# Patient Record
Sex: MALE | Race: WHITE | NOT HISPANIC OR LATINO | Employment: OTHER | URBAN - METROPOLITAN AREA
[De-identification: names, ages, dates, MRNs, and addresses within clinical notes are randomized per-mention and may not be internally consistent; named-entity substitution may affect disease eponyms.]

---

## 2023-02-25 LAB — SARS-COV-2 RESULT: NOT DETECTED

## 2023-03-09 ENCOUNTER — DOCUMENTATION (OUTPATIENT)
Dept: PRIMARY CARE | Facility: CLINIC | Age: 88
End: 2023-03-09
Payer: MEDICARE

## 2023-03-09 ENCOUNTER — TELEPHONE (OUTPATIENT)
Dept: PRIMARY CARE | Facility: CLINIC | Age: 88
End: 2023-03-09
Payer: MEDICARE

## 2023-03-09 DIAGNOSIS — I50.22 CHRONIC SYSTOLIC HEART FAILURE (MULTI): ICD-10-CM

## 2023-03-09 DIAGNOSIS — I48.91 ATRIAL FIBRILLATION, UNSPECIFIED TYPE (MULTI): Primary | ICD-10-CM

## 2023-03-09 NOTE — TELEPHONE ENCOUNTER
"----- Message from Rose Lee PharmD sent at 3/9/2023  4:29 PM EST -----  Regarding: Post Discharge Clinical Pharmacy Program Referral  Vickie Barton,       Your patient, Hugo Weaver, has been identified in the hospital as someone who would benefit from participating in the [facility] program, sponsored by Dr. Lund. This program aims to reduce future hospitalizations associated with chronic conditions through medication optimization and monitoring post-discharge, as well as in between their regularly scheduled appointments with you.     If agreeable, please submit a referral using the \"Referral to Clinical Pharmacy\" order and select the \"Platinum\" option in the order details for his Diabetes and/or CHF and the patient will be scheduled for our first telehealth session within 30 days of discharge.     Rose Lee, PharmD, [facility]  PGY1 Pharmacy Resident        "

## 2023-03-10 ENCOUNTER — PATIENT OUTREACH (OUTPATIENT)
Dept: CARE COORDINATION | Facility: CLINIC | Age: 88
End: 2023-03-10
Payer: MEDICARE

## 2023-03-10 DIAGNOSIS — R00.1 SYMPTOMATIC BRADYCARDIA: ICD-10-CM

## 2023-03-10 NOTE — PROGRESS NOTES
Discharge Facility:  [place]   Discharge Diagnosis:    symptomatic bradycardia   Admission Date:   3/6/23   Discharge Date:   3/8/23     PCP appt:   3/16/23       Engagement  Call Start Time: 1359 (3/10/2023  2:03 PM)    Medications  Medications reviewed with patient/caregiver?: Yes (3/10/2023  2:03 PM)  Is the patient having any side effects they believe may be caused by any medication additions or changes?: No (3/10/2023  2:03 PM)  Does the patient have all medications ordered at discharge?: Yes (3/10/2023  2:03 PM)  Prescription Comments: hold metoprolol (3/10/2023  2:03 PM)  Is the patient taking all medications as directed (includes completed medication regime)?: Yes (3/10/2023  2:03 PM)  Care Management Interventions: Nurse provided patient education (3/10/2023  2:03 PM)    Appointments  Does the patient have a primary care provider?: Yes (3/10/2023  2:03 PM)  Care Management Interventions: Verified appointment date/time/provider (3/10/2023  2:03 PM)  Has the patient kept scheduled appointments due by today?: Yes (3/10/2023  2:03 PM)    Self Management  Has home health visited the patient within 72 hours of discharge?: Not applicable (3/10/2023  2:03 PM)  DME Interventions: -- (pt has cane and rollator) (3/10/2023  2:03 PM)    Patient Teaching  Does the patient have access to their discharge instructions?: Yes (3/10/2023  2:03 PM)  Care Management Interventions: Reviewed instructions with patient (3/10/2023  2:03 PM)  What is the patient's perception of their health status since discharge?: Improving (3/10/2023  2:03 PM)  Is the patient/caregiver able to teach back the hierarchy of who to call/visit for symptoms/problems? PCP, Specialist, Home Health nurse, Urgent Care, ED, 911: Yes (3/10/2023  2:03 PM)    Wrap Up  Call End Time: 1404 (3/10/2023  2:03 PM)

## 2023-03-12 PROBLEM — R07.9 CHEST PAIN: Status: ACTIVE | Noted: 2023-03-12

## 2023-03-12 PROBLEM — J06.9 UPPER RESPIRATORY TRACT INFECTION: Status: ACTIVE | Noted: 2023-03-12

## 2023-03-12 PROBLEM — N40.0 BENIGN PROSTATE HYPERPLASIA: Status: ACTIVE | Noted: 2023-03-12

## 2023-03-12 PROBLEM — I49.5 SICK SINUS SYNDROME (MULTI): Status: ACTIVE | Noted: 2023-03-12

## 2023-03-12 PROBLEM — S46.819A TRAPEZIUS MUSCLE STRAIN: Status: ACTIVE | Noted: 2023-03-12

## 2023-03-12 PROBLEM — R10.31 RIGHT LOWER QUADRANT ABDOMINAL PAIN: Status: RESOLVED | Noted: 2023-03-12 | Resolved: 2023-03-12

## 2023-03-12 PROBLEM — R60.9 EDEMA: Status: ACTIVE | Noted: 2023-03-12

## 2023-03-12 PROBLEM — M54.2 CERVICALGIA: Status: ACTIVE | Noted: 2023-03-12

## 2023-03-12 PROBLEM — R53.1 WEAKNESS: Status: RESOLVED | Noted: 2023-03-12 | Resolved: 2023-03-12

## 2023-03-12 PROBLEM — F41.9 ANXIETY: Status: ACTIVE | Noted: 2023-03-12

## 2023-03-12 PROBLEM — I44.7 LEFT BUNDLE-BRANCH BLOCK: Status: ACTIVE | Noted: 2023-03-12

## 2023-03-12 PROBLEM — I50.9 CHF (CONGESTIVE HEART FAILURE) (MULTI): Status: ACTIVE | Noted: 2023-03-12

## 2023-03-12 PROBLEM — G62.9 PERIPHERAL NEUROPATHY: Status: ACTIVE | Noted: 2023-03-12

## 2023-03-12 PROBLEM — H34.9: Status: ACTIVE | Noted: 2023-03-12

## 2023-03-12 PROBLEM — E55.9 VITAMIN D DEFICIENCY: Status: ACTIVE | Noted: 2023-03-12

## 2023-03-12 PROBLEM — H90.3 BILATERAL SENSORINEURAL HEARING LOSS: Status: ACTIVE | Noted: 2023-03-12

## 2023-03-12 PROBLEM — I20.89 ATYPICAL ANGINA (CMS-HCC): Status: ACTIVE | Noted: 2023-03-12

## 2023-03-12 PROBLEM — J01.80 OTHER ACUTE SINUSITIS: Status: ACTIVE | Noted: 2023-03-12

## 2023-03-12 PROBLEM — B00.1 COLD SORE: Status: ACTIVE | Noted: 2023-03-12

## 2023-03-12 PROBLEM — L10.9 PEMPHIGUS (MULTI): Status: ACTIVE | Noted: 2023-03-12

## 2023-03-12 PROBLEM — J01.80 OTHER ACUTE SINUSITIS: Status: RESOLVED | Noted: 2023-03-12 | Resolved: 2023-03-12

## 2023-03-12 PROBLEM — K64.9 HEMORRHOID: Status: ACTIVE | Noted: 2023-03-12

## 2023-03-12 PROBLEM — Z20.822 SUSPECTED COVID-19 VIRUS INFECTION: Status: ACTIVE | Noted: 2023-03-12

## 2023-03-12 PROBLEM — R63.4 UNINTENTIONAL WEIGHT LOSS: Status: ACTIVE | Noted: 2023-03-12

## 2023-03-12 PROBLEM — R53.1 WEAKNESS: Status: ACTIVE | Noted: 2023-03-12

## 2023-03-12 PROBLEM — J32.0 CHRONIC SINUSITIS OF BOTH MAXILLARY SINUSES: Status: ACTIVE | Noted: 2023-03-12

## 2023-03-12 PROBLEM — R19.8 ALTERNATING CONSTIPATION AND DIARRHEA: Status: ACTIVE | Noted: 2023-03-12

## 2023-03-12 PROBLEM — R63.4 UNINTENTIONAL WEIGHT LOSS: Status: RESOLVED | Noted: 2023-03-12 | Resolved: 2023-03-12

## 2023-03-12 PROBLEM — N18.30 CHRONIC KIDNEY DISEASE, STAGE III (MODERATE) (MULTI): Status: ACTIVE | Noted: 2023-03-12

## 2023-03-12 PROBLEM — R53.83 FATIGUE: Status: ACTIVE | Noted: 2023-03-12

## 2023-03-12 PROBLEM — I48.91 ATRIAL FIBRILLATION (MULTI): Status: ACTIVE | Noted: 2023-03-12

## 2023-03-12 PROBLEM — I51.9 LEFT VENTRICULAR SYSTOLIC DYSFUNCTION WITHOUT HEART FAILURE: Status: ACTIVE | Noted: 2023-03-12

## 2023-03-12 PROBLEM — I25.10 ARTERIOSCLEROTIC CORONARY ARTERY DISEASE: Status: ACTIVE | Noted: 2023-03-12

## 2023-03-12 PROBLEM — E78.5 HLD (HYPERLIPIDEMIA): Status: ACTIVE | Noted: 2023-03-12

## 2023-03-12 PROBLEM — M85.80 OSTEOPENIA: Status: ACTIVE | Noted: 2023-03-12

## 2023-03-12 PROBLEM — H61.23 BILATERAL IMPACTED CERUMEN: Status: ACTIVE | Noted: 2023-03-12

## 2023-03-12 PROBLEM — E78.2 COMBINED HYPERLIPIDEMIA: Status: ACTIVE | Noted: 2023-03-12

## 2023-03-12 PROBLEM — E83.42 HYPOMAGNESEMIA: Status: ACTIVE | Noted: 2023-03-12

## 2023-03-12 PROBLEM — K52.9 CHRONIC DIARRHEA: Status: ACTIVE | Noted: 2023-03-12

## 2023-03-12 PROBLEM — I34.0 MITRAL INSUFFICIENCY: Status: ACTIVE | Noted: 2023-03-12

## 2023-03-12 PROBLEM — R94.31 ABNORMAL EKG: Status: ACTIVE | Noted: 2023-03-12

## 2023-03-12 PROBLEM — I10 HYPERTENSION: Status: ACTIVE | Noted: 2023-03-12

## 2023-03-12 PROBLEM — R10.31 RIGHT LOWER QUADRANT ABDOMINAL PAIN: Status: ACTIVE | Noted: 2023-03-12

## 2023-03-12 PROBLEM — E34.9 TESTOSTERONE DEFICIENCY: Status: ACTIVE | Noted: 2023-03-12

## 2023-03-12 PROBLEM — R07.89 RIGHT-SIDED CHEST WALL PAIN: Status: RESOLVED | Noted: 2023-03-12 | Resolved: 2023-03-12

## 2023-03-12 PROBLEM — R73.9 HYPERGLYCEMIA: Status: ACTIVE | Noted: 2023-03-12

## 2023-03-12 PROBLEM — R07.89 RIGHT-SIDED CHEST WALL PAIN: Status: ACTIVE | Noted: 2023-03-12

## 2023-03-12 PROBLEM — I82.451: Status: ACTIVE | Noted: 2023-03-12

## 2023-03-12 PROBLEM — J30.9 ALLERGIC RHINOSINUSITIS: Status: ACTIVE | Noted: 2023-03-12

## 2023-03-12 PROBLEM — J06.9 UPPER RESPIRATORY TRACT INFECTION: Status: RESOLVED | Noted: 2023-03-12 | Resolved: 2023-03-12

## 2023-03-12 PROBLEM — R53.83 FATIGUE: Status: RESOLVED | Noted: 2023-03-12 | Resolved: 2023-03-12

## 2023-03-12 RX ORDER — AZATHIOPRINE 50 MG/1
1 TABLET ORAL 2 TIMES DAILY
COMMUNITY
End: 2023-10-12 | Stop reason: ALTCHOICE

## 2023-03-12 RX ORDER — AZATHIOPRINE 50 MG/1
50 TABLET ORAL DAILY
COMMUNITY
End: 2023-03-12 | Stop reason: ENTERED-IN-ERROR

## 2023-03-12 RX ORDER — BUMETANIDE 2 MG/1
1 TABLET ORAL
Status: ON HOLD | COMMUNITY
End: 2023-11-02 | Stop reason: ENTERED-IN-ERROR

## 2023-03-12 RX ORDER — CHOLECALCIFEROL (VITAMIN D3) 50 MCG
1 TABLET ORAL DAILY
COMMUNITY
End: 2023-11-13

## 2023-03-12 RX ORDER — PRAVASTATIN SODIUM 20 MG/1
1 TABLET ORAL NIGHTLY
COMMUNITY
Start: 2017-02-16 | End: 2023-11-24 | Stop reason: SDUPTHER

## 2023-03-12 RX ORDER — LUTEIN 6 MG
1 TABLET ORAL DAILY
Status: ON HOLD | COMMUNITY
End: 2023-11-02 | Stop reason: ENTERED-IN-ERROR

## 2023-03-12 RX ORDER — SODIUM FLUORIDE 5 MG/G
PASTE, DENTIFRICE DENTAL DAILY
Status: ON HOLD | COMMUNITY
Start: 2022-05-17 | End: 2023-11-02 | Stop reason: ENTERED-IN-ERROR

## 2023-03-12 RX ORDER — FLUTICASONE PROPIONATE 50 MCG
2 SPRAY, SUSPENSION (ML) NASAL DAILY
COMMUNITY
End: 2023-10-12 | Stop reason: ALTCHOICE

## 2023-03-12 RX ORDER — IBUPROFEN 100 MG/5ML
1 SUSPENSION, ORAL (FINAL DOSE FORM) ORAL DAILY
COMMUNITY

## 2023-03-12 RX ORDER — METOPROLOL SUCCINATE 25 MG/1
1 TABLET, EXTENDED RELEASE ORAL DAILY
COMMUNITY
End: 2023-03-16

## 2023-03-12 RX ORDER — SPIRONOLACTONE 25 MG/1
1 TABLET ORAL DAILY
COMMUNITY
Start: 2015-06-04 | End: 2023-10-30 | Stop reason: SDUPTHER

## 2023-03-12 RX ORDER — LANOLIN ALCOHOL/MO/W.PET/CERES
1 CREAM (GRAM) TOPICAL DAILY
Status: ON HOLD | COMMUNITY
Start: 2021-09-09 | End: 2023-11-02 | Stop reason: ENTERED-IN-ERROR

## 2023-03-16 ENCOUNTER — OFFICE VISIT (OUTPATIENT)
Dept: PRIMARY CARE | Facility: CLINIC | Age: 88
End: 2023-03-16
Payer: MEDICARE

## 2023-03-16 VITALS
BODY MASS INDEX: 24.95 KG/M2 | DIASTOLIC BLOOD PRESSURE: 75 MMHG | WEIGHT: 184 LBS | SYSTOLIC BLOOD PRESSURE: 156 MMHG | HEART RATE: 68 BPM | OXYGEN SATURATION: 97 % | RESPIRATION RATE: 16 BRPM

## 2023-03-16 DIAGNOSIS — I48.91 ATRIAL FIBRILLATION, UNSPECIFIED TYPE (MULTI): ICD-10-CM

## 2023-03-16 DIAGNOSIS — E11.9 TYPE 2 DIABETES MELLITUS WITHOUT COMPLICATION, WITHOUT LONG-TERM CURRENT USE OF INSULIN (MULTI): ICD-10-CM

## 2023-03-16 DIAGNOSIS — I82.441 ACUTE EMBOLISM AND THROMBOSIS OF RIGHT TIBIAL VEIN (MULTI): ICD-10-CM

## 2023-03-16 DIAGNOSIS — I49.5 SICK SINUS SYNDROME (MULTI): ICD-10-CM

## 2023-03-16 DIAGNOSIS — I50.22 CHRONIC SYSTOLIC CONGESTIVE HEART FAILURE (MULTI): ICD-10-CM

## 2023-03-16 DIAGNOSIS — R00.1 SYMPTOMATIC BRADYCARDIA: Primary | ICD-10-CM

## 2023-03-16 DIAGNOSIS — N18.32 STAGE 3B CHRONIC KIDNEY DISEASE (MULTI): ICD-10-CM

## 2023-03-16 PROBLEM — R73.9 HYPERGLYCEMIA: Status: RESOLVED | Noted: 2023-03-12 | Resolved: 2023-03-16

## 2023-03-16 PROBLEM — R07.9 CHEST PAIN: Status: RESOLVED | Noted: 2023-03-12 | Resolved: 2023-03-16

## 2023-03-16 PROBLEM — R60.9 EDEMA: Status: RESOLVED | Noted: 2023-03-12 | Resolved: 2023-03-16

## 2023-03-16 PROBLEM — R94.31 ABNORMAL EKG: Status: RESOLVED | Noted: 2023-03-12 | Resolved: 2023-03-16

## 2023-03-16 PROBLEM — I20.89 ATYPICAL ANGINA (CMS-HCC): Status: RESOLVED | Noted: 2023-03-12 | Resolved: 2023-03-16

## 2023-03-16 PROBLEM — H61.23 BILATERAL IMPACTED CERUMEN: Status: RESOLVED | Noted: 2023-03-12 | Resolved: 2023-03-16

## 2023-03-16 PROBLEM — I82.451: Status: RESOLVED | Noted: 2023-03-12 | Resolved: 2023-03-16

## 2023-03-16 PROBLEM — I51.9 LEFT VENTRICULAR SYSTOLIC DYSFUNCTION WITHOUT HEART FAILURE: Status: RESOLVED | Noted: 2023-03-12 | Resolved: 2023-03-16

## 2023-03-16 PROBLEM — Z20.822 SUSPECTED COVID-19 VIRUS INFECTION: Status: RESOLVED | Noted: 2023-03-12 | Resolved: 2023-03-16

## 2023-03-16 PROCEDURE — 99214 OFFICE O/P EST MOD 30 MIN: CPT | Performed by: FAMILY MEDICINE

## 2023-03-16 PROCEDURE — 1036F TOBACCO NON-USER: CPT | Performed by: FAMILY MEDICINE

## 2023-03-16 PROCEDURE — G0009 ADMIN PNEUMOCOCCAL VACCINE: HCPCS | Performed by: FAMILY MEDICINE

## 2023-03-16 PROCEDURE — 90677 PCV20 VACCINE IM: CPT | Performed by: FAMILY MEDICINE

## 2023-03-16 PROCEDURE — 3078F DIAST BP <80 MM HG: CPT | Performed by: FAMILY MEDICINE

## 2023-03-16 PROCEDURE — 1159F MED LIST DOCD IN RCRD: CPT | Performed by: FAMILY MEDICINE

## 2023-03-16 PROCEDURE — 3077F SYST BP >= 140 MM HG: CPT | Performed by: FAMILY MEDICINE

## 2023-03-16 RX ORDER — ENALAPRIL MALEATE 10 MG/1
5 TABLET ORAL
COMMUNITY
End: 2023-10-12 | Stop reason: ALTCHOICE

## 2023-03-16 RX ORDER — TAMSULOSIN HYDROCHLORIDE 0.4 MG/1
0.4 CAPSULE ORAL
COMMUNITY
End: 2023-10-12 | Stop reason: ALTCHOICE

## 2023-03-16 RX ORDER — PREDNISONE 1 MG/1
1 TABLET ORAL DAILY
COMMUNITY
Start: 2023-02-24 | End: 2023-11-28 | Stop reason: SDUPTHER

## 2023-03-16 ASSESSMENT — ENCOUNTER SYMPTOMS
SHORTNESS OF BREATH: 0
FEVER: 0
COUGH: 0
DIZZINESS: 1

## 2023-03-16 NOTE — PROGRESS NOTES
Subjective   Patient ID: Hugo Weaver is a 89 y.o. male who presents for Hospital Follow-up (3/6/23-3/8/23 for bradycardia /).    HPI     He is here today for follow-up hospitalization.  He was hospitalized 3/6 through 3/8/2023 with symptomatic bradycardia    Past medical history is as follows:  He follows with cardiology for CAD status post CABG in 2003, history of left bundle branch block, atrial fibrillation, as well as chronic systolic heart failure.  He currently takes spironolactone, enalapril, bumetanide as needed, and Xarelto  He has a history of right lower extremity DVT that was diagnosed in late December in the ED on ultrasound.  He is currently taking Xarelto for this  He follows with a specialist for pemphigus and takes daily Imuran and prednisone 3 mg daily  Hyperlipidemia: Takes pravastatin 20 mg nightly  He has a history of diabetes mellitus which had previously been diagnosed when he was taking prednisone.  His last A1c done actually 2 months ago was 7.4%.    He had presented to the ED with symptoms including lightheadedness, nausea and abdominal pain.  This had started 1 day after starting metoprolol  CT scan of his head did not show any acute findings  CT abdomen/pelvis did not show any definitive acute process, but did show a radiodensity in the lumen of the aortic aneurysm and the mural thrombus which may possibly be endoleak or calcification.  He did see vascular surgery for this finding who had recommended no acute surgical intervention at this time.  He saw cardiology and metoprolol was stopped.  No other medication changes were made    Since discharge from hospital he is feeling well.  He has not had any further episodes of dizziness.  He will get an occasional cough due to postnasal drainage.  Abdominal pain and nausea resolved.  No significant chest pain or difficulty breathing        Review of Systems   Constitutional:  Negative for fever.   Respiratory:  Negative for cough and  shortness of breath.    Cardiovascular:  Positive for leg swelling. Negative for chest pain.   Neurological:  Positive for dizziness.       Objective   /75   Pulse 68   Resp 16   Wt 83.5 kg (184 lb)   SpO2 97%   BMI 24.95 kg/m²     Physical Exam  Vitals reviewed.   Constitutional:       General: He is not in acute distress.     Appearance: Normal appearance. He is well-developed.   HENT:      Head: Normocephalic.   Eyes:      Conjunctiva/sclera: Conjunctivae normal.   Cardiovascular:      Rate and Rhythm: Normal rate.      Heart sounds: Normal heart sounds.      Comments: Irregularly irregular  Pulmonary:      Effort: Pulmonary effort is normal.      Breath sounds: Normal breath sounds.   Musculoskeletal:      Right lower leg: Edema present.      Left lower leg: Edema present.      Comments: Trace bilateral leg edema   Skin:     Findings: No rash.   Neurological:      Mental Status: He is alert.   Psychiatric:         Mood and Affect: Mood normal.         Behavior: Behavior normal.         Assessment/Plan   Problem List Items Addressed This Visit          Medium    Atrial fibrillation (CMS/[facility])    CHF (congestive heart failure) (CMS/[facility])    Chronic kidney disease, stage III (moderate) (CMS/[facility])    Sick sinus syndrome (CMS/[facility])    Thrombosis of right peroneal vein (CMS/[facility])     Other Visit Diagnoses       Symptomatic bradycardia    -  Primary    Type 2 diabetes mellitus without complication, without long-term current use of insulin (CMS/[facility])        Relevant Orders    Hemoglobin A1C    Acute embolism and thrombosis of right tibial vein (CMS/[facility])            With recent hospitalization 3/6 through 3/8/2023 for symptomatic bradycardia secondary to metoprolol.  Metoprolol has since been stopped and his dizziness has resolved.  He is going to be following up with cardiology later this month for recheck.  Recommended that he contact us or them if any dizziness recurs.  Continue other medications including  enalapril, spironolactone, and Xarelto and bumetanide as needed  He was cleared for discharge by vascular surgery, and is going to be following up with them as well  Diabetes mellitus: His last A1c was 7.4%.  We will plan on checking this again in the next 2 to 3 months  Total time spent today was approximately 35 minutes.  This included time spent reviewing the chart prior to appointment, time spent with patient, time spent documenting once the appointment was complete

## 2023-03-27 ENCOUNTER — PATIENT OUTREACH (OUTPATIENT)
Dept: PRIMARY CARE | Facility: CLINIC | Age: 88
End: 2023-03-27
Payer: MEDICARE

## 2023-03-27 NOTE — PROGRESS NOTES
Call regarding apt with PCP on 3/16 after hospitalization.  At time of outreach call the patient feels as if their condition has(improved to baseline) since last visit.  Patient verbalizes understanding of new orders including labs, therapy, HHC, and DME.   Patient verbalizes understanding of medications including changes made during PCP  Patient verbalized understanding plan of care   Any further questions or concerns at this time for PCP? /No

## 2023-05-10 ENCOUNTER — LAB (OUTPATIENT)
Dept: LAB | Facility: LAB | Age: 88
End: 2023-05-10
Payer: MEDICARE

## 2023-05-10 DIAGNOSIS — E11.9 TYPE 2 DIABETES MELLITUS WITHOUT COMPLICATION, WITHOUT LONG-TERM CURRENT USE OF INSULIN (MULTI): ICD-10-CM

## 2023-05-10 LAB
ALANINE AMINOTRANSFERASE (SGPT) (U/L) IN SER/PLAS: 11 U/L (ref 10–52)
ALBUMIN (G/DL) IN SER/PLAS: 4.2 G/DL (ref 3.4–5)
ALKALINE PHOSPHATASE (U/L) IN SER/PLAS: 33 U/L (ref 33–136)
ANION GAP IN SER/PLAS: 13 MMOL/L (ref 10–20)
ASPARTATE AMINOTRANSFERASE (SGOT) (U/L) IN SER/PLAS: 17 U/L (ref 9–39)
BASOPHILS (10*3/UL) IN BLOOD BY AUTOMATED COUNT: 0.08 X10E9/L (ref 0–0.1)
BASOPHILS/100 LEUKOCYTES IN BLOOD BY AUTOMATED COUNT: 1.3 % (ref 0–2)
BILIRUBIN TOTAL (MG/DL) IN SER/PLAS: 0.9 MG/DL (ref 0–1.2)
CALCIUM (MG/DL) IN SER/PLAS: 9.5 MG/DL (ref 8.6–10.3)
CARBON DIOXIDE, TOTAL (MMOL/L) IN SER/PLAS: 25 MMOL/L (ref 21–32)
CHLORIDE (MMOL/L) IN SER/PLAS: 106 MMOL/L (ref 98–107)
CREATININE (MG/DL) IN SER/PLAS: 1.45 MG/DL (ref 0.5–1.3)
EOSINOPHILS (10*3/UL) IN BLOOD BY AUTOMATED COUNT: 0.38 X10E9/L (ref 0–0.4)
EOSINOPHILS/100 LEUKOCYTES IN BLOOD BY AUTOMATED COUNT: 6 % (ref 0–6)
ERYTHROCYTE DISTRIBUTION WIDTH (RATIO) BY AUTOMATED COUNT: 14.1 % (ref 11.5–14.5)
ERYTHROCYTE MEAN CORPUSCULAR HEMOGLOBIN CONCENTRATION (G/DL) BY AUTOMATED: 31.6 G/DL (ref 32–36)
ERYTHROCYTE MEAN CORPUSCULAR VOLUME (FL) BY AUTOMATED COUNT: 100 FL (ref 80–100)
ERYTHROCYTES (10*6/UL) IN BLOOD BY AUTOMATED COUNT: 4.2 X10E12/L (ref 4.5–5.9)
ESTIMATED AVERAGE GLUCOSE FOR HBA1C: 154 MG/DL
GFR MALE: 45 ML/MIN/1.73M2
GLUCOSE (MG/DL) IN SER/PLAS: 135 MG/DL (ref 74–99)
HEMATOCRIT (%) IN BLOOD BY AUTOMATED COUNT: 42.1 % (ref 41–52)
HEMOGLOBIN (G/DL) IN BLOOD: 13.3 G/DL (ref 13.5–17.5)
HEMOGLOBIN A1C/HEMOGLOBIN TOTAL IN BLOOD: 7 %
IMMATURE GRANULOCYTES/100 LEUKOCYTES IN BLOOD BY AUTOMATED COUNT: 3.2 % (ref 0–0.9)
LEUKOCYTES (10*3/UL) IN BLOOD BY AUTOMATED COUNT: 6.3 X10E9/L (ref 4.4–11.3)
LYMPHOCYTES (10*3/UL) IN BLOOD BY AUTOMATED COUNT: 1.2 X10E9/L (ref 0.8–3)
LYMPHOCYTES/100 LEUKOCYTES IN BLOOD BY AUTOMATED COUNT: 19 % (ref 13–44)
MONOCYTES (10*3/UL) IN BLOOD BY AUTOMATED COUNT: 0.56 X10E9/L (ref 0.05–0.8)
MONOCYTES/100 LEUKOCYTES IN BLOOD BY AUTOMATED COUNT: 8.9 % (ref 2–10)
NEUTROPHILS (10*3/UL) IN BLOOD BY AUTOMATED COUNT: 3.89 X10E9/L (ref 1.6–5.5)
NEUTROPHILS/100 LEUKOCYTES IN BLOOD BY AUTOMATED COUNT: 61.6 % (ref 40–80)
PLATELETS (10*3/UL) IN BLOOD AUTOMATED COUNT: 196 X10E9/L (ref 150–450)
POTASSIUM (MMOL/L) IN SER/PLAS: 4.3 MMOL/L (ref 3.5–5.3)
PROTEIN TOTAL: 6.6 G/DL (ref 6.4–8.2)
SODIUM (MMOL/L) IN SER/PLAS: 140 MMOL/L (ref 136–145)
UREA NITROGEN (MG/DL) IN SER/PLAS: 34 MG/DL (ref 6–23)

## 2023-05-10 PROCEDURE — 83036 HEMOGLOBIN GLYCOSYLATED A1C: CPT

## 2023-05-10 PROCEDURE — 36415 COLL VENOUS BLD VENIPUNCTURE: CPT

## 2023-07-11 LAB
ALANINE AMINOTRANSFERASE (SGPT) (U/L) IN SER/PLAS: 11 U/L (ref 10–52)
ALBUMIN (G/DL) IN SER/PLAS: 4.1 G/DL (ref 3.4–5)
ALKALINE PHOSPHATASE (U/L) IN SER/PLAS: 31 U/L (ref 33–136)
ANION GAP IN SER/PLAS: 14 MMOL/L (ref 10–20)
ASPARTATE AMINOTRANSFERASE (SGOT) (U/L) IN SER/PLAS: 18 U/L (ref 9–39)
BASOPHILS (10*3/UL) IN BLOOD BY AUTOMATED COUNT: 0.07 X10E9/L (ref 0–0.1)
BASOPHILS/100 LEUKOCYTES IN BLOOD BY AUTOMATED COUNT: 1 % (ref 0–2)
BILIRUBIN TOTAL (MG/DL) IN SER/PLAS: 0.6 MG/DL (ref 0–1.2)
CALCIUM (MG/DL) IN SER/PLAS: 9.6 MG/DL (ref 8.6–10.3)
CARBON DIOXIDE, TOTAL (MMOL/L) IN SER/PLAS: 23 MMOL/L (ref 21–32)
CHLORIDE (MMOL/L) IN SER/PLAS: 104 MMOL/L (ref 98–107)
CREATININE (MG/DL) IN SER/PLAS: 1.41 MG/DL (ref 0.5–1.3)
EOSINOPHILS (10*3/UL) IN BLOOD BY AUTOMATED COUNT: 0.23 X10E9/L (ref 0–0.4)
EOSINOPHILS/100 LEUKOCYTES IN BLOOD BY AUTOMATED COUNT: 3.1 % (ref 0–6)
ERYTHROCYTE DISTRIBUTION WIDTH (RATIO) BY AUTOMATED COUNT: 14.1 % (ref 11.5–14.5)
ERYTHROCYTE MEAN CORPUSCULAR HEMOGLOBIN CONCENTRATION (G/DL) BY AUTOMATED: 32.4 G/DL (ref 32–36)
ERYTHROCYTE MEAN CORPUSCULAR VOLUME (FL) BY AUTOMATED COUNT: 97 FL (ref 80–100)
ERYTHROCYTES (10*6/UL) IN BLOOD BY AUTOMATED COUNT: 4.24 X10E12/L (ref 4.5–5.9)
GFR MALE: 47 ML/MIN/1.73M2
GLUCOSE (MG/DL) IN SER/PLAS: 134 MG/DL (ref 74–99)
HEMATOCRIT (%) IN BLOOD BY AUTOMATED COUNT: 41.1 % (ref 41–52)
HEMOGLOBIN (G/DL) IN BLOOD: 13.3 G/DL (ref 13.5–17.5)
IMMATURE GRANULOCYTES/100 LEUKOCYTES IN BLOOD BY AUTOMATED COUNT: 1.9 % (ref 0–0.9)
LEUKOCYTES (10*3/UL) IN BLOOD BY AUTOMATED COUNT: 7.3 X10E9/L (ref 4.4–11.3)
LYMPHOCYTES (10*3/UL) IN BLOOD BY AUTOMATED COUNT: 0.64 X10E9/L (ref 0.8–3)
LYMPHOCYTES/100 LEUKOCYTES IN BLOOD BY AUTOMATED COUNT: 8.7 % (ref 13–44)
MONOCYTES (10*3/UL) IN BLOOD BY AUTOMATED COUNT: 0.64 X10E9/L (ref 0.05–0.8)
MONOCYTES/100 LEUKOCYTES IN BLOOD BY AUTOMATED COUNT: 8.7 % (ref 2–10)
NEUTROPHILS (10*3/UL) IN BLOOD BY AUTOMATED COUNT: 5.61 X10E9/L (ref 1.6–5.5)
NEUTROPHILS/100 LEUKOCYTES IN BLOOD BY AUTOMATED COUNT: 76.6 % (ref 40–80)
PLATELETS (10*3/UL) IN BLOOD AUTOMATED COUNT: 189 X10E9/L (ref 150–450)
POTASSIUM (MMOL/L) IN SER/PLAS: 4.6 MMOL/L (ref 3.5–5.3)
PROTEIN TOTAL: 6.5 G/DL (ref 6.4–8.2)
SODIUM (MMOL/L) IN SER/PLAS: 136 MMOL/L (ref 136–145)
UREA NITROGEN (MG/DL) IN SER/PLAS: 33 MG/DL (ref 6–23)

## 2023-09-07 PROBLEM — D69.2 OTHER NONTHROMBOCYTOPENIC PURPURA (CMS-HCC): Status: ACTIVE | Noted: 2023-07-18

## 2023-09-07 PROBLEM — G62.9 NEUROPATHY: Status: ACTIVE | Noted: 2023-09-07

## 2023-09-07 PROBLEM — L72.8 OTHER FOLLICULAR CYSTS OF THE SKIN AND SUBCUTANEOUS TISSUE: Status: ACTIVE | Noted: 2023-07-18

## 2023-09-07 PROBLEM — F40.240 CLAUSTROPHOBIA: Status: ACTIVE | Noted: 2023-09-07

## 2023-09-07 PROBLEM — R21 RASH AND OTHER NONSPECIFIC SKIN ERUPTION: Status: ACTIVE | Noted: 2023-07-18

## 2023-09-07 PROBLEM — R79.89 ABNORMAL TSH: Status: ACTIVE | Noted: 2023-09-07

## 2023-09-07 PROBLEM — H25.813 COMBINED FORM OF SENILE CATARACT OF BOTH EYES: Status: ACTIVE | Noted: 2017-06-29

## 2023-09-07 PROBLEM — L57.0 ACTINIC KERATOSIS: Status: ACTIVE | Noted: 2023-07-18

## 2023-09-07 PROBLEM — D23.10 PAPILLOMA OF EYELID: Status: ACTIVE | Noted: 2017-06-29

## 2023-09-07 PROBLEM — K58.9 IBS (IRRITABLE BOWEL SYNDROME): Status: ACTIVE | Noted: 2023-09-07

## 2023-09-07 PROBLEM — J31.0 RHINITIS: Status: ACTIVE | Noted: 2023-09-07

## 2023-09-07 PROBLEM — L13.9 BULLOUS ERUPTION: Status: ACTIVE | Noted: 2023-07-18

## 2023-09-07 PROBLEM — H02.403 PTOSIS OF BOTH EYELIDS: Status: ACTIVE | Noted: 2017-06-29

## 2023-09-07 PROBLEM — H25.10 NUCLEAR SENILE CATARACT: Status: ACTIVE | Noted: 2017-07-07

## 2023-09-07 PROBLEM — L28.0 LICHEN SIMPLEX CHRONICUS: Status: ACTIVE | Noted: 2023-07-18

## 2023-09-07 PROBLEM — I50.22 CHRONIC SYSTOLIC HEART FAILURE (MULTI): Status: ACTIVE | Noted: 2023-09-07

## 2023-09-07 PROBLEM — L03.116 CELLULITIS OF LEFT LOWER LIMB: Status: ACTIVE | Noted: 2023-07-18

## 2023-09-07 RX ORDER — BETAMETHASONE DIPROPIONATE 0.5 MG/G
1 OINTMENT, AUGMENTED TOPICAL
Status: ON HOLD | COMMUNITY
Start: 2015-10-13 | End: 2023-11-02 | Stop reason: ENTERED-IN-ERROR

## 2023-09-07 RX ORDER — CARVEDILOL 12.5 MG/1
12.5 TABLET ORAL
COMMUNITY
End: 2023-10-12 | Stop reason: ALTCHOICE

## 2023-09-07 RX ORDER — ALPRAZOLAM 0.25 MG/1
TABLET ORAL EVERY 6 HOURS PRN
COMMUNITY
Start: 2016-05-01 | End: 2023-10-12 | Stop reason: ALTCHOICE

## 2023-09-07 RX ORDER — CARVEDILOL 3.12 MG/1
TABLET ORAL
COMMUNITY
Start: 2016-05-01 | End: 2023-10-12 | Stop reason: ALTCHOICE

## 2023-09-07 RX ORDER — RIVAROXABAN 15 MG-20MG
KIT ORAL
COMMUNITY
Start: 2022-12-27 | End: 2023-10-12 | Stop reason: ALTCHOICE

## 2023-09-07 RX ORDER — CLOBETASOL PROPIONATE 0.5 MG/G
1 OINTMENT TOPICAL
COMMUNITY
Start: 2015-09-29 | End: 2023-10-12 | Stop reason: ALTCHOICE

## 2023-09-07 RX ORDER — TRAMADOL HYDROCHLORIDE AND ACETAMINOPHEN 37.5; 325 MG/1; MG/1
TABLET, FILM COATED ORAL EVERY 6 HOURS PRN
COMMUNITY
Start: 2016-05-01 | End: 2023-10-12 | Stop reason: ALTCHOICE

## 2023-09-07 RX ORDER — VERAPAMIL HYDROCHLORIDE 240 MG/1
1 CAPSULE, EXTENDED RELEASE ORAL DAILY
COMMUNITY
Start: 2002-02-20 | End: 2023-10-12 | Stop reason: ALTCHOICE

## 2023-09-07 RX ORDER — CALCIUM CARBONATE/VITAMIN D3 500 MG-10
TABLET,CHEWABLE ORAL 2 TIMES DAILY
Status: ON HOLD | COMMUNITY
End: 2023-11-02 | Stop reason: ENTERED-IN-ERROR

## 2023-09-07 RX ORDER — HYDROCODONE BITARTRATE AND ACETAMINOPHEN 5; 325 MG/1; MG/1
1 TABLET ORAL EVERY 6 HOURS PRN
COMMUNITY
Start: 2020-11-04 | End: 2023-10-12 | Stop reason: ALTCHOICE

## 2023-09-07 RX ORDER — EZETIMIBE 10 MG/1
TABLET ORAL DAILY
COMMUNITY
End: 2023-10-12 | Stop reason: ALTCHOICE

## 2023-09-07 RX ORDER — PREDNISONE 2.5 MG/1
TABLET ORAL
COMMUNITY
Start: 2019-07-23 | End: 2023-10-12 | Stop reason: ALTCHOICE

## 2023-09-07 RX ORDER — WARFARIN SODIUM 5 MG/1
5 TABLET ORAL
COMMUNITY
End: 2023-10-12 | Stop reason: ALTCHOICE

## 2023-09-07 RX ORDER — VITAMIN E 268 MG
CAPSULE ORAL DAILY
Status: ON HOLD | COMMUNITY
End: 2023-11-02 | Stop reason: ENTERED-IN-ERROR

## 2023-09-07 RX ORDER — LIDOCAINE 50 MG/G
1 PATCH TOPICAL EVERY 12 HOURS
COMMUNITY
Start: 2023-02-24 | End: 2023-10-12 | Stop reason: ALTCHOICE

## 2023-09-07 RX ORDER — POTASSIUM CHLORIDE 20 MEQ/1
20 TABLET, EXTENDED RELEASE ORAL DAILY PRN
Status: ON HOLD | COMMUNITY
End: 2023-11-02 | Stop reason: ENTERED-IN-ERROR

## 2023-09-07 RX ORDER — CALCIUM CARBONATE 500(1250)
1000 TABLET ORAL DAILY
Status: ON HOLD | COMMUNITY
End: 2023-11-02 | Stop reason: ENTERED-IN-ERROR

## 2023-09-07 RX ORDER — OMEGA-3-ACID ETHYL ESTERS 1 G/1
1 CAPSULE, LIQUID FILLED ORAL DAILY
COMMUNITY
End: 2023-10-12 | Stop reason: ALTCHOICE

## 2023-09-07 RX ORDER — ENALAPRIL MALEATE 10 MG/1
5 TABLET ORAL NIGHTLY
COMMUNITY
End: 2023-11-03 | Stop reason: HOSPADM

## 2023-09-07 RX ORDER — LANOLIN ALCOHOL/MO/W.PET/CERES
400 CREAM (GRAM) TOPICAL DAILY
COMMUNITY
End: 2024-01-22 | Stop reason: ENTERED-IN-ERROR

## 2023-09-07 RX ORDER — CLOBETASOL PROPIONATE 0.5 MG/G
1 CREAM TOPICAL
Status: ON HOLD | COMMUNITY
Start: 2019-04-24 | End: 2023-11-02 | Stop reason: ENTERED-IN-ERROR

## 2023-09-07 RX ORDER — ROSUVASTATIN CALCIUM 5 MG/1
TABLET, COATED ORAL
COMMUNITY
End: 2023-10-12 | Stop reason: ALTCHOICE

## 2023-09-07 RX ORDER — BUMETANIDE 1 MG/1
1 TABLET ORAL EVERY OTHER DAY
COMMUNITY
End: 2023-10-12 | Stop reason: ALTCHOICE

## 2023-09-07 RX ORDER — AZATHIOPRINE 50 MG/1
50 TABLET ORAL EVERY MORNING
COMMUNITY
End: 2023-11-28 | Stop reason: SDUPTHER

## 2023-09-07 RX ORDER — PREDNISONE 5 MG/1
5 TABLET ORAL
COMMUNITY
Start: 2017-08-29 | End: 2023-10-12 | Stop reason: ALTCHOICE

## 2023-09-08 PROBLEM — D69.2 OTHER NONTHROMBOCYTOPENIC PURPURA (CMS-HCC): Status: RESOLVED | Noted: 2023-07-18 | Resolved: 2023-09-08

## 2023-10-12 ENCOUNTER — OFFICE VISIT (OUTPATIENT)
Dept: CARDIOLOGY | Facility: CLINIC | Age: 88
End: 2023-10-12
Payer: MEDICARE

## 2023-10-12 VITALS
HEIGHT: 72 IN | BODY MASS INDEX: 23.84 KG/M2 | OXYGEN SATURATION: 97 % | DIASTOLIC BLOOD PRESSURE: 80 MMHG | HEART RATE: 58 BPM | WEIGHT: 176 LBS | SYSTOLIC BLOOD PRESSURE: 138 MMHG

## 2023-10-12 DIAGNOSIS — I50.22 CHRONIC SYSTOLIC HEART FAILURE (MULTI): ICD-10-CM

## 2023-10-12 DIAGNOSIS — I44.7 LEFT BUNDLE-BRANCH BLOCK: ICD-10-CM

## 2023-10-12 DIAGNOSIS — I25.10 ARTERIOSCLEROTIC CORONARY ARTERY DISEASE: Primary | ICD-10-CM

## 2023-10-12 DIAGNOSIS — E78.5 HYPERLIPIDEMIA, UNSPECIFIED HYPERLIPIDEMIA TYPE: ICD-10-CM

## 2023-10-12 DIAGNOSIS — I48.91 ATRIAL FIBRILLATION, UNSPECIFIED TYPE (MULTI): ICD-10-CM

## 2023-10-12 PROCEDURE — 1159F MED LIST DOCD IN RCRD: CPT | Performed by: INTERNAL MEDICINE

## 2023-10-12 PROCEDURE — 3079F DIAST BP 80-89 MM HG: CPT | Performed by: INTERNAL MEDICINE

## 2023-10-12 PROCEDURE — 3075F SYST BP GE 130 - 139MM HG: CPT | Performed by: INTERNAL MEDICINE

## 2023-10-12 PROCEDURE — 1160F RVW MEDS BY RX/DR IN RCRD: CPT | Performed by: INTERNAL MEDICINE

## 2023-10-12 PROCEDURE — 99214 OFFICE O/P EST MOD 30 MIN: CPT | Performed by: INTERNAL MEDICINE

## 2023-10-12 PROCEDURE — 1126F AMNT PAIN NOTED NONE PRSNT: CPT | Performed by: INTERNAL MEDICINE

## 2023-10-12 PROCEDURE — 1036F TOBACCO NON-USER: CPT | Performed by: INTERNAL MEDICINE

## 2023-10-12 ASSESSMENT — ENCOUNTER SYMPTOMS
OCCASIONAL FEELINGS OF UNSTEADINESS: 0
LOSS OF SENSATION IN FEET: 0
DEPRESSION: 0

## 2023-10-12 ASSESSMENT — PAIN SCALES - GENERAL: PAINLEVEL: 0-NO PAIN

## 2023-10-12 NOTE — PROGRESS NOTES
Chief Complaint:   No chief complaint on file.     History Of Present Illness:    Hugo Weaver is a 89 y.o. male with a history of LBBB, CAD s/p CABG (2003 LIMA to LAD, VG to OM, and VG to right PDA), peripheral neuropathy, atrial fibrillation, chronic systolic heart failure, abdominal aortic aneurysm s/p repair, diabetes and chronic kidney disease here for follow-up.     Denies any chest pain, shortness of breath, palpitations, lightheadedness or leg swelling.  Sleeps on 1 pillow.     Echocardiogram 3/1/2023: EF 40 to 45%. Abnormal septal motion consistent with LBBB. Mildly reduced right ventricular systolic function. Severe left atrial enlargement and mild right atrial enlargement. Moderate AR.     Lexiscan nuclear stress test 12/6/2021: No evidence of ischemia. Possible scar in the distal anterior and distal septal wall extending to the apex. EF 53%.     24-hour Holter February 2021: Atrial fibrillation 100% of the time. Average heart rate 57 bpm ( bpm). Pause of 3 seconds at 4 AM on day 2.     Echocardiogram 6/12/2015: EF 30 to 35%. Mild to moderate left atrial enlargement and right atrial enlargement. Mild AR and TR.     Catheterization 12/31/2007: 95 to 99% stenosis of the mid circumflex and 80% stenosis of the right PDA. Vein graft to PDA occluded. Vein graft to OM patent. LIMA was atretic and nonfunctional with note of anterograde flow through the native LAD system.      Past Medical History:  He has a past medical history of Dyspnea, unspecified, Other specified postprocedural states, Personal history of other diseases of the circulatory system, Personal history of other diseases of the circulatory system, Personal history of other diseases of the circulatory system, Personal history of other diseases of the digestive system (09/27/2018), Personal history of other endocrine, nutritional and metabolic disease, Personal history of other endocrine, nutritional and metabolic disease, Personal history  of other endocrine, nutritional and metabolic disease, Personal history of other medical treatment, and Personal history of other specified conditions (12/30/2015).    Past Surgical History:  He has a past surgical history that includes Other surgical history (11/17/2014); Other surgical history (11/05/2018); Other surgical history (12/03/2019); Other surgical history (07/25/2019); and CT angio abdomen pelvis w and or wo IV IV contrast (3/18/2020).      Social History:  He reports that he has never smoked. He has never used smokeless tobacco. He reports that he does not currently use alcohol. He reports that he does not use drugs.    Family History:  Family History   Problem Relation Name Age of Onset    Diabetes Mother      Other (Cardiac disorder) Mother      No Known Problems Father          Allergies:  Atorvastatin and Simvastatin    Outpatient Medications:  Current Outpatient Medications   Medication Instructions    alpha tocopherol (Vitamin E) 268 mg (400 unit) capsule oral, Daily    ascorbic acid (Vitamin C) 1,000 mg tablet 1 tablet, oral, Daily    azaTHIOprine (IMURAN) 50 mg, oral, Daily    betamethasone, augmented, (Diprolene, augmented,) 0.05 % ointment 1 Application, Topical    bumetanide (Bumex) 2 mg tablet 1 tablet, oral, Weekly, And as needed for weight gain over 2 lbs    calcium carbonate (OSCAL) 1,000 mg, oral, Daily    calcium carbonate-vitamin D3 500 mg-10 mcg (400 unit) chewable tablet oral, 2 times daily    cholecalciferol (Vitamin D-3) 50 MCG (2000 UT) tablet 1 tablet, oral, Daily    clobetasol (Temovate) 0.05 % cream 1 Application, Topical    docosahexaenoic acid/epa (FISH OIL ORAL) oral, Daily, capsule    enalapril (VASOTEC) 20 mg, oral, Daily    fluoride, sodium, (Prevident 5000 Plus) 1.1 % dental cream dental, Daily, Brush as directed    magnesium oxide (Mag-Ox) 400 mg (241.3 mg magnesium) tablet 1 tablet, oral, Daily    magnesium oxide (MAG-OX) 800 mg, oral, Daily     multivitamin,therapeutic (THERAPEUTIC MULTIVITAMIN ORAL) 1 tablet, oral, Daily    omega-3 fatty acids 500 mg capsule oral, Daily    potassium chloride CR 20 mEq ER tablet 20 mEq, oral, Daily PRN    pravastatin (Pravachol) 20 mg tablet 1 tablet, oral, Nightly    predniSONE (Deltasone) 1 mg tablet 4 tablets, oral, Daily    rivaroxaban (Xarelto) 15 mg tablet 1 tablet, oral, Daily, Take with food.    spironolactone (Aldactone) 25 mg tablet 1 tablet, oral, Daily    vitamin E acid succinate (vitamin E succinate) 268 mg (400 unit) tablet 1 tablet, oral, Daily       Last Recorded Vitals:  Visit Vitals  /80 (BP Location: Left arm, Patient Position: Sitting)   Pulse 58   Ht 1.829 m (6')   Wt 79.8 kg (176 lb)   SpO2 97%   BMI 23.87 kg/m²   Smoking Status Never   BSA 2.01 m²        Physical Exam:   In general: alert and in no acute distress.   HEENT: Mucous membranes moist, carotid upstrokes normal with no bruits. JVP is normal. No cervical lymphadenopathy. Cranial nerves II-XII grossly intact.  Pulmonary: Clear to auscultation bilaterally.  Cardiovascular: S1,S2, regular. No appreciable murmurs, rubs or gallops.   Lower extremities: Warm. 2+ distal pulses. Trivial edema.  Skin: warm and dry. No obvious rashes visualized.  Psychiatric: Oriented to person, place and time. No obvious agitation.     Last Labs:  CBC -  Lab Results   Component Value Date    WBC 7.3 07/11/2023    HGB 13.3 (L) 07/11/2023    HCT 41.1 07/11/2023    MCV 97 07/11/2023     07/11/2023       CMP -  Lab Results   Component Value Date    CALCIUM 9.6 07/11/2023    PHOS 3.2 03/08/2023    PROT 6.5 07/11/2023    ALBUMIN 4.1 07/11/2023    AST 18 07/11/2023    ALT 11 07/11/2023    ALKPHOS 31 (L) 07/11/2023    BILITOT 0.6 07/11/2023       LIPID PANEL -   Lab Results   Component Value Date    CHOL 167 01/04/2023    TRIG 176 (H) 01/04/2023    HDL 50.1 01/04/2023    CHHDL 3.3 01/04/2023    LDLF 82 01/04/2023    VLDL 35 01/04/2023       RENAL FUNCTION PANEL  -   Lab Results   Component Value Date    GLUCOSE 134 (H) 07/11/2023     07/11/2023    K 4.6 07/11/2023     07/11/2023    CO2 23 07/11/2023    ANIONGAP 14 07/11/2023    BUN 33 (H) 07/11/2023    CREATININE 1.41 (H) 07/11/2023    GFRMALE 47 (A) 07/11/2023    CALCIUM 9.6 07/11/2023    PHOS 3.2 03/08/2023    ALBUMIN 4.1 07/11/2023        Lab Results   Component Value Date     (H) 03/06/2023    HGBA1C 7.0 (A) 05/10/2023           Assessment/Plan    1) CAD: No symptoms of exertional chest pain or shortness of breath. Based on the ISCHEMIA Trial no need for routine stress testing.     2) atrial fibrillation: Continue Xarelto 15 mg daily.     3) left bundle branch block: Chronic     4) chronic systolic heart failure: Patient with diminished EF intolerant to beta-blockers due to symptomatic bradycardia. Not interested in changing/adding heart failure medications at this time.     5) dyslipidemia: LDL 82 most recently in January.  Continue with pravastatin     6) follow-up: 7 months.  We may transition to annual follow-ups but I would like to be off schedule from his primary care physician.      Jef Jara MD

## 2023-10-27 ENCOUNTER — HOSPITAL ENCOUNTER (EMERGENCY)
Facility: HOSPITAL | Age: 88
Discharge: HOME | End: 2023-10-27
Attending: EMERGENCY MEDICINE
Payer: MEDICARE

## 2023-10-27 ENCOUNTER — APPOINTMENT (OUTPATIENT)
Dept: RADIOLOGY | Facility: HOSPITAL | Age: 88
End: 2023-10-27
Payer: MEDICARE

## 2023-10-27 ENCOUNTER — APPOINTMENT (OUTPATIENT)
Dept: CARDIOLOGY | Facility: HOSPITAL | Age: 88
End: 2023-10-27
Payer: MEDICARE

## 2023-10-27 VITALS
WEIGHT: 175 LBS | SYSTOLIC BLOOD PRESSURE: 139 MMHG | OXYGEN SATURATION: 98 % | BODY MASS INDEX: 23.19 KG/M2 | TEMPERATURE: 97 F | HEIGHT: 73 IN | HEART RATE: 79 BPM | RESPIRATION RATE: 18 BRPM | DIASTOLIC BLOOD PRESSURE: 68 MMHG

## 2023-10-27 DIAGNOSIS — R60.1 GENERALIZED EDEMA: ICD-10-CM

## 2023-10-27 DIAGNOSIS — L03.116 CELLULITIS OF LEFT LOWER LIMB: ICD-10-CM

## 2023-10-27 DIAGNOSIS — L03.116 CELLULITIS OF LEFT LOWER EXTREMITY: Primary | ICD-10-CM

## 2023-10-27 LAB
ALBUMIN SERPL BCP-MCNC: 4.1 G/DL (ref 3.4–5)
ALP SERPL-CCNC: 34 U/L (ref 33–136)
ALT SERPL W P-5'-P-CCNC: 9 U/L (ref 10–52)
ANION GAP SERPL CALC-SCNC: 14 MMOL/L (ref 10–20)
AST SERPL W P-5'-P-CCNC: 16 U/L (ref 9–39)
BASOPHILS # BLD AUTO: 0.04 X10*3/UL (ref 0–0.1)
BASOPHILS NFR BLD AUTO: 0.5 %
BILIRUB SERPL-MCNC: 0.9 MG/DL (ref 0–1.2)
BNP SERPL-MCNC: 134 PG/ML (ref 0–99)
BUN SERPL-MCNC: 34 MG/DL (ref 6–23)
CALCIUM SERPL-MCNC: 9.6 MG/DL (ref 8.6–10.3)
CARDIAC TROPONIN I PNL SERPL HS: 15 NG/L (ref 0–20)
CARDIAC TROPONIN I PNL SERPL HS: 16 NG/L (ref 0–20)
CHLORIDE SERPL-SCNC: 99 MMOL/L (ref 98–107)
CO2 SERPL-SCNC: 26 MMOL/L (ref 21–32)
CREAT SERPL-MCNC: 1.5 MG/DL (ref 0.5–1.3)
EOSINOPHIL # BLD AUTO: 0.22 X10*3/UL (ref 0–0.4)
EOSINOPHIL NFR BLD AUTO: 3 %
ERYTHROCYTE [DISTWIDTH] IN BLOOD BY AUTOMATED COUNT: 14 % (ref 11.5–14.5)
GFR SERPL CREATININE-BSD FRML MDRD: 43 ML/MIN/1.73M*2
GLUCOSE SERPL-MCNC: 134 MG/DL (ref 74–99)
HCT VFR BLD AUTO: 39.5 % (ref 41–52)
HGB BLD-MCNC: 13.1 G/DL (ref 13.5–17.5)
HOLD SPECIMEN: NORMAL
IMM GRANULOCYTES # BLD AUTO: 0.09 X10*3/UL (ref 0–0.5)
IMM GRANULOCYTES NFR BLD AUTO: 1.2 % (ref 0–0.9)
LYMPHOCYTES # BLD AUTO: 0.69 X10*3/UL (ref 0.8–3)
LYMPHOCYTES NFR BLD AUTO: 9.3 %
MAGNESIUM SERPL-MCNC: 1.87 MG/DL (ref 1.6–2.4)
MCH RBC QN AUTO: 31.3 PG (ref 26–34)
MCHC RBC AUTO-ENTMCNC: 33.2 G/DL (ref 32–36)
MCV RBC AUTO: 94 FL (ref 80–100)
MONOCYTES # BLD AUTO: 0.61 X10*3/UL (ref 0.05–0.8)
MONOCYTES NFR BLD AUTO: 8.2 %
NEUTROPHILS # BLD AUTO: 5.77 X10*3/UL (ref 1.6–5.5)
NEUTROPHILS NFR BLD AUTO: 77.8 %
NRBC BLD-RTO: 0 /100 WBCS (ref 0–0)
PLATELET # BLD AUTO: 188 X10*3/UL (ref 150–450)
PMV BLD AUTO: 10.9 FL (ref 7.5–11.5)
POTASSIUM SERPL-SCNC: 4.7 MMOL/L (ref 3.5–5.3)
PROT SERPL-MCNC: 6.1 G/DL (ref 6.4–8.2)
RBC # BLD AUTO: 4.19 X10*6/UL (ref 4.5–5.9)
SODIUM SERPL-SCNC: 134 MMOL/L (ref 136–145)
WBC # BLD AUTO: 7.4 X10*3/UL (ref 4.4–11.3)

## 2023-10-27 PROCEDURE — 2500000004 HC RX 250 GENERAL PHARMACY W/ HCPCS (ALT 636 FOR OP/ED)

## 2023-10-27 PROCEDURE — 85025 COMPLETE CBC W/AUTO DIFF WBC: CPT

## 2023-10-27 PROCEDURE — 84484 ASSAY OF TROPONIN QUANT: CPT | Mod: 59

## 2023-10-27 PROCEDURE — 80053 COMPREHEN METABOLIC PANEL: CPT

## 2023-10-27 PROCEDURE — 36415 COLL VENOUS BLD VENIPUNCTURE: CPT

## 2023-10-27 PROCEDURE — 83880 ASSAY OF NATRIURETIC PEPTIDE: CPT

## 2023-10-27 PROCEDURE — 84484 ASSAY OF TROPONIN QUANT: CPT

## 2023-10-27 PROCEDURE — 93971 EXTREMITY STUDY: CPT

## 2023-10-27 PROCEDURE — 83735 ASSAY OF MAGNESIUM: CPT

## 2023-10-27 PROCEDURE — 93971 EXTREMITY STUDY: CPT | Performed by: SURGERY

## 2023-10-27 PROCEDURE — 99285 EMERGENCY DEPT VISIT HI MDM: CPT | Mod: 25

## 2023-10-27 PROCEDURE — 99284 EMERGENCY DEPT VISIT MOD MDM: CPT | Performed by: EMERGENCY MEDICINE

## 2023-10-27 PROCEDURE — 99285 EMERGENCY DEPT VISIT HI MDM: CPT | Performed by: EMERGENCY MEDICINE

## 2023-10-27 PROCEDURE — 96365 THER/PROPH/DIAG IV INF INIT: CPT

## 2023-10-27 RX ORDER — CEPHALEXIN 500 MG/1
500 CAPSULE ORAL 2 TIMES DAILY
Qty: 14 CAPSULE | Refills: 0 | Status: SHIPPED | OUTPATIENT
Start: 2023-10-27 | End: 2023-11-03

## 2023-10-27 RX ORDER — CEFTRIAXONE 1 G/50ML
1 INJECTION, SOLUTION INTRAVENOUS ONCE
Status: COMPLETED | OUTPATIENT
Start: 2023-10-27 | End: 2023-10-27

## 2023-10-27 RX ADMIN — CEFTRIAXONE SODIUM 1 G: 1 INJECTION, SOLUTION INTRAVENOUS at 15:08

## 2023-10-27 ASSESSMENT — COLUMBIA-SUICIDE SEVERITY RATING SCALE - C-SSRS
6. HAVE YOU EVER DONE ANYTHING, STARTED TO DO ANYTHING, OR PREPARED TO DO ANYTHING TO END YOUR LIFE?: NO
1. IN THE PAST MONTH, HAVE YOU WISHED YOU WERE DEAD OR WISHED YOU COULD GO TO SLEEP AND NOT WAKE UP?: NO
2. HAVE YOU ACTUALLY HAD ANY THOUGHTS OF KILLING YOURSELF?: NO

## 2023-10-27 ASSESSMENT — LIFESTYLE VARIABLES
HAVE YOU EVER FELT YOU SHOULD CUT DOWN ON YOUR DRINKING: NO
EVER HAD A DRINK FIRST THING IN THE MORNING TO STEADY YOUR NERVES TO GET RID OF A HANGOVER: NO
REASON UNABLE TO ASSESS: NO
HAVE PEOPLE ANNOYED YOU BY CRITICIZING YOUR DRINKING: NO
EVER FELT BAD OR GUILTY ABOUT YOUR DRINKING: NO

## 2023-10-27 NOTE — ED PROVIDER NOTES
HPI   Chief Complaint   Patient presents with    Leg Pain       89-year-old male with history of A-fib on Xarelto, RLE DVT, and bradycardia presenting to [facility] ED with complaint of left lower extremity rash and swelling.  Reports that he has noticed increased swelling of his left lower extremity for the past 4 days in which he has been taking his Lasix without improvement.  States that he presented to an urgent care this morning who recommended he come to the ED for DVT ultrasound.  Also notes feeling weak in the waiting room in which the nurse reports his heart rate was in the 30s.  Patient reports seeing Dr. Jara for this in which they are debating pacemaker placement.  Denies chest pain, shortness of breath, lightheadedness, or presyncope.      History provided by:  Patient                      No data recorded                Patient History   Past Medical History:   Diagnosis Date    Dyspnea, unspecified     Dyspnea    Other specified postprocedural states     History of endoscopy    Personal history of other diseases of the circulatory system     History of aortic valve insufficiency    Personal history of other diseases of the circulatory system     History of mitral valve insufficiency    Personal history of other diseases of the circulatory system     Personal history of coronary atherosclerosis    Personal history of other diseases of the digestive system 09/27/2018    History of biliary colic    Personal history of other endocrine, nutritional and metabolic disease     History of hyperlipidemia    Personal history of other endocrine, nutritional and metabolic disease     History of obesity    Personal history of other endocrine, nutritional and metabolic disease     History of type 1 diabetes mellitus    Personal history of other medical treatment     History of echocardiogram    Personal history of other specified conditions 12/30/2015    History of edema     Past Surgical History:   Procedure  Laterality Date    CT ABDOMEN PELVIS ANGIOGRAM W AND/OR WO IV CONTRAST  3/18/2020    CT ABDOMEN PELVIS ANGIOGRAM W AND/OR WO IV CONTRAST 3/18/2020 [facility]    OTHER SURGICAL HISTORY  11/17/2014    Cardiac Catheter His Ablation    OTHER SURGICAL HISTORY  11/05/2018    Cholecystectomy laparoscopic    OTHER SURGICAL HISTORY  12/03/2019    Coronary artery bypass graft    OTHER SURGICAL HISTORY  07/25/2019    Gallbladder surgery     Family History   Problem Relation Name Age of Onset    Diabetes Mother      Other (Cardiac disorder) Mother      No Known Problems Father       Social History     Tobacco Use    Smoking status: Never    Smokeless tobacco: Never   Substance Use Topics    Alcohol use: Not Currently    Drug use: Never       Physical Exam   ED Triage Vitals [10/27/23 1321]   Temp Heart Rate Resp BP   36.1 °C (97 °F) (!) 38 16 --      SpO2 Temp Source Heart Rate Source Patient Position   97 % Temporal Monitor Lying      BP Location FiO2 (%)     Right arm --       Physical Exam  Vitals and nursing note reviewed.   Constitutional:       General: He is not in acute distress.     Appearance: He is well-developed. He is not ill-appearing or toxic-appearing.   HENT:      Head: Normocephalic and atraumatic.      Right Ear: External ear normal.      Left Ear: External ear normal.      Nose: Nose normal. No congestion or rhinorrhea.      Mouth/Throat:      Mouth: Mucous membranes are moist.      Pharynx: No oropharyngeal exudate or posterior oropharyngeal erythema.   Eyes:      General:         Right eye: No discharge.         Left eye: No discharge.      Extraocular Movements: Extraocular movements intact.      Conjunctiva/sclera: Conjunctivae normal.   Cardiovascular:      Rate and Rhythm: Normal rate. Rhythm irregular.      Pulses: Normal pulses.      Heart sounds:      No friction rub.   Pulmonary:      Effort: Pulmonary effort is normal. No respiratory distress.      Breath sounds: Normal breath sounds. No  stridor. No wheezing or rales.   Abdominal:      General: There is no distension.      Palpations: Abdomen is soft.      Tenderness: There is no abdominal tenderness. There is no right CVA tenderness, left CVA tenderness or guarding.   Musculoskeletal:         General: No swelling, tenderness, deformity or signs of injury. Normal range of motion.      Cervical back: Neck supple.      Right lower leg: No edema.      Left lower leg: No edema.   Skin:     General: Skin is warm and dry.      Capillary Refill: Capillary refill takes less than 2 seconds.      Coloration: Skin is not jaundiced or pale.      Findings: Erythema and rash (cellulitis) present. No lesion. Rash is scaling.          Neurological:      General: No focal deficit present.      Mental Status: He is alert. Mental status is at baseline.      Cranial Nerves: No cranial nerve deficit.      Sensory: No sensory deficit.      Motor: No weakness.   Psychiatric:         Mood and Affect: Mood normal.         Behavior: Behavior normal.         Thought Content: Thought content normal.         Judgment: Judgment normal.         ED Course & MDM   ED Course as of 10/27/23 2342   Fri Oct 27, 2023   1423 Creatinine(!): 1.50  Baseline 1.48 [ES]   1423 Labs returned unimpressive for systemic inflammation or infection, acute anemia or blood loss, ENOCH, hepatitis, or electrolyte abnormalities. [ES]   1423 Labs returned unimpressive for systemic inflammation or infection, acute anemia or blood loss, ENOCH, hepatitis, acute heart failure, cardiac ischemia, or electrolyte abnormalities. [ES]   1424 Vascular US lower extremity venous duplex left  Cannot rule out thrombus in non-visualized posterior tibial and peroneal veins due to edema and shadowing artifact from artery calcification. [ES]      ED Course User Index  [ES] Geovanni Moore MD         Diagnoses as of 10/27/23 2342   Cellulitis of left lower extremity       Medical Decision Making  89-year-old male with history  mentioned above presenting to the ED with complaint of left lower extremity rash and swelling.    Patient is bradycardic at 38 (60's in the room) but otherwise afebrile, hemodynamically stable on room air, and in no acute distress.  Physical exam findings mentioned above.  CBC, CMP, EKG, troponins, magnesium, BNP, and duplex ultrasound of the left lower extremity ordered.    Labs and imaging interpretations included in ED course.  Rocephin provided for cellulitic coverage.  Patient's heart rate improved and maintained.  Ambulatory pulse ox obtained and patient remained with a normal heart rate and asymptomatic.    Disposition: Discussed with patient who agreed with discharge. He will utilize Keflex for cellulitis and continue his Xarelto for A-fib.  He will follow-up with his PCP in 1 day as well as Dr. Jara next week.  Strict return precautions provided.        Procedure  Procedures        Geovanni Moore MD  Resident  10/27/23 8328

## 2023-10-27 NOTE — DISCHARGE INSTRUCTIONS
Utilize Tylenol and Motrin as needed for pain.    Seek immediate medical attention should you have:  Increased redness of your leg, increased pain, numbness, fevers, shortness of breath, palpitations, weakness, confusion, vomiting, or any worsening or concerning symptoms.

## 2023-10-30 ENCOUNTER — TELEPHONE (OUTPATIENT)
Dept: PRIMARY CARE | Facility: CLINIC | Age: 88
End: 2023-10-30
Payer: MEDICARE

## 2023-10-30 DIAGNOSIS — I10 BENIGN ESSENTIAL HYPERTENSION: Primary | ICD-10-CM

## 2023-10-30 RX ORDER — SPIRONOLACTONE 25 MG/1
25 TABLET ORAL DAILY
Qty: 90 TABLET | Refills: 3 | Status: SHIPPED | OUTPATIENT
Start: 2023-10-30 | End: 2023-11-24 | Stop reason: SDUPTHER

## 2023-10-30 NOTE — TELEPHONE ENCOUNTER
Pt called; was discharged for [facility] on 10/27/23; cellulitis. He needs a hospital fuv. Pt is has a scheduled appt on 11/13/23 for his Medicare Px, he stated he can see you then or earlier if you think that's necessary. Please advise.

## 2023-11-01 ENCOUNTER — HOSPITAL ENCOUNTER (INPATIENT)
Facility: HOSPITAL | Age: 88
LOS: 1 days | Discharge: HOME | DRG: 313 | End: 2023-11-03
Attending: STUDENT IN AN ORGANIZED HEALTH CARE EDUCATION/TRAINING PROGRAM | Admitting: STUDENT IN AN ORGANIZED HEALTH CARE EDUCATION/TRAINING PROGRAM
Payer: MEDICARE

## 2023-11-01 ENCOUNTER — HOSPITAL ENCOUNTER (OUTPATIENT)
Dept: CARDIOLOGY | Facility: HOSPITAL | Age: 88
Discharge: HOME | End: 2023-11-01
Payer: MEDICARE

## 2023-11-01 DIAGNOSIS — R94.31 ABNORMAL EKG: ICD-10-CM

## 2023-11-01 DIAGNOSIS — M79.605 PAIN IN LEFT LEG: ICD-10-CM

## 2023-11-01 DIAGNOSIS — I82.403 DEEP VEIN THROMBOSIS (DVT) OF BOTH LOWER EXTREMITIES, UNSPECIFIED CHRONICITY, UNSPECIFIED VEIN (MULTI): ICD-10-CM

## 2023-11-01 DIAGNOSIS — R07.89 OTHER CHEST PAIN: ICD-10-CM

## 2023-11-01 DIAGNOSIS — R07.9 CHEST PAIN, UNSPECIFIED TYPE: ICD-10-CM

## 2023-11-01 DIAGNOSIS — L03.90 CELLULITIS: Primary | ICD-10-CM

## 2023-11-01 DIAGNOSIS — M79.89 OTHER SPECIFIED SOFT TISSUE DISORDERS: ICD-10-CM

## 2023-11-01 DIAGNOSIS — I10 HYPERTENSION, UNSPECIFIED TYPE: ICD-10-CM

## 2023-11-01 DIAGNOSIS — L03.116 CELLULITIS OF LEFT LOWER LIMB: ICD-10-CM

## 2023-11-01 DIAGNOSIS — R00.1 BRADYCARDIA: ICD-10-CM

## 2023-11-01 LAB
ALBUMIN SERPL BCP-MCNC: 4.6 G/DL (ref 3.4–5)
ALP SERPL-CCNC: 36 U/L (ref 33–136)
ALT SERPL W P-5'-P-CCNC: 11 U/L (ref 10–52)
ANION GAP SERPL CALC-SCNC: 16 MMOL/L (ref 10–20)
AST SERPL W P-5'-P-CCNC: 16 U/L (ref 9–39)
ATRIAL RATE: 39 BPM
ATRIAL RATE: 61 BPM
BASOPHILS # BLD AUTO: 0.08 X10*3/UL (ref 0–0.1)
BASOPHILS NFR BLD AUTO: 1.1 %
BILIRUB SERPL-MCNC: 0.6 MG/DL (ref 0–1.2)
BNP SERPL-MCNC: 468 PG/ML (ref 0–99)
BUN SERPL-MCNC: 36 MG/DL (ref 6–23)
CALCIUM SERPL-MCNC: 9.7 MG/DL (ref 8.6–10.3)
CARDIAC TROPONIN I PNL SERPL HS: 17 NG/L (ref 0–20)
CARDIAC TROPONIN I PNL SERPL HS: 22 NG/L (ref 0–20)
CHLORIDE SERPL-SCNC: 102 MMOL/L (ref 98–107)
CO2 SERPL-SCNC: 22 MMOL/L (ref 21–32)
CREAT SERPL-MCNC: 1.43 MG/DL (ref 0.5–1.3)
EOSINOPHIL # BLD AUTO: 0.37 X10*3/UL (ref 0–0.4)
EOSINOPHIL NFR BLD AUTO: 5.3 %
ERYTHROCYTE [DISTWIDTH] IN BLOOD BY AUTOMATED COUNT: 13.8 % (ref 11.5–14.5)
GFR SERPL CREATININE-BSD FRML MDRD: 46 ML/MIN/1.73M*2
GLUCOSE SERPL-MCNC: 138 MG/DL (ref 74–99)
HCT VFR BLD AUTO: 43.9 % (ref 41–52)
HGB BLD-MCNC: 14.1 G/DL (ref 13.5–17.5)
IMM GRANULOCYTES # BLD AUTO: 0.08 X10*3/UL (ref 0–0.5)
IMM GRANULOCYTES NFR BLD AUTO: 1.1 % (ref 0–0.9)
LYMPHOCYTES # BLD AUTO: 1.04 X10*3/UL (ref 0.8–3)
LYMPHOCYTES NFR BLD AUTO: 14.9 %
MAGNESIUM SERPL-MCNC: 1.87 MG/DL (ref 1.6–2.4)
MCH RBC QN AUTO: 31.1 PG (ref 26–34)
MCHC RBC AUTO-ENTMCNC: 32.1 G/DL (ref 32–36)
MCV RBC AUTO: 97 FL (ref 80–100)
MONOCYTES # BLD AUTO: 0.68 X10*3/UL (ref 0.05–0.8)
MONOCYTES NFR BLD AUTO: 9.7 %
NEUTROPHILS # BLD AUTO: 4.73 X10*3/UL (ref 1.6–5.5)
NEUTROPHILS NFR BLD AUTO: 67.9 %
NRBC BLD-RTO: 0 /100 WBCS (ref 0–0)
PLATELET # BLD AUTO: 205 X10*3/UL (ref 150–450)
POTASSIUM SERPL-SCNC: 4.6 MMOL/L (ref 3.5–5.3)
PROT SERPL-MCNC: 7.3 G/DL (ref 6.4–8.2)
Q ONSET: 209 MS
Q ONSET: 210 MS
QRS COUNT: 10 BEATS
QRS COUNT: 10 BEATS
QRS DURATION: 156 MS
QRS DURATION: 162 MS
QT INTERVAL: 438 MS
QT INTERVAL: 456 MS
QTC CALCULATION(BAZETT): 448 MS
QTC CALCULATION(BAZETT): 459 MS
QTC FREDERICIA: 445 MS
QTC FREDERICIA: 458 MS
R AXIS: -63 DEGREES
R AXIS: -64 DEGREES
RBC # BLD AUTO: 4.54 X10*6/UL (ref 4.5–5.9)
SODIUM SERPL-SCNC: 135 MMOL/L (ref 136–145)
T AXIS: 103 DEGREES
T AXIS: 94 DEGREES
T OFFSET: 428 MS
T OFFSET: 438 MS
VENTRICULAR RATE: 61 BPM
VENTRICULAR RATE: 63 BPM
WBC # BLD AUTO: 7 X10*3/UL (ref 4.4–11.3)

## 2023-11-01 PROCEDURE — 36415 COLL VENOUS BLD VENIPUNCTURE: CPT | Performed by: EMERGENCY MEDICINE

## 2023-11-01 PROCEDURE — 99285 EMERGENCY DEPT VISIT HI MDM: CPT | Performed by: STUDENT IN AN ORGANIZED HEALTH CARE EDUCATION/TRAINING PROGRAM

## 2023-11-01 PROCEDURE — 85025 COMPLETE CBC W/AUTO DIFF WBC: CPT | Performed by: STUDENT IN AN ORGANIZED HEALTH CARE EDUCATION/TRAINING PROGRAM

## 2023-11-01 PROCEDURE — 93005 ELECTROCARDIOGRAM TRACING: CPT

## 2023-11-01 PROCEDURE — 83735 ASSAY OF MAGNESIUM: CPT | Performed by: EMERGENCY MEDICINE

## 2023-11-01 PROCEDURE — 83880 ASSAY OF NATRIURETIC PEPTIDE: CPT | Performed by: STUDENT IN AN ORGANIZED HEALTH CARE EDUCATION/TRAINING PROGRAM

## 2023-11-01 PROCEDURE — 99285 EMERGENCY DEPT VISIT HI MDM: CPT | Performed by: EMERGENCY MEDICINE

## 2023-11-01 PROCEDURE — 85025 COMPLETE CBC W/AUTO DIFF WBC: CPT | Performed by: EMERGENCY MEDICINE

## 2023-11-01 PROCEDURE — 36415 COLL VENOUS BLD VENIPUNCTURE: CPT | Performed by: STUDENT IN AN ORGANIZED HEALTH CARE EDUCATION/TRAINING PROGRAM

## 2023-11-01 PROCEDURE — 83880 ASSAY OF NATRIURETIC PEPTIDE: CPT | Performed by: EMERGENCY MEDICINE

## 2023-11-01 PROCEDURE — 80053 COMPREHEN METABOLIC PANEL: CPT | Performed by: EMERGENCY MEDICINE

## 2023-11-01 PROCEDURE — 93010 ELECTROCARDIOGRAM REPORT: CPT | Performed by: INTERNAL MEDICINE

## 2023-11-01 PROCEDURE — 84484 ASSAY OF TROPONIN QUANT: CPT | Mod: 59 | Performed by: STUDENT IN AN ORGANIZED HEALTH CARE EDUCATION/TRAINING PROGRAM

## 2023-11-01 PROCEDURE — 84484 ASSAY OF TROPONIN QUANT: CPT | Performed by: STUDENT IN AN ORGANIZED HEALTH CARE EDUCATION/TRAINING PROGRAM

## 2023-11-01 PROCEDURE — 83735 ASSAY OF MAGNESIUM: CPT | Performed by: STUDENT IN AN ORGANIZED HEALTH CARE EDUCATION/TRAINING PROGRAM

## 2023-11-01 PROCEDURE — 93010 ELECTROCARDIOGRAM REPORT: CPT | Performed by: EMERGENCY MEDICINE

## 2023-11-01 PROCEDURE — 84484 ASSAY OF TROPONIN QUANT: CPT | Performed by: EMERGENCY MEDICINE

## 2023-11-01 PROCEDURE — 80053 COMPREHEN METABOLIC PANEL: CPT | Performed by: STUDENT IN AN ORGANIZED HEALTH CARE EDUCATION/TRAINING PROGRAM

## 2023-11-01 ASSESSMENT — LIFESTYLE VARIABLES
REASON UNABLE TO ASSESS: NO
HAVE YOU EVER FELT YOU SHOULD CUT DOWN ON YOUR DRINKING: NO
EVER HAD A DRINK FIRST THING IN THE MORNING TO STEADY YOUR NERVES TO GET RID OF A HANGOVER: NO
HAVE PEOPLE ANNOYED YOU BY CRITICIZING YOUR DRINKING: NO
EVER FELT BAD OR GUILTY ABOUT YOUR DRINKING: NO

## 2023-11-01 ASSESSMENT — PAIN DESCRIPTION - ONSET: ONSET: SUDDEN

## 2023-11-01 ASSESSMENT — PAIN DESCRIPTION - FREQUENCY: FREQUENCY: INTERMITTENT

## 2023-11-01 ASSESSMENT — PAIN DESCRIPTION - DESCRIPTORS
DESCRIPTORS: PRESSURE

## 2023-11-01 ASSESSMENT — PAIN DESCRIPTION - ORIENTATION: ORIENTATION: MID

## 2023-11-01 ASSESSMENT — PAIN SCALES - GENERAL
PAINLEVEL_OUTOF10: 4
PAINLEVEL_OUTOF10: 4

## 2023-11-01 ASSESSMENT — PAIN DESCRIPTION - LOCATION: LOCATION: CHEST

## 2023-11-01 ASSESSMENT — PAIN DESCRIPTION - PAIN TYPE: TYPE: ACUTE PAIN

## 2023-11-01 ASSESSMENT — PAIN - FUNCTIONAL ASSESSMENT: PAIN_FUNCTIONAL_ASSESSMENT: 0-10

## 2023-11-02 ENCOUNTER — APPOINTMENT (OUTPATIENT)
Dept: CARDIOLOGY | Facility: HOSPITAL | Age: 88
DRG: 313 | End: 2023-11-02
Payer: MEDICARE

## 2023-11-02 ENCOUNTER — APPOINTMENT (OUTPATIENT)
Dept: RADIOLOGY | Facility: HOSPITAL | Age: 88
DRG: 313 | End: 2023-11-02
Payer: MEDICARE

## 2023-11-02 ENCOUNTER — APPOINTMENT (OUTPATIENT)
Dept: CARDIOLOGY | Facility: CLINIC | Age: 88
End: 2023-11-02
Payer: MEDICARE

## 2023-11-02 PROBLEM — L03.90 CELLULITIS: Status: ACTIVE | Noted: 2023-11-02

## 2023-11-02 PROBLEM — R07.9 CHEST PAIN: Status: ACTIVE | Noted: 2023-11-02

## 2023-11-02 LAB
ALBUMIN SERPL BCP-MCNC: 3.6 G/DL (ref 3.4–5)
ALP SERPL-CCNC: 26 U/L (ref 33–136)
ALT SERPL W P-5'-P-CCNC: 9 U/L (ref 10–52)
ANION GAP SERPL CALC-SCNC: 12 MMOL/L (ref 10–20)
AST SERPL W P-5'-P-CCNC: 13 U/L (ref 9–39)
BILIRUB SERPL-MCNC: 0.6 MG/DL (ref 0–1.2)
BUN SERPL-MCNC: 30 MG/DL (ref 6–23)
CALCIUM SERPL-MCNC: 8.8 MG/DL (ref 8.6–10.3)
CHLORIDE SERPL-SCNC: 107 MMOL/L (ref 98–107)
CO2 SERPL-SCNC: 22 MMOL/L (ref 21–32)
CREAT SERPL-MCNC: 1.27 MG/DL (ref 0.5–1.3)
ERYTHROCYTE [DISTWIDTH] IN BLOOD BY AUTOMATED COUNT: 14 % (ref 11.5–14.5)
GFR SERPL CREATININE-BSD FRML MDRD: 53 ML/MIN/1.73M*2
GLUCOSE SERPL-MCNC: 125 MG/DL (ref 74–99)
HCT VFR BLD AUTO: 37.1 % (ref 41–52)
HGB BLD-MCNC: 12.2 G/DL (ref 13.5–17.5)
MAGNESIUM SERPL-MCNC: 1.68 MG/DL (ref 1.6–2.4)
MCH RBC QN AUTO: 31.9 PG (ref 26–34)
MCHC RBC AUTO-ENTMCNC: 32.9 G/DL (ref 32–36)
MCV RBC AUTO: 97 FL (ref 80–100)
NRBC BLD-RTO: 0 /100 WBCS (ref 0–0)
PLATELET # BLD AUTO: 181 X10*3/UL (ref 150–450)
POTASSIUM SERPL-SCNC: 4.2 MMOL/L (ref 3.5–5.3)
PROT SERPL-MCNC: 5.6 G/DL (ref 6.4–8.2)
RBC # BLD AUTO: 3.83 X10*6/UL (ref 4.5–5.9)
SODIUM SERPL-SCNC: 137 MMOL/L (ref 136–145)
WBC # BLD AUTO: 6.3 X10*3/UL (ref 4.4–11.3)

## 2023-11-02 PROCEDURE — 2500000004 HC RX 250 GENERAL PHARMACY W/ HCPCS (ALT 636 FOR OP/ED)

## 2023-11-02 PROCEDURE — 71045 X-RAY EXAM CHEST 1 VIEW: CPT | Performed by: STUDENT IN AN ORGANIZED HEALTH CARE EDUCATION/TRAINING PROGRAM

## 2023-11-02 PROCEDURE — 83735 ASSAY OF MAGNESIUM: CPT | Performed by: NURSE PRACTITIONER

## 2023-11-02 PROCEDURE — 99223 1ST HOSP IP/OBS HIGH 75: CPT | Performed by: INTERNAL MEDICINE

## 2023-11-02 PROCEDURE — 36415 COLL VENOUS BLD VENIPUNCTURE: CPT | Performed by: INTERNAL MEDICINE

## 2023-11-02 PROCEDURE — 71045 X-RAY EXAM CHEST 1 VIEW: CPT | Mod: FY

## 2023-11-02 PROCEDURE — 85027 COMPLETE CBC AUTOMATED: CPT | Performed by: INTERNAL MEDICINE

## 2023-11-02 PROCEDURE — 1100000001 HC PRIVATE ROOM DAILY

## 2023-11-02 PROCEDURE — 2500000004 HC RX 250 GENERAL PHARMACY W/ HCPCS (ALT 636 FOR OP/ED): Performed by: INTERNAL MEDICINE

## 2023-11-02 PROCEDURE — 99222 1ST HOSP IP/OBS MODERATE 55: CPT

## 2023-11-02 PROCEDURE — 2500000001 HC RX 250 WO HCPCS SELF ADMINISTERED DRUGS (ALT 637 FOR MEDICARE OP)

## 2023-11-02 PROCEDURE — 80053 COMPREHEN METABOLIC PANEL: CPT | Performed by: INTERNAL MEDICINE

## 2023-11-02 PROCEDURE — 93970 EXTREMITY STUDY: CPT

## 2023-11-02 PROCEDURE — 2500000004 HC RX 250 GENERAL PHARMACY W/ HCPCS (ALT 636 FOR OP/ED): Performed by: STUDENT IN AN ORGANIZED HEALTH CARE EDUCATION/TRAINING PROGRAM

## 2023-11-02 PROCEDURE — 93306 TTE W/DOPPLER COMPLETE: CPT

## 2023-11-02 PROCEDURE — 93970 EXTREMITY STUDY: CPT | Performed by: SURGERY

## 2023-11-02 RX ORDER — TAMSULOSIN HYDROCHLORIDE 0.4 MG/1
0.4 CAPSULE ORAL NIGHTLY
COMMUNITY

## 2023-11-02 RX ORDER — METOCLOPRAMIDE HYDROCHLORIDE 5 MG/ML
5 INJECTION INTRAMUSCULAR; INTRAVENOUS EVERY 6 HOURS PRN
Status: DISCONTINUED | OUTPATIENT
Start: 2023-11-02 | End: 2023-11-03 | Stop reason: HOSPADM

## 2023-11-02 RX ORDER — ONDANSETRON HYDROCHLORIDE 2 MG/ML
4 INJECTION, SOLUTION INTRAVENOUS EVERY 8 HOURS PRN
Status: DISCONTINUED | OUTPATIENT
Start: 2023-11-02 | End: 2023-11-03 | Stop reason: HOSPADM

## 2023-11-02 RX ORDER — TALC
3 POWDER (GRAM) TOPICAL NIGHTLY PRN
Status: DISCONTINUED | OUTPATIENT
Start: 2023-11-02 | End: 2023-11-03 | Stop reason: HOSPADM

## 2023-11-02 RX ORDER — VITAMIN E MIXED 400 UNIT
400 CAPSULE ORAL DAILY
COMMUNITY

## 2023-11-02 RX ORDER — POLYETHYLENE GLYCOL 3350 17 G/17G
17 POWDER, FOR SOLUTION ORAL DAILY PRN
Status: DISCONTINUED | OUTPATIENT
Start: 2023-11-02 | End: 2023-11-03 | Stop reason: HOSPADM

## 2023-11-02 RX ORDER — ACETAMINOPHEN 650 MG/1
650 SUPPOSITORY RECTAL EVERY 4 HOURS PRN
Status: DISCONTINUED | OUTPATIENT
Start: 2023-11-02 | End: 2023-11-03 | Stop reason: HOSPADM

## 2023-11-02 RX ORDER — PANTOPRAZOLE SODIUM 40 MG/1
40 TABLET, DELAYED RELEASE ORAL
Status: DISCONTINUED | OUTPATIENT
Start: 2023-11-02 | End: 2023-11-03 | Stop reason: HOSPADM

## 2023-11-02 RX ORDER — LANOLIN ALCOHOL/MO/W.PET/CERES
400 CREAM (GRAM) TOPICAL DAILY
Status: DISCONTINUED | OUTPATIENT
Start: 2023-11-02 | End: 2023-11-03 | Stop reason: HOSPADM

## 2023-11-02 RX ORDER — ONDANSETRON 4 MG/1
4 TABLET, ORALLY DISINTEGRATING ORAL EVERY 8 HOURS PRN
Status: DISCONTINUED | OUTPATIENT
Start: 2023-11-02 | End: 2023-11-03 | Stop reason: HOSPADM

## 2023-11-02 RX ORDER — ACETAMINOPHEN 325 MG/1
650 TABLET ORAL EVERY 4 HOURS PRN
Status: DISCONTINUED | OUTPATIENT
Start: 2023-11-02 | End: 2023-11-03 | Stop reason: HOSPADM

## 2023-11-02 RX ORDER — AZATHIOPRINE 50 MG/1
75 TABLET ORAL NIGHTLY
COMMUNITY
End: 2023-11-13

## 2023-11-02 RX ORDER — METOCLOPRAMIDE 10 MG/1
5 TABLET ORAL EVERY 6 HOURS PRN
Status: DISCONTINUED | OUTPATIENT
Start: 2023-11-02 | End: 2023-11-03 | Stop reason: HOSPADM

## 2023-11-02 RX ORDER — PRAVASTATIN SODIUM 20 MG/1
20 TABLET ORAL NIGHTLY
Status: DISCONTINUED | OUTPATIENT
Start: 2023-11-02 | End: 2023-11-03 | Stop reason: HOSPADM

## 2023-11-02 RX ORDER — ENALAPRIL MALEATE 5 MG/1
5 TABLET ORAL NIGHTLY
Status: DISCONTINUED | OUTPATIENT
Start: 2023-11-02 | End: 2023-11-03 | Stop reason: HOSPADM

## 2023-11-02 RX ORDER — ACETAMINOPHEN 160 MG/5ML
650 SOLUTION ORAL EVERY 4 HOURS PRN
Status: DISCONTINUED | OUTPATIENT
Start: 2023-11-02 | End: 2023-11-03 | Stop reason: HOSPADM

## 2023-11-02 RX ORDER — PANTOPRAZOLE SODIUM 40 MG/10ML
40 INJECTION, POWDER, LYOPHILIZED, FOR SOLUTION INTRAVENOUS
Status: DISCONTINUED | OUTPATIENT
Start: 2023-11-02 | End: 2023-11-03 | Stop reason: HOSPADM

## 2023-11-02 RX ORDER — SPIRONOLACTONE 25 MG/1
25 TABLET ORAL DAILY
Status: DISCONTINUED | OUTPATIENT
Start: 2023-11-02 | End: 2023-11-03 | Stop reason: HOSPADM

## 2023-11-02 RX ADMIN — PIPERACILLIN SODIUM AND TAZOBACTAM SODIUM 3.38 G: 3; .375 INJECTION, SOLUTION INTRAVENOUS at 06:11

## 2023-11-02 RX ADMIN — RIVAROXABAN 15 MG: 15 TABLET, FILM COATED ORAL at 17:17

## 2023-11-02 RX ADMIN — PANTOPRAZOLE SODIUM 40 MG: 40 TABLET, DELAYED RELEASE ORAL at 06:50

## 2023-11-02 RX ADMIN — ENALAPRIL MALEATE 5 MG: 5 TABLET ORAL at 21:20

## 2023-11-02 RX ADMIN — PRAVASTATIN SODIUM 20 MG: 20 TABLET ORAL at 21:20

## 2023-11-02 RX ADMIN — SPIRONOLACTONE 25 MG: 25 TABLET, COATED ORAL at 17:35

## 2023-11-02 RX ADMIN — Medication 3 G: at 21:20

## 2023-11-02 RX ADMIN — Medication 400 MG: at 17:35

## 2023-11-02 RX ADMIN — PIPERACILLIN SODIUM AND TAZOBACTAM SODIUM 3.38 G: 3; .375 INJECTION, SOLUTION INTRAVENOUS at 13:01

## 2023-11-02 SDOH — SOCIAL STABILITY: SOCIAL INSECURITY: ARE THERE ANY APPARENT SIGNS OF INJURIES/BEHAVIORS THAT COULD BE RELATED TO ABUSE/NEGLECT?: NO

## 2023-11-02 SDOH — SOCIAL STABILITY: SOCIAL INSECURITY: ABUSE: ADULT

## 2023-11-02 SDOH — SOCIAL STABILITY: SOCIAL INSECURITY: ARE YOU OR HAVE YOU BEEN THREATENED OR ABUSED PHYSICALLY, EMOTIONALLY, OR SEXUALLY BY ANYONE?: NO

## 2023-11-02 SDOH — SOCIAL STABILITY: SOCIAL INSECURITY: WERE YOU ABLE TO COMPLETE ALL THE BEHAVIORAL HEALTH SCREENINGS?: YES

## 2023-11-02 SDOH — SOCIAL STABILITY: SOCIAL INSECURITY: DO YOU FEEL UNSAFE GOING BACK TO THE PLACE WHERE YOU ARE LIVING?: NO

## 2023-11-02 SDOH — SOCIAL STABILITY: SOCIAL INSECURITY: HAVE YOU HAD THOUGHTS OF HARMING ANYONE ELSE?: NO

## 2023-11-02 SDOH — SOCIAL STABILITY: SOCIAL INSECURITY: DOES ANYONE TRY TO KEEP YOU FROM HAVING/CONTACTING OTHER FRIENDS OR DOING THINGS OUTSIDE YOUR HOME?: NO

## 2023-11-02 SDOH — SOCIAL STABILITY: SOCIAL INSECURITY: DO YOU FEEL ANYONE HAS EXPLOITED OR TAKEN ADVANTAGE OF YOU FINANCIALLY OR OF YOUR PERSONAL PROPERTY?: NO

## 2023-11-02 SDOH — SOCIAL STABILITY: SOCIAL INSECURITY: HAS ANYONE EVER THREATENED TO HURT YOUR FAMILY OR YOUR PETS?: NO

## 2023-11-02 ASSESSMENT — COGNITIVE AND FUNCTIONAL STATUS - GENERAL
DAILY ACTIVITIY SCORE: 24
DAILY ACTIVITIY SCORE: 24
MOBILITY SCORE: 24
MOBILITY SCORE: 24
PATIENT BASELINE BEDBOUND: NO

## 2023-11-02 ASSESSMENT — COLUMBIA-SUICIDE SEVERITY RATING SCALE - C-SSRS
2. HAVE YOU ACTUALLY HAD ANY THOUGHTS OF KILLING YOURSELF?: NO
6. HAVE YOU EVER DONE ANYTHING, STARTED TO DO ANYTHING, OR PREPARED TO DO ANYTHING TO END YOUR LIFE?: NO
1. IN THE PAST MONTH, HAVE YOU WISHED YOU WERE DEAD OR WISHED YOU COULD GO TO SLEEP AND NOT WAKE UP?: NO

## 2023-11-02 ASSESSMENT — PAIN - FUNCTIONAL ASSESSMENT: PAIN_FUNCTIONAL_ASSESSMENT: 0-10

## 2023-11-02 ASSESSMENT — ACTIVITIES OF DAILY LIVING (ADL)
GROOMING: INDEPENDENT
PATIENT'S MEMORY ADEQUATE TO SAFELY COMPLETE DAILY ACTIVITIES?: YES
BATHING: INDEPENDENT
DRESSING YOURSELF: INDEPENDENT
WALKS IN HOME: INDEPENDENT
HEARING - LEFT EAR: DIFFICULTY WITH NOISE
ADEQUATE_TO_COMPLETE_ADL: YES
LACK_OF_TRANSPORTATION: NO
JUDGMENT_ADEQUATE_SAFELY_COMPLETE_DAILY_ACTIVITIES: YES
FEEDING YOURSELF: INDEPENDENT
ASSISTIVE_DEVICE: WALKER
HEARING - RIGHT EAR: DIFFICULTY WITH NOISE
TOILETING: INDEPENDENT

## 2023-11-02 ASSESSMENT — PAIN SCALES - GENERAL
PAINLEVEL_OUTOF10: 0 - NO PAIN

## 2023-11-02 ASSESSMENT — LIFESTYLE VARIABLES
AUDIT-C TOTAL SCORE: 1
PRESCIPTION_ABUSE_PAST_12_MONTHS: NO
SKIP TO QUESTIONS 9-10: 1
AUDIT-C TOTAL SCORE: 1
HOW OFTEN DO YOU HAVE A DRINK CONTAINING ALCOHOL: MONTHLY OR LESS
HOW OFTEN DO YOU HAVE 6 OR MORE DRINKS ON ONE OCCASION: NEVER
HOW MANY STANDARD DRINKS CONTAINING ALCOHOL DO YOU HAVE ON A TYPICAL DAY: 1 OR 2
SUBSTANCE_ABUSE_PAST_12_MONTHS: NO

## 2023-11-02 ASSESSMENT — PATIENT HEALTH QUESTIONNAIRE - PHQ9
SUM OF ALL RESPONSES TO PHQ9 QUESTIONS 1 & 2: 0
2. FEELING DOWN, DEPRESSED OR HOPELESS: NOT AT ALL
1. LITTLE INTEREST OR PLEASURE IN DOING THINGS: NOT AT ALL

## 2023-11-02 NOTE — PROGRESS NOTES
Son in room with patient. He and his sisters assist patient as needed. Patient no longer drives due to neuropathy in his legs. Uses a Rolator for ambulation. No home going needs at this time.     Gwendolyn Watson RN

## 2023-11-02 NOTE — SIGNIFICANT EVENT
Clinical event note:  Patient admitted overnight to hospitalist [facility] for midsternal chest pain which as of this am on exam has resolved  Troponin on admission 16,17, 22    On room air with no conversational dyspnea, no complaints of shortness of breath  CXR no evidence of acute cardiopulmonary process  Patient seen and examined this am, denies any chest pain, shortness of breath, diaphoresis, lightheadedness, palpitations, abd pain or n/v  Was supposed to have follow up with Dr. Jara outpatient today  He states over the course of the past several weeks he has been getting lightheaded at home and he checks his heart rate and it is dipping into the 30's. He has to get up and start moving briskly until it rises.  He states previous hx of taking coumadin and developed DVT in lower right extremity while taking and was switched to Xarelto   He now has complaints of pain warmth and redness in his left lower extremity, pain from wearing compression stocking on his left extremity and had to have it removed. He is currently being treated for left lower leg cellulitis and was supposed to have completed 7 days of Keflex today, he was placed on Zosyn in the ED, due to increased redness, swelling  US duplex ordered  Echo was ordered on admission completed and pending read  Cr at or near baseline  Vitals signs reviewed and currently stable, however, bradycardia in the 40's observed  Will wait for cardiology recommendations

## 2023-11-02 NOTE — H&P
"History Of Present Illness  Hugo Weaver is a 89 y.o. male presenting with chief complaint of midsternal chest discomfort with no reports of radiation to the neck or left upper extremity.  Patient does endorse some right shoulder pain which seems to be acute onset nature however unclear if it is associated with his chest discomfort.  Patient states his pain at the time of arrival was 6 out of 10.  No associated diaphoresis, nausea, vomiting.  States that he follows with Dr. Jara in the outpatient setting.  At the time of arrival, noted to be bradycardic with bigeminy and PVCs.  Denies any history of lightheadedness or dizziness attributed to his dysrhythmia and patient denies any syncopal episodes.  Patient's daughter who is present at bedside confirms this.  She states he is not somebody that \"falls all the time\".  Patient states that in addition to the chest discomfort, patient states he is concerned that his left lower extremity cellulitis seems to be worsening after initially improving on Keflex.    On arrival, cardiac work-up initiated with first troponin of 17 with follow-up noted to be 22.  Patient noted to have elevation in his BNP, 468.  Last echocardiogram obtained in March of this year shows an EF of approximately 40 to 45%.  WBC within normal limits.  Chest x-ray obtained negative for acute cardiopulmonary process.  Continues to be bradycardic on telemetry but asymptomatic.  Admitted for further management.     Past Medical History  Past Medical History:   Diagnosis Date    Dyspnea, unspecified     Dyspnea    Other specified postprocedural states     History of endoscopy    Personal history of other diseases of the circulatory system     History of aortic valve insufficiency    Personal history of other diseases of the circulatory system     History of mitral valve insufficiency    Personal history of other diseases of the circulatory system     Personal history of coronary atherosclerosis    " Personal history of other diseases of the digestive system 09/27/2018    History of biliary colic    Personal history of other endocrine, nutritional and metabolic disease     History of hyperlipidemia    Personal history of other endocrine, nutritional and metabolic disease     History of obesity    Personal history of other endocrine, nutritional and metabolic disease     History of type 1 diabetes mellitus    Personal history of other medical treatment     History of echocardiogram    Personal history of other specified conditions 12/30/2015    History of edema       Surgical History  Past Surgical History:   Procedure Laterality Date    CT ABDOMEN PELVIS ANGIOGRAM W AND/OR WO IV CONTRAST  3/18/2020    CT ABDOMEN PELVIS ANGIOGRAM W AND/OR WO IV CONTRAST 3/18/2020 [facility]    OTHER SURGICAL HISTORY  11/17/2014    Cardiac Catheter His Ablation    OTHER SURGICAL HISTORY  11/05/2018    Cholecystectomy laparoscopic    OTHER SURGICAL HISTORY  12/03/2019    Coronary artery bypass graft    OTHER SURGICAL HISTORY  07/25/2019    Gallbladder surgery        Social History  He reports that he has never smoked. He has never used smokeless tobacco. He reports that he does not currently use alcohol. He reports that he does not use drugs.    Family History  Family History   Problem Relation Name Age of Onset    Diabetes Mother      Other (Cardiac disorder) Mother      No Known Problems Father          Allergies  Atorvastatin and Simvastatin    Review of Systems   Cardiovascular:  Positive for chest pain and leg swelling.   All other systems reviewed and are negative.       Physical Exam  Vitals reviewed.   Constitutional:       Appearance: Normal appearance.   HENT:      Head: Normocephalic and atraumatic.      Nose: Nose normal.      Mouth/Throat:      Mouth: Mucous membranes are moist.   Eyes:      Extraocular Movements: Extraocular movements intact.      Conjunctiva/sclera: Conjunctivae normal.      Pupils: Pupils  "are equal, round, and reactive to light.   Cardiovascular:      Rate and Rhythm: Bradycardia present. Rhythm irregular.      Pulses: Normal pulses.      Heart sounds: Normal heart sounds.   Pulmonary:      Effort: Pulmonary effort is normal.      Breath sounds: Normal breath sounds.   Abdominal:      General: Bowel sounds are normal.      Palpations: Abdomen is soft.   Musculoskeletal:         General: Normal range of motion.      Cervical back: Normal range of motion and neck supple.   Skin:     Comments: Diffuse region of erythema involving the distal left lower extremity.  Not clearly demarcated.  There is minimal warmth to palpation.  There are no open wounds, lesions, or other abnormal areas of skin breakdown.  No drainage or discharge   Neurological:      General: No focal deficit present.      Mental Status: He is alert. Mental status is at baseline.   Psychiatric:         Mood and Affect: Mood normal.         Behavior: Behavior normal.          Last Recorded Vitals  Blood pressure 138/64, pulse 59, temperature 36.5 °C (97.7 °F), temperature source Temporal, resp. rate 23, height 1.854 m (6' 1\"), weight 79.4 kg (175 lb), SpO2 97 %.    Relevant Results    Scheduled medications  pantoprazole, 40 mg, oral, Daily before breakfast   Or  pantoprazole, 40 mg, intravenous, Daily before breakfast  perflutren lipid microspheres, 0.5-10 mL of dilution, intravenous, Once in imaging  perflutren protein A microsphere, 0.5 mL, intravenous, Once in imaging  piperacillin-tazobactam, 3.375 g, intravenous, q6h  rivaroxaban, 15 mg, oral, Daily with evening meal  sulfur hexafluoride microsphr, 2 mL, intravenous, Once in imaging      Continuous medications     PRN medications  PRN medications: acetaminophen **OR** acetaminophen **OR** acetaminophen, acetaminophen **OR** acetaminophen **OR** acetaminophen, melatonin, metoclopramide **OR** metoclopramide, ondansetron ODT **OR** ondansetron, polyethylene glycol  Results for orders " placed or performed during the hospital encounter of 11/01/23 (from the past 24 hour(s))   CBC and Auto Differential   Result Value Ref Range    WBC 7.0 4.4 - 11.3 x10*3/uL    nRBC 0.0 0.0 - 0.0 /100 WBCs    RBC 4.54 4.50 - 5.90 x10*6/uL    Hemoglobin 14.1 13.5 - 17.5 g/dL    Hematocrit 43.9 41.0 - 52.0 %    MCV 97 80 - 100 fL    MCH 31.1 26.0 - 34.0 pg    MCHC 32.1 32.0 - 36.0 g/dL    RDW 13.8 11.5 - 14.5 %    Platelets 205 150 - 450 x10*3/uL    Neutrophils % 67.9 40.0 - 80.0 %    Immature Granulocytes %, Automated 1.1 (H) 0.0 - 0.9 %    Lymphocytes % 14.9 13.0 - 44.0 %    Monocytes % 9.7 2.0 - 10.0 %    Eosinophils % 5.3 0.0 - 6.0 %    Basophils % 1.1 0.0 - 2.0 %    Neutrophils Absolute 4.73 1.60 - 5.50 x10*3/uL    Immature Granulocytes Absolute, Automated 0.08 0.00 - 0.50 x10*3/uL    Lymphocytes Absolute 1.04 0.80 - 3.00 x10*3/uL    Monocytes Absolute 0.68 0.05 - 0.80 x10*3/uL    Eosinophils Absolute 0.37 0.00 - 0.40 x10*3/uL    Basophils Absolute 0.08 0.00 - 0.10 x10*3/uL   Comprehensive Metabolic Panel   Result Value Ref Range    Glucose 138 (H) 74 - 99 mg/dL    Sodium 135 (L) 136 - 145 mmol/L    Potassium 4.6 3.5 - 5.3 mmol/L    Chloride 102 98 - 107 mmol/L    Bicarbonate 22 21 - 32 mmol/L    Anion Gap 16 10 - 20 mmol/L    Urea Nitrogen 36 (H) 6 - 23 mg/dL    Creatinine 1.43 (H) 0.50 - 1.30 mg/dL    eGFR 46 (L) >60 mL/min/1.73m*2    Calcium 9.7 8.6 - 10.3 mg/dL    Albumin 4.6 3.4 - 5.0 g/dL    Alkaline Phosphatase 36 33 - 136 U/L    Total Protein 7.3 6.4 - 8.2 g/dL    AST 16 9 - 39 U/L    Bilirubin, Total 0.6 0.0 - 1.2 mg/dL    ALT 11 10 - 52 U/L   Magnesium   Result Value Ref Range    Magnesium 1.87 1.60 - 2.40 mg/dL   B-type natriuretic peptide   Result Value Ref Range     (H) 0 - 99 pg/mL   Troponin I, High Sensitivity, Initial   Result Value Ref Range    Troponin I, High Sensitivity 17 0 - 20 ng/L   Troponin, High Sensitivity, 1 Hour   Result Value Ref Range    Troponin I, High Sensitivity 22 (H) 0  - 20 ng/L     XR chest 1 view    Result Date: 11/2/2023  Interpreted By:  Christopher Waters, STUDY: XR CHEST 1 VIEW;  11/2/2023 12:40 am   INDICATION: Signs/Symptoms:Chest Pain.   COMPARISON: Chest radiograph dated 03/06/2022.   ACCESSION NUMBER(S): [id number]   ORDERING CLINICIAN: ANGY MUNSON   FINDINGS:   CARDIOMEDIASTINAL SILHOUETTE: Cardiomediastinal silhouette is stable in size and configuration. Sternotomy wires are unchanged.   LUNGS/PLEURA: There are unchanged right infrahilar calcified lymph nodes and/or granulomas. There are no consolidations.There are no pleural effusions. There is no demonstrated pneumothorax.     BONES: No evidence of acute osseous abnormality.       1.  No evidence of acute cardiopulmonary process.     Signed by: Christopher Waters 11/2/2023 12:55 AM Dictation workstation:   [id number]    ECG 12 lead    Result Date: 11/1/2023  (AFIB)(AFL) PVC's Left axis deviation Left bundle branch block Abnormal ECG When compared with ECG of 27-OCT-2023 13:25, (unconfirmed) Current undetermined rhythm precludes rhythm comparison, needs review Confirmed by DOANLD Shin ([id number]) on 11/1/2023 6:32:16 PM    ECG 12 lead    Result Date: 11/1/2023  (AFIB)(AFL) PVC's Left axis deviation Left bundle branch block Abnormal ECG When compared with ECG of 06-MAR-2023 13:27, Current undetermined rhythm precludes rhythm comparison, needs review Left bundle branch block has replaced Nonspecific intraventricular block Confirmed by DONALD Shin ([id number]) on 11/1/2023 6:31:40 PM    Vascular US lower extremity venous duplex left    Result Date: 10/27/2023            [facility] [address] [place] [address]     Tel [phone] Fax [phone]  Vascular Lab Report  VASC US LOWER EXTREMITY VENOUS DUPLEX LEFT Patient Name:     VPIUL CHRISTIAN    Reading           [id number] Johanne Eden MD,                                      Physician:        SRIRAM Study Date:       10/27/2023         Ordering          [id number]  STEVE PATEL                                      Provider: MRN/PID:          [id number]           Fellow: Accession#:       [id number]       Technologist:     Swathi Underwood RVT Date of           9/7/1931 / 89      Technologist 2: Birth/Age:        years Gender:           M                  Encounter#:       [id number] Admission Status: Emergency          Location          [facility]                                      Performed:  Diagnosis/ICD: Cellulitus of left lower limb-L03.116; Generalized edema                (anasarca)-R60.1 Indication:    Limb edema CPT Codes:     95015 Peripheral venous duplex scan for DVT Limited  Pertinent History: Anticoagulation and Previous DVT.  CONCLUSIONS: Right Lower Venous: Right common femoral vein is negative for deep vein thrombus. Left Lower Venous: Cannot rule out thrombus in non-visualized posterior tibial and peroneal veins due to edema and shadowing artifact from artery calcification. The remainder of the left leg is negative for deep vein thrombosis. Additional Findings: TECHNICALLY DIFFICULT EXAM due to edema and shadowing artifact.  Comparison: Compared with study from 12/5/2022, no significant change.  Imaging & Doppler Findings:  Right Compressible Thrombus        Flow CFV       Yes        None   Spontaneous/Phasic  Left                  Compress Thrombus        Flow Distal External Iliac   Yes      None   Spontaneous/Phasic CFV                     Yes      None   Spontaneous/Phasic PFV                     Yes      None FV Proximal             Yes      None   Spontaneous/Phasic FV Mid                  Yes      None FV Distal               Yes      None Popliteal               Yes      None   Spontaneous/Phasic  [id number] SRIRAM Dave MD Electronically signed by [id number] SRIRAM Dave MD on 10/27/2023 at 6:52:07 PM  ** Final **         Assessment/Plan   Principal Problem:    Other chest pain  Active Problems:    Atrial fibrillation (CMS/HCC)     Chronic kidney disease, stage III (moderate) (CMS/[facility])    CHF (congestive heart failure) (CMS/[facility])    Bradycardia    Chest pain      Patient presents for evaluation of substernal chest pain as well as concern for persistent lower extremity cellulitis and increased lower extremity edema.    Cellulitis of the left anterior lower extremity evident without clearly demarcated lines.  No drainage or discharge.  Patient and daughter note that symptoms seem to have worsened after some initial improvement on Keflex.  Due to the lack of clinical improvement, will escalate to IV therapy and monitor clinical course.  Low threshold for transitioning back to oral agents, possibly with broader spectrum of activity.  In addition to this, discussed with patient the need to keep his legs elevated at all times as tolerated to encourage lymphatic return    Outpatient cardiology records reviewed.  Follows with Dr. Jara in the outpatient setting.  Last echocardiogram dated March 2022.  Noted at that time to have an ejection fraction of approximately 40 to 45%.  Patient noted to be historically intolerant to beta-blockers due to his bradycardia.  Previously discussed in the outpatient setting the utility of pacemaker which patient has historically declined.  In addition, has been reluctant to accept significant changes to his medication regiment.    EKG without clear acute injury pattern however given his dysrhythmia, would defer to cardiology interpretation.  Cardiology service has been consulted.  Appreciate recommendations and management.    Greater than 6 months of the lab since prior echocardiogram.  Given his worsening lower extremity edema, atypical EKG findings, repeat echocardiogram to be obtained at this time.    Patient's home Xarelto has been resumed    I spent >75 minutes in the professional and overall care of this patient.      Brad Cervantes, DO

## 2023-11-02 NOTE — ED PROVIDER NOTES
"HPI   Chief Complaint   Patient presents with    Chest Pain     Midsternal CP, 4/10 \"pressure\"       HPI     89-year-old male with history of CAD with bypass in 2005, A-fib on Xarelto, RLE DVT, peripheral neuropathy with a history of hyperglycemia per patient that resolved but the neuropathy persists, cellulitis of his LLE on keflex presenting to [facility] ED with chest pain that is centered mid chest without radiation though he did have R shoulder pain yesterday. His pain is 6/10. He did not take aspirin for his CP.  He denies dyspnea, n/v/f/c, and endorses occassional diarrhea for which he takes a fiber supplement and imodium. His BNP is significantly elevated here compared to 10/27/23.  EKG signif for bigeminy and PVCs. He did have bradycardia here as well. He also has mild tenderness of his legs bilaterally. He endorsed RLQ abdominal pain that is intermittent and nontender.      Allergies   Allergen Reactions    Atorvastatin Unknown    Simvastatin Unknown             Miles Coma Scale Score: 15                  Patient History   Past Medical History:   Diagnosis Date    Dyspnea, unspecified     Dyspnea    Other specified postprocedural states     History of endoscopy    Personal history of other diseases of the circulatory system     History of aortic valve insufficiency    Personal history of other diseases of the circulatory system     History of mitral valve insufficiency    Personal history of other diseases of the circulatory system     Personal history of coronary atherosclerosis    Personal history of other diseases of the digestive system 09/27/2018    History of biliary colic    Personal history of other endocrine, nutritional and metabolic disease     History of hyperlipidemia    Personal history of other endocrine, nutritional and metabolic disease     History of obesity    Personal history of other endocrine, nutritional and metabolic disease     History of type 1 diabetes mellitus    Personal history " [address] [facility]  [address] [place] [address] [address] [place] [address]  Dept: [phone]  Dept Fax: [phone]    New Patient Visit Note  Date of patient's visit: 7/16/2020  Patient's Name:  Dwayne Shi YOB: 1998            Hany CARBONE  ______________________________________________________________________    Reason for visit: Establish care/Preventative care  ______________________________________________________________________  Chief Compliant   Establish Care      ______________________________________________________________________  History of Presenting Illness:  History was obtained from the patient and mom Dwayne Shi is a 25 y.o. is here to establish care. Patient has no diagnosed chronic medical conditions. Patient was in the emergency room last year for headache. He was given Imitrex at that time. Patient states that he is not had to use Imitrex since then. Patient and mom states that the patient has had chronic diarrhea intermittently his whole life. Patient states that 1 to 2 days a week he will have multiple loose stools in 1 day with some abdominal cramping which resolves after bowel movements. He denies any blood in the stools but does state that he has excess mucus on the days when he has loose stools. Patient has trouble gaining weight but denies any significant weight change. He does state that he feels full quickly when he eats and does not have much of an appetite. Patient has never been scoped. Patient does have intermittent nausea but no vomiting or difficulty swallowing. No family history of inflammatory bowel disease    ______________________________________________________________________  Past Medical/Surgical History:        Diagnosis Date    Abdominal pain     Asperger's syndrome     Diarrhea     Vomiting     Weight loss        History reviewed.  No pertinent surgical history. ______________________________________________________________________  Health Maintenance Due   Topic Date Due    Varicella vaccine (2 of 2 - 2-dose childhood series) 04/03/2002    HIV screen  04/03/2013    HPV vaccine (2 - Male 3-dose series) 09/17/2018       No Known Allergies      Current Outpatient Medications   Medication Sig Dispense Refill    SUMAtriptan (IMITREX) 50 MG tablet Take 1 tablet by mouth once as needed for Migraine 9 tablet 1     No current facility-administered medications for this visit. Social History     Tobacco Use    Smoking status: Never Smoker    Smokeless tobacco: Never Used   Substance Use Topics    Alcohol use: No    Drug use: No       Family History   Problem Relation Age of Onset    Celiac Disease Other     Diabetes Other     Irritable Bowel Syndrome Other     Other Other         Gallstones, Lactose Intolerance      ______________________________________________________________________  Review of Systems   Constitutional: Negative for appetite change, chills, diaphoresis, fatigue, fever and unexpected weight change. HENT: Negative for congestion, ear discharge, ear pain, postnasal drip, rhinorrhea, sinus pressure, sinus pain, sore throat, tinnitus and trouble swallowing. Eyes: Negative for photophobia, redness and visual disturbance. Respiratory: Negative for cough, chest tightness, shortness of breath and wheezing. Cardiovascular: Negative for chest pain, palpitations and leg swelling. Gastrointestinal: Positive for abdominal pain, diarrhea and nausea. Negative for abdominal distention, anal bleeding, blood in stool, constipation, rectal pain and vomiting. Endocrine: Negative. Genitourinary: Negative for decreased urine volume, difficulty urinating, dysuria, frequency, hematuria and urgency. Musculoskeletal: Negative for arthralgias, back pain, gait problem, joint swelling, myalgias, neck pain and neck stiffness.    Skin: Negative for of other medical treatment     History of echocardiogram    Personal history of other specified conditions 12/30/2015    History of edema     Past Surgical History:   Procedure Laterality Date    CT ABDOMEN PELVIS ANGIOGRAM W AND/OR WO IV CONTRAST  3/18/2020    CT ABDOMEN PELVIS ANGIOGRAM W AND/OR WO IV CONTRAST 3/18/2020 [facility]    OTHER SURGICAL HISTORY  11/17/2014    Cardiac Catheter His Ablation    OTHER SURGICAL HISTORY  11/05/2018    Cholecystectomy laparoscopic    OTHER SURGICAL HISTORY  12/03/2019    Coronary artery bypass graft    OTHER SURGICAL HISTORY  07/25/2019    Gallbladder surgery     Family History   Problem Relation Name Age of Onset    Diabetes Mother      Other (Cardiac disorder) Mother      No Known Problems Father       Social History     Tobacco Use    Smoking status: Never    Smokeless tobacco: Never   Substance Use Topics    Alcohol use: Not Currently    Drug use: Never       Physical Exam   ED Triage Vitals [11/01/23 2103]   Temp Heart Rate Resp BP   36.5 °C (97.7 °F) 59 21 179/77      SpO2 Temp Source Heart Rate Source Patient Position   100 % Temporal Monitor Lying      BP Location FiO2 (%)     Right arm --       Physical Exam  General: Alert, no acute distress, well developed, Well hydrated  Head: NCAT  Eyes: Clear conjunctiva, EOMI  ENT: Moist mucosa, no lymphadenopathy  Respiratory: Normal respiratory effort. CTAB. Symmetric aeration. No wheezes, rales, or Rhonchi.  CV: Irregular rhythm, Normal Rate, pulses present bilaterally  GI: Soft, NT, no guarding, BS normoactive, No distention   : no flank tenderness, no cva tenderness  MSK: Atraumatic. Full ROM in extremities. Bilateral symmetric intact strength. No pedal edema.  Skin: Warm, c/d/I, erythematous edematous LLE with gradual borders  Neuro: AOx3, facial symmetry, decreased sensation in lower extremities, motor intact in extremities, CN intact, Nonfocal neurological exam    ED Course & MDM   Diagnoses as of 11/01/23  2227   Chest pain, unspecified type       Medical Decision Making      No orders to display     Labs Reviewed   CBC WITH AUTO DIFFERENTIAL - Abnormal       Result Value    WBC 7.0      nRBC 0.0      RBC 4.54      Hemoglobin 14.1      Hematocrit 43.9      MCV 97      MCH 31.1      MCHC 32.1      RDW 13.8      Platelets 205      Neutrophils % 67.9      Immature Granulocytes %, Automated 1.1 (*)     Lymphocytes % 14.9      Monocytes % 9.7      Eosinophils % 5.3      Basophils % 1.1      Neutrophils Absolute 4.73      Immature Granulocytes Absolute, Automated 0.08      Lymphocytes Absolute 1.04      Monocytes Absolute 0.68      Eosinophils Absolute 0.37      Basophils Absolute 0.08     COMPREHENSIVE METABOLIC PANEL - Abnormal    Glucose 138 (*)     Sodium 135 (*)     Potassium 4.6      Chloride 102      Bicarbonate 22      Anion Gap 16      Urea Nitrogen 36 (*)     Creatinine 1.43 (*)     eGFR 46 (*)     Calcium 9.7      Albumin 4.6      Alkaline Phosphatase 36      Total Protein 7.3      AST 16      Bilirubin, Total 0.6      ALT 11     B-TYPE NATRIURETIC PEPTIDE - Abnormal     (*)     Narrative:        <100 pg/mL - Heart failure unlikely  100-299 pg/mL - Intermediate probability of acute heart                  failure exacerbation. Correlate with clinical                  context and patient history.    >=300 pg/mL - Heart Failure likely. Correlate with clinical                  context and patient history.    BNP testing is performed using different testing methodology at [facility] than at other [facility] hospitals. Direct result comparisons should only be made within the same method.      MAGNESIUM - Normal    Magnesium 1.87     SERIAL TROPONIN-INITIAL - Normal    Troponin I, High Sensitivity 17      Narrative:     Less than 99th percentile of normal range cutoff-  Female and children under 18 years old <14 ng/L; Male <21 ng/L: Negative  Repeat testing should be performed if clinically indicated.      Female and children under 18 years old 14-50 ng/L; Male 21-50 ng/L:  Consistent with possible cardiac damage and possible increased clinical   risk. Serial measurements may help to assess extent of myocardial damage.     >50 ng/L: Consistent with cardiac damage, increased clinical risk and  myocardial infarction. Serial measurements may help assess extent of   myocardial damage.      NOTE: Children less than 1 year old may have higher baseline troponin   levels and results should be interpreted in conjunction with the overall   clinical context.     NOTE: Troponin I testing is performed using a different   testing methodology at [facility] than at other   [facility]. Direct result comparisons should only   be made within the same method.   TROPONIN SERIES- (INITIAL, 1 HR)    Narrative:     The following orders were created for panel order Troponin I Series, High Sensitivity (0, 1 HR).  Procedure                               Abnormality         Status                     ---------                               -----------         ------                     Troponin I, High Sensiti...[[id number]]  Normal              Final result               Troponin, High Sensitivi...[[id number]]                      In process                   Please view results for these tests on the individual orders.   SERIAL TROPONIN, 1 HOUR      89-year-old male with history of CAD with bypass in 2005, A-fib on Xarelto, RLE DVT, peripheral neuropathy with a history of hyperglycemia per patient that resolved but the neuropathy persists, cellulitis of his LLE on keflex presenting to [facility] ED with chest pain that is centered mid chest without radiation though he did have R shoulder pain yesterday. His pain is 6/10. He did not take aspirin for his CP.  He denies dyspnea, n/v/f/c, and endorses occassional diarrhea for which he takes a fiber supplement and imodium. His BNP is significantly elevated here compared to  Pulmonary:      Effort: Pulmonary effort is normal. No respiratory distress. Breath sounds: Normal breath sounds and air entry. No stridor, decreased air movement or transmitted upper airway sounds. No decreased breath sounds, wheezing, rhonchi or rales. Chest:      Chest wall: No tenderness. Abdominal:      General: Abdomen is flat. Bowel sounds are normal. There is no distension. Palpations: Abdomen is soft. There is no mass. Tenderness: There is no abdominal tenderness. There is no right CVA tenderness, left CVA tenderness, guarding or rebound. Negative signs include Lopez's sign, Rovsing's sign, McBurney's sign, psoas sign and obturator sign. Hernia: No hernia is present. Musculoskeletal: Normal range of motion. General: No tenderness. Lymphadenopathy:      Head:      Right side of head: No submental, submandibular, tonsillar, preauricular, posterior auricular or occipital adenopathy. Left side of head: No submental, submandibular, tonsillar, preauricular or posterior auricular adenopathy. Cervical: No cervical adenopathy. Right cervical: No superficial, deep or posterior cervical adenopathy. Left cervical: No superficial, deep or posterior cervical adenopathy. Upper Body:      Right upper body: No supraclavicular adenopathy. Left upper body: No supraclavicular adenopathy. Skin:     General: Skin is warm and dry. Coloration: Skin is not pale. Findings: No erythema or rash. Neurological:      General: No focal deficit present. Mental Status: He is alert and oriented to person, place, and time. Cranial Nerves: Cranial nerves are intact. No cranial nerve deficit. Sensory: Sensation is intact. Motor: Motor function is intact. Coordination: Coordination is intact. Coordination normal.      Gait: Gait is intact. Deep Tendon Reflexes: Reflexes are normal and symmetric.    Psychiatric:         Attention and 10/27/23.  EKG signif for bigeminy and PVCs.Troponin 17. Repeat Tn 22 delta 5 uptrending. EKG and Tn evaluated for ACS rule out.   Wbc wnl decreasing concern for acute infection.   GFR of 46, may necessitate further nephro workup. Further eval of RLQ abdominal discomfort per admitting team. BNP of 468 increased from 134 on 10/27/23 and patient will benefit from echo.  Ongoing bigeminy, bradycardia and PVCs with prior AF on xarelto along with the above necessitate admission; discussed with Dr. Cervantes. Patient was admitted. Patient is still on floor and signed out to Ruma Mcclain.    External Records Reviewed: I reviewed recent and relevant outside records including: none  Independent Interpretation of Studies: I independently interpreted: As above  Social Determinants Affecting Care: Diagnostic testing considered: As above    I reviewed the case with the attending ER physician. Patient and/or patient´s representative was counseled regarding labs, imaging, likely diagnosis, and plan.     Horace Almanza MD MS  PGY-1, Emergency Medicine    The above documentation was completed with the use of speech recognition software. It may contain dictation errors secondary to limitations of the software.        Horace Almanza MD  Resident  11/02/23 0201       Horace Almanza MD  Resident  11/02/23 0202     Perception: Attention and perception normal.         Mood and Affect: Mood and affect normal.         Speech: Speech normal.         Behavior: Behavior normal. Behavior is cooperative. Thought Content: Thought content normal.         Cognition and Memory: Cognition and memory normal.         Judgment: Judgment normal.         Vitals:    07/16/20 0913   BP: 106/66   Site: Left Upper Arm   Position: Sitting   Cuff Size: Small Adult   Pulse: 105   Resp: 16   Temp: 97.5 °F (36.4 °C)   TempSrc: Temporal   Weight: 119 lb (54 kg)   Height: 5' 10.98\" (1.803 m)     BP Readings from Last 3 Encounters:   07/16/20 106/66   07/29/19 103/68   09/28/12 (!) 88/52 (<1 %, Z <-2.33 /  14 %, Z = -1.10)*     *BP percentiles are based on the 2017 AAP Clinical Practice Guideline for boys          ______________________________________________________________________  Diagnostic findings:  CBC:  Lab Results   Component Value Date    WBC 5.6 07/29/2019    HGB 13.3 07/29/2019     07/29/2019       BMP:    Lab Results   Component Value Date     07/29/2019    K 3.9 07/29/2019     07/29/2019    CO2 25 07/29/2019    BUN 9 07/29/2019    CREATININE 0.70 07/29/2019    GLUCOSE 108 07/29/2019       HEMOGLOBIN A1C: No results found for: LABA1C    FASTING LIPID PANEL:No results found for: CHOL, HDL, TRIG    No results found for this visit on 07/16/20.    ______________________________________________________________________  Assessment and Plan:  [facility] was seen today for establish care. Diagnoses and all orders for this visit:    Encounter for medical examination to establish care  -     CBC Auto Differential; Future  -     Comprehensive Metabolic Panel; Future  -     Lipase; Future  -     Lipid, Fasting; Future  -     TSH With Reflex Ft4; Future  -     Vitamin D 25 Hydroxy; Future  -     Iron And TIBC; Future  -     Ferritin; Future  -     Vitamin B12 & Folate;  Future    History of Asperger's syndrome    Chronic

## 2023-11-02 NOTE — CONSULTS
Reason For Consult  ACS r/o    History Of Present Illness  89-year-old male with a past medical history of LBBB, symptomatic bradycardia, hyperlipidemia coronary artery disease status post CABG (2003 LIMA to LAD, VG to OM and VG to right PDA), peripheral neuropathy, atrial fibrillation on Xarelto, chronic systolic heart failure (3/23 EF 40 to 45%), abdominal aortic aneurysm status postrepair, diabetes mellitus, CKD , presented to [facility] 11/1/2023 with complaints of substernal chest discomfort..  Cardiology has been consulted for the concerns of ACS in the setting of chest pain patient is currently being managed for the same as well as left lower extremity cellulitis and patient.  Patient reports 1 episode of chest pain that started after dinner yesterday while he was sitting up on a chair, lasted for few minutes, with substernal in location, nonradiating, pressure-like in character, 6-7/10 in intensity, no aggravating or alleviating factors.  Patient called EMS and received aspirin nitroglycerin that partially relieved the chest pain.  Patient reports that he has been feeling tired, has not been eating well and was experiencing chills since noon yesterday.  He reported nausea as well.  He denied any vomiting, diaphoresis, lightheadedness, loss of consciousness, shortness of breath, palpitations.  Last Bumex use was 2 weeks ago-2 tablets.  He was recently seen in the ER at [facility] 1 week ago as well was for cellulitis and discharged on Keflex for 1 week.    Chart review:  Most recent outpatient follow-up with Dr. Jara 10/12/2023.  Patient was scheduled for a follow-up appointment today with Swathi for the concerns of bradycardia, referral recommended during prior ER visit.    Duplex ultrasound lower extremity stent/20 7/2020:Right Lower Venous: Right common femoral vein is negative for deep vein thrombus.  Left Lower Venous: Cannot rule out thrombus in non-visualized posterior tibial and peroneal veins due to edema and  shadowing artifact from artery calcification. The remainder of the left leg is negative for deep vein thrombosis    Echo 3/1/2023: Ejection fraction 40 to 45%.  Abnormal septal motion consistent with LBBB, with reduced RV systolic function.  Severe LA enlargement and mild RA enlargement.  Moderate AR.    Lexiscan nuclear stress test 12/21: No evidence of ischemia.  Possible scarring distal anterior and distal septal wall extending to the apex.  Ejection fraction 53%    24-hour Holter monitor 2/21: A-fib noted 100% of the time with average heart rate 57.    Echo 6/15: Ejection fraction 30 to 35% with mild-moderate LV enlargement and RA enlargement.  Mild AR and TR    Cardiac cath 12/2007: Mid circumflex-95 to 99% stenosis, right PDA 80% stenosis.  Vein graft to PDA occluded.  Vein graft to OM patent.  LIMA was atretic and nonfunctional within anterograde flow through the native LAD system.    Hospital course:  Initial vitals patient was afebrile, heart rate 59, respiratory rate 21, blood pressure 179/77, satting well on room air  Current vitals: afebrile, pulse rate 95-lowest over 24 hours 48 at 3:45 AM today, respiratory rate 16, blood pressure 134/68, satting well on room air  Labs 11/1/23:  Hemoglobin 14 with platelets 205  Sodium 135, potassium 4.6  BUN 36, creatinine 1.4 around baseline  Magnesium 1.87  BNP 4068  Troponin 17, repeat 22    EKG sinus rhythm with frequent PVCs with bigeminy, LAD, LBBB, heart rate 68, QTc 452  Repeat EKG after 1 hour suggestive of atrial fibrillation with PVCs, LAD, LBBB    Chest x-ray no acute cardiopulmonary processes    Echo already ordered    Current inpatient medications: Xarelto 15, Zosyn, Protonix, Tylenol as needed, melatonin as needed, Reglan, Zofran, MiraLAX.  Low-sodium diet  Home medications Aldactone 25, pravastatin 20, Vasotec 10, Keflex, magnesium oxide, calcium vitamin D, Bumex 2 mg as needed    I/O 290/0, net  +290       Past Medical History  He has a past medical  history of Dyspnea, unspecified, Other specified postprocedural states, Personal history of other diseases of the circulatory system, Personal history of other diseases of the circulatory system, Personal history of other diseases of the circulatory system, Personal history of other diseases of the digestive system (09/27/2018), Personal history of other endocrine, nutritional and metabolic disease, Personal history of other endocrine, nutritional and metabolic disease, Personal history of other endocrine, nutritional and metabolic disease, Personal history of other medical treatment, and Personal history of other specified conditions (12/30/2015).    Surgical History  He has a past surgical history that includes Other surgical history (11/17/2014); Other surgical history (11/05/2018); Other surgical history (12/03/2019); Other surgical history (07/25/2019); and CT angio abdomen pelvis w and or wo IV IV contrast (3/18/2020).     Social History  He reports that he has never smoked. He has never used smokeless tobacco. He reports that he does not currently use alcohol. He reports that he does not use drugs.    Family History  Family History   Problem Relation Name Age of Onset    Diabetes Mother      Other (Cardiac disorder) Mother      No Known Problems Father          Allergies  Atorvastatin and Simvastatin    Review of Systems  CONSTITUTIONAL: Denies fever; reports chills  NEURO: Denies difficulty walking, numbness, weakness, tingling, headache.   HEENT: Denies sore throat, rhinorrhea, epistaxis, changes in vision.   CARDIO: Denies chest pain, palpitations  PULM: Denies shortness of breath, cough.   GI: Denies abdominal pain, nausea, diarrhea, vomiting, melena, hematochezia.  : Denies painful urination.   MSK: Denies edema; Denies leg swelling  SKIN: Denies rash, lesions.   ENDOCRINE: Denies unexpected weight-loss.   HEME: Denies bleeding disorder.        Physical Exam  Constitutional: Well developed,  well  "appearing adult in no acute distress.   NEURO: Alert and oriented. Moves all extremities. Face is symmetric and expressive. Sensation is intact over all dermatomal distributions of the right upper extremity. Strength is intact  EYES: PERRL. No scleral icterus or conjunctival injection. No discharge.   HENT: Normocephalic, atraumatic. Hearing is grossly intact. Nares grossly patent and without discharge. Mucous membranes moist.   NECK: No JVD. Patient moves neck without restriction.   CARDIO: Rhythm regular. Normal rate. No murmur, rub, or gallop. Pulses equal bilaterally in the upper and lower extremity.   PULM: Lungs clear to auscultation in all fields. No wheezes, rales, or rhonchi. No conversational dyspnea. No splinting, stridor, or accessory muscle use.    GI/: Abdomen is soft and non-tender. Normoactive bowel sounds.   EXTREMITIES: No clubbing, cyanosis, or deformity. No lower extremity edema. No tenderness along the deep venous distribution of the bilateral lower extremities  SKIN: Warm and dry. Normal turgor. No rash noted over the area of pain  PSYCH: Mood, affect, and interaction is appropriate to the setting.       Last Recorded Vitals  Blood pressure 134/68, pulse 95, temperature 36 °C (96.8 °F), temperature source Temporal, resp. rate 16, height 1.854 m (6' 1\"), weight 79.4 kg (175 lb), SpO2 100 %.    Relevant Results  Results for orders placed or performed during the hospital encounter of 11/01/23 (from the past 24 hour(s))   CBC and Auto Differential   Result Value Ref Range    WBC 7.0 4.4 - 11.3 x10*3/uL    nRBC 0.0 0.0 - 0.0 /100 WBCs    RBC 4.54 4.50 - 5.90 x10*6/uL    Hemoglobin 14.1 13.5 - 17.5 g/dL    Hematocrit 43.9 41.0 - 52.0 %    MCV 97 80 - 100 fL    MCH 31.1 26.0 - 34.0 pg    MCHC 32.1 32.0 - 36.0 g/dL    RDW 13.8 11.5 - 14.5 %    Platelets 205 150 - 450 x10*3/uL    Neutrophils % 67.9 40.0 - 80.0 %    Immature Granulocytes %, Automated 1.1 (H) 0.0 - 0.9 %    Lymphocytes % 14.9 13.0 - " 44.0 %    Monocytes % 9.7 2.0 - 10.0 %    Eosinophils % 5.3 0.0 - 6.0 %    Basophils % 1.1 0.0 - 2.0 %    Neutrophils Absolute 4.73 1.60 - 5.50 x10*3/uL    Immature Granulocytes Absolute, Automated 0.08 0.00 - 0.50 x10*3/uL    Lymphocytes Absolute 1.04 0.80 - 3.00 x10*3/uL    Monocytes Absolute 0.68 0.05 - 0.80 x10*3/uL    Eosinophils Absolute 0.37 0.00 - 0.40 x10*3/uL    Basophils Absolute 0.08 0.00 - 0.10 x10*3/uL   Comprehensive Metabolic Panel   Result Value Ref Range    Glucose 138 (H) 74 - 99 mg/dL    Sodium 135 (L) 136 - 145 mmol/L    Potassium 4.6 3.5 - 5.3 mmol/L    Chloride 102 98 - 107 mmol/L    Bicarbonate 22 21 - 32 mmol/L    Anion Gap 16 10 - 20 mmol/L    Urea Nitrogen 36 (H) 6 - 23 mg/dL    Creatinine 1.43 (H) 0.50 - 1.30 mg/dL    eGFR 46 (L) >60 mL/min/1.73m*2    Calcium 9.7 8.6 - 10.3 mg/dL    Albumin 4.6 3.4 - 5.0 g/dL    Alkaline Phosphatase 36 33 - 136 U/L    Total Protein 7.3 6.4 - 8.2 g/dL    AST 16 9 - 39 U/L    Bilirubin, Total 0.6 0.0 - 1.2 mg/dL    ALT 11 10 - 52 U/L   Magnesium   Result Value Ref Range    Magnesium 1.87 1.60 - 2.40 mg/dL   B-type natriuretic peptide   Result Value Ref Range     (H) 0 - 99 pg/mL   Troponin I, High Sensitivity, Initial   Result Value Ref Range    Troponin I, High Sensitivity 17 0 - 20 ng/L   Troponin, High Sensitivity, 1 Hour   Result Value Ref Range    Troponin I, High Sensitivity 22 (H) 0 - 20 ng/L   Transthoracic Echo (TTE) Complete   Result Value Ref Range    BSA 2.02 m2   CBC   Result Value Ref Range    WBC 6.3 4.4 - 11.3 x10*3/uL    nRBC 0.0 0.0 - 0.0 /100 WBCs    RBC 3.83 (L) 4.50 - 5.90 x10*6/uL    Hemoglobin 12.2 (L) 13.5 - 17.5 g/dL    Hematocrit 37.1 (L) 41.0 - 52.0 %    MCV 97 80 - 100 fL    MCH 31.9 26.0 - 34.0 pg    MCHC 32.9 32.0 - 36.0 g/dL    RDW 14.0 11.5 - 14.5 %    Platelets 181 150 - 450 x10*3/uL   Comprehensive metabolic panel   Result Value Ref Range    Glucose 125 (H) 74 - 99 mg/dL    Sodium 137 136 - 145 mmol/L    Potassium  4.2 3.5 - 5.3 mmol/L    Chloride 107 98 - 107 mmol/L    Bicarbonate 22 21 - 32 mmol/L    Anion Gap 12 10 - 20 mmol/L    Urea Nitrogen 30 (H) 6 - 23 mg/dL    Creatinine 1.27 0.50 - 1.30 mg/dL    eGFR 53 (L) >60 mL/min/1.73m*2    Calcium 8.8 8.6 - 10.3 mg/dL    Albumin 3.6 3.4 - 5.0 g/dL    Alkaline Phosphatase 26 (L) 33 - 136 U/L    Total Protein 5.6 (L) 6.4 - 8.2 g/dL    AST 13 9 - 39 U/L    Bilirubin, Total 0.6 0.0 - 1.2 mg/dL    ALT 9 (L) 10 - 52 U/L   Magnesium   Result Value Ref Range    Magnesium 1.68 1.60 - 2.40 mg/dL   Vascular US Lower Extremity Venous Duplex Bilateral   Result Value Ref Range    BSA 2.02 m2         Vascular US Lower Extremity Venous Duplex Bilateral    Result Date: 11/2/2023  Preliminary Cardiology Report            [facility] [address] [id number]     Tel [phone] Fax [phone]          Preliminary Vascular Lab Report  VASC US LOWER EXTREMITY VENOUS DUPLEX BILATERAL  Patient Name:     VIPUL Interiano Physician:  [id number] Geovany Petit DO Study Date:       11/2/2023       Ordering Provider:  [id number] BENZ MRN/PID:          [id number]        Fellow: Accession#:       [id number]    Technologist:       Pema Nolasco RVT, [facility] YOB: 1931        Technologist 2: Gender:           M               Encounter#:         [id number] Admission Status: Inpatient       Location Performed: [facility]  Diagnosis/ICD: Pain in left leg-M79.605; Other specified soft tissue                disorders-M79.89 Indication:    Limb swelling CPT Codes:     97966 Peripheral venous duplex scan for DVT complete  Pertinent History: Previous DVT, Anticoagulation and CAD. CHF, Afib                    History of chronic DVT in the right PTV on 3/7/23.  PRELIMINARY CONCLUSIONS:  Right Lower Venous: No evidence of acute deep vein thrombus visualized in the right lower extremity. Left Lower Venous: No evidence of acute deep vein thrombus visualized in  the left lower extremity.  Imaging & Doppler Findings:  Right                 Compressible Thrombus        Flow Distal External Iliac                None CFV                       Yes        None   Spontaneous/Phasic PFV                       Yes        None FV Proximal               Yes        None   Spontaneous/Phasic FV Mid                    Yes        None FV Distal                 Yes        None Popliteal                 Yes        None   Spontaneous/Phasic Peroneal                  Yes        None PTV                       Yes        None  Left                  Compress Thrombus        Flow Distal External Iliac            None CFV                     Yes      None   Spontaneous/Phasic PFV                     Yes      None FV Proximal             Yes      None   Spontaneous/Phasic FV Mid                  Yes      None FV Distal               Yes      None Popliteal               Yes      None   Spontaneous/Phasic Peroneal                Yes      None PTV                     Yes      None VASCULAR PRELIMINARY REPORT completed by Pema Nolasco RVT on 11/2/2023 at 11:42:38 AM  ** Final **     XR chest 1 view    Result Date: 11/2/2023  Interpreted By:  Christopher Waters, STUDY: XR CHEST 1 VIEW;  11/2/2023 12:40 am   INDICATION: Signs/Symptoms:Chest Pain.   COMPARISON: Chest radiograph dated 03/06/2022.   ACCESSION NUMBER(S): [id number]   ORDERING CLINICIAN: ANGY MUNSON   FINDINGS:   CARDIOMEDIASTINAL SILHOUETTE: Cardiomediastinal silhouette is stable in size and configuration. Sternotomy wires are unchanged.   LUNGS/PLEURA: There are unchanged right infrahilar calcified lymph nodes and/or granulomas. There are no consolidations.There are no pleural effusions. There is no demonstrated pneumothorax.     BONES: No evidence of acute osseous abnormality.       1.  No evidence of acute cardiopulmonary process.     Signed by: Christopher Waters 11/2/2023 12:55 AM Dictation workstation:   [id number]    ECG 12  lead    Result Date: 11/1/2023  (AFIB)(AFL) PVC's Left axis deviation Left bundle branch block Abnormal ECG When compared with ECG of 27-OCT-2023 13:25, (unconfirmed) Current undetermined rhythm precludes rhythm comparison, needs review Confirmed by DONALD Shin ([id number]) on 11/1/2023 6:32:16 PM    ECG 12 lead    Result Date: 11/1/2023  (AFIB)(AFL) PVC's Left axis deviation Left bundle branch block Abnormal ECG When compared with ECG of 06-MAR-2023 13:27, Current undetermined rhythm precludes rhythm comparison, needs review Left bundle branch block has replaced Nonspecific intraventricular block Confirmed by DONALD Shin ([id number]) on 11/1/2023 6:31:40 PM      ECHO 11/2/23    1. Left ventricular systolic function is mildly decreased with a 40-45% estimated ejection fraction.  2. Abnormal septal motion consistent with left bundle branch block.  3. There is mildly reduced right ventricular systolic function.  4. The left atrium is severely dilated.  5. The right atrium is moderately dilated.  6. RVSP within normal limits.  7. Moderate aortic valve regurgitation.  8. The patient is in atrial fibrillation which may influence the estimate of left ventricular function and transvalvular flows.  9. There is global hypokinesis of the left ventricle with minor regional variations.     Assessment/Plan     89-year-old male with a past medical history of LBBB, symptomatic bradycardia, hyperlipidemia coronary artery disease status post CABG (2003 LIMA to LAD, VG to OM and VG to right PDA), peripheral neuropathy, atrial fibrillation on Xarelto, chronic systolic heart failure (3/23 EF 40 to 45%), abdominal aortic aneurysm status postrepair, diabetes mellitus, CKD , presented to [facility] 11/1/2023 with complaints of substernal chest discomfort.  cardiology consulted for concerns for ACS.   The patient is being managed inpatient for left leg cellulitis on Zosyn as well as ACS rule out. Troponin 17, repeat 22, ECG did not suggest any acute changes when  compared to prior EKGs, no episodes of chest pain ever since.  Chest x-ray was normal.    CARON score 2  Heart score 4, MACE  12 to 16.6%      #Atypical chest pain  #CAD status post CABG (2003)  -Continue telemetry monitoring  -Monitor for recurrence of chest pain or similar symptoms  -In the setting of resolved chest pain, atypical in nature, no acute change in troponins, no acute change in ECG findings STEMI/NSTEMI seem to be unlikely.  Will monitor for now.  -In the event there is recurrence of chest pain will consider medical antianginal therapies.  Defer pharmacological stress test for now.    #History of bradycardia  #Atrial fibrillation on Xarelto  -Possible sick sinus syndrome  -Patient currently not on beta-blocker secondary to history of episodes of symptomatic bradycardia  -Outpatient discussions suggest that patient had been deferring pacemaker option.  Pacemaker option was readdressed today, and patient is now willing to receive the placement if indicated  -Consulted Electrophysiology, appreciate recommendations  -Measure electrolytes, keep potassium above 4 and magnesium above 2  -Continue Xarelto    #Chronic systolic heart failure (ejection fraction 10/2340 to 45%, 3/2340 to 45%)  Patient is on Bumex as needed at home  Continue Vasotec as per home regime  Continue Aldactone as per home regime  Patient not on beta-blocker secondary to history of episodes of symptomatic bradycardia    #Hyperlipidemia   Continue pravastatin    Assessment and plan discussed with attending physician,    -Aida Dumont MD PGY-3    This note and the above recommendations are not finalized until signed by the attending physician.

## 2023-11-02 NOTE — CARE PLAN
Problem: Fall/Injury  Goal: Not fall by end of shift  Outcome: Progressing  Goal: Be free from injury by end of the shift  Outcome: Progressing  Goal: Verbalize understanding of personal risk factors for fall in the hospital  Outcome: Progressing  Goal: Verbalize understanding of risk factor reduction measures to prevent injury from fall in the home  Outcome: Progressing  Goal: Use assistive devices by end of the shift  Outcome: Progressing  Goal: Pace activities to prevent fatigue by end of the shift  Outcome: Progressing     Problem: Psychosocial Needs  Goal: Demonstrates ability to cope with hospitalization/illness  Outcome: Progressing  Goal: Collaborate with me, my family, and caregiver to identify my specific goals  Outcome: Progressing     Problem: Pain  Goal: Takes deep breaths with improved pain control throughout the shift  Outcome: Progressing  Goal: Turns in bed with improved pain control throughout the shift  Outcome: Progressing  Goal: Walks with improved pain control throughout the shift  Outcome: Progressing  Goal: Performs ADL's with improved pain control throughout shift  Outcome: Progressing  Goal: Participates in PT with improved pain control throughout the shift  Outcome: Progressing  Goal: Free from opioid side effects throughout the shift  Outcome: Progressing  Goal: Free from acute confusion related to pain meds throughout the shift  Outcome: Progressing   The patient's goals for the shift include no further chest pain    The clinical goals for the shift include less chest pain

## 2023-11-03 VITALS
HEIGHT: 73 IN | BODY MASS INDEX: 23.19 KG/M2 | WEIGHT: 175 LBS | SYSTOLIC BLOOD PRESSURE: 163 MMHG | HEART RATE: 63 BPM | TEMPERATURE: 97.2 F | RESPIRATION RATE: 18 BRPM | OXYGEN SATURATION: 96 % | DIASTOLIC BLOOD PRESSURE: 69 MMHG

## 2023-11-03 PROBLEM — R07.89 OTHER CHEST PAIN: Status: RESOLVED | Noted: 2023-11-01 | Resolved: 2023-11-03

## 2023-11-03 PROBLEM — R00.1 BRADYCARDIA: Status: RESOLVED | Noted: 2023-11-01 | Resolved: 2023-11-03

## 2023-11-03 PROBLEM — R07.9 CHEST PAIN: Status: RESOLVED | Noted: 2023-11-02 | Resolved: 2023-11-03

## 2023-11-03 PROBLEM — L03.90 CELLULITIS: Status: RESOLVED | Noted: 2023-11-02 | Resolved: 2023-11-03

## 2023-11-03 LAB
ANION GAP SERPL CALC-SCNC: 12 MMOL/L (ref 10–20)
AORTIC VALVE MEAN GRADIENT: 6
AORTIC VALVE PEAK VELOCITY: 1.58
AV PEAK GRADIENT: 10
AVA (PEAK VEL): 2.12
AVA (VTI): 2.1
BUN SERPL-MCNC: 25 MG/DL (ref 6–23)
CALCIUM SERPL-MCNC: 9.1 MG/DL (ref 8.6–10.3)
CHLORIDE SERPL-SCNC: 109 MMOL/L (ref 98–107)
CO2 SERPL-SCNC: 23 MMOL/L (ref 21–32)
CREAT SERPL-MCNC: 1.46 MG/DL (ref 0.5–1.3)
EJECTION FRACTION APICAL 4 CHAMBER: 54.2
EJECTION FRACTION: 49
ERYTHROCYTE [DISTWIDTH] IN BLOOD BY AUTOMATED COUNT: 14.2 % (ref 11.5–14.5)
GFR SERPL CREATININE-BSD FRML MDRD: 45 ML/MIN/1.73M*2
GLUCOSE SERPL-MCNC: 151 MG/DL (ref 74–99)
HCT VFR BLD AUTO: 38.8 % (ref 41–52)
HGB BLD-MCNC: 12.6 G/DL (ref 13.5–17.5)
LEFT VENTRICLE INTERNAL DIMENSION DIASTOLE: 5.99 (ref 3.5–6)
LEFT VENTRICULAR OUTFLOW TRACT DIAMETER: 2.3
MAGNESIUM SERPL-MCNC: 1.58 MG/DL (ref 1.6–2.4)
MCH RBC QN AUTO: 31 PG (ref 26–34)
MCHC RBC AUTO-ENTMCNC: 32.5 G/DL (ref 32–36)
MCV RBC AUTO: 96 FL (ref 80–100)
MITRAL VALVE E/A RATIO: 1.46
MITRAL VALVE E/E' RATIO: 6.67
NRBC BLD-RTO: 0 /100 WBCS (ref 0–0)
PLATELET # BLD AUTO: 183 X10*3/UL (ref 150–450)
POTASSIUM SERPL-SCNC: 4.4 MMOL/L (ref 3.5–5.3)
RBC # BLD AUTO: 4.06 X10*6/UL (ref 4.5–5.9)
RIGHT VENTRICLE PEAK SYSTOLIC PRESSURE: 25.7
SODIUM SERPL-SCNC: 140 MMOL/L (ref 136–145)
TRICUSPID ANNULAR PLANE SYSTOLIC EXCURSION: 1.7
WBC # BLD AUTO: 6.6 X10*3/UL (ref 4.4–11.3)

## 2023-11-03 PROCEDURE — 80048 BASIC METABOLIC PNL TOTAL CA: CPT | Performed by: NURSE PRACTITIONER

## 2023-11-03 PROCEDURE — 99239 HOSP IP/OBS DSCHRG MGMT >30: CPT | Performed by: NURSE PRACTITIONER

## 2023-11-03 PROCEDURE — 2500000004 HC RX 250 GENERAL PHARMACY W/ HCPCS (ALT 636 FOR OP/ED): Performed by: STUDENT IN AN ORGANIZED HEALTH CARE EDUCATION/TRAINING PROGRAM

## 2023-11-03 PROCEDURE — 36415 COLL VENOUS BLD VENIPUNCTURE: CPT | Performed by: NURSE PRACTITIONER

## 2023-11-03 PROCEDURE — 83735 ASSAY OF MAGNESIUM: CPT | Performed by: NURSE PRACTITIONER

## 2023-11-03 PROCEDURE — 2500000004 HC RX 250 GENERAL PHARMACY W/ HCPCS (ALT 636 FOR OP/ED)

## 2023-11-03 PROCEDURE — 2500000004 HC RX 250 GENERAL PHARMACY W/ HCPCS (ALT 636 FOR OP/ED): Performed by: INTERNAL MEDICINE

## 2023-11-03 PROCEDURE — 99232 SBSQ HOSP IP/OBS MODERATE 35: CPT

## 2023-11-03 PROCEDURE — 85027 COMPLETE CBC AUTOMATED: CPT | Performed by: NURSE PRACTITIONER

## 2023-11-03 RX ORDER — LANOLIN ALCOHOL/MO/W.PET/CERES
400 CREAM (GRAM) TOPICAL ONCE
Status: COMPLETED | OUTPATIENT
Start: 2023-11-03 | End: 2023-11-03

## 2023-11-03 RX ORDER — LANOLIN ALCOHOL/MO/W.PET/CERES
400 CREAM (GRAM) TOPICAL DAILY
Status: DISCONTINUED | OUTPATIENT
Start: 2023-11-03 | End: 2023-11-03

## 2023-11-03 RX ORDER — ENALAPRIL MALEATE 5 MG/1
5 TABLET ORAL NIGHTLY
Qty: 30 TABLET | Refills: 0 | Status: SHIPPED | OUTPATIENT
Start: 2023-11-03 | End: 2023-11-21 | Stop reason: SDUPTHER

## 2023-11-03 RX ADMIN — MAGNESIUM OXIDE TAB 400 MG (241.3 MG ELEMENTAL MG) 400 MG: 400 (241.3 MG) TAB at 09:30

## 2023-11-03 RX ADMIN — SPIRONOLACTONE 25 MG: 25 TABLET, COATED ORAL at 09:23

## 2023-11-03 RX ADMIN — Medication 3 G: at 02:03

## 2023-11-03 RX ADMIN — Medication 400 MG: at 09:23

## 2023-11-03 RX ADMIN — PANTOPRAZOLE SODIUM 40 MG: 40 TABLET, DELAYED RELEASE ORAL at 06:06

## 2023-11-03 RX ADMIN — Medication 400 MG: at 10:38

## 2023-11-03 RX ADMIN — Medication 3 G: at 10:39

## 2023-11-03 ASSESSMENT — COGNITIVE AND FUNCTIONAL STATUS - GENERAL
MOBILITY SCORE: 24
DAILY ACTIVITIY SCORE: 24

## 2023-11-03 ASSESSMENT — PAIN SCALES - GENERAL: PAINLEVEL_OUTOF10: 0 - NO PAIN

## 2023-11-03 NOTE — CARE PLAN
The patient's goals for the shift include no further chest pain    The clinical goals for the shift include completed    Over the shift, the patient did not make progress toward the following goals. Barriers to progression include . Recommendations to address these barriers include .

## 2023-11-03 NOTE — DISCHARGE SUMMARY
Discharge Diagnosis  Other chest pain    Issues Requiring Follow-Up  Follow up with PCP within 1 week of discharge  Follow up with Dr. Jara within 2 weeks of discharge  Follow up with Dr. Gibbons, EP-within 1 week of discharge    Discharge Meds     Your medication list        ASK your doctor about these medications        Instructions Last Dose Given Next Dose Due   ascorbic acid 1,000 mg tablet  Commonly known as: Vitamin C           azaTHIOprine 50 mg tablet  Commonly known as: Imuran           azaTHIOprine 50 mg tablet  Commonly known as: Imuran           cephalexin 500 mg capsule  Commonly known as: Keflex      Take 1 capsule (500 mg) by mouth 2 times a day for 7 days.       cholecalciferol 50 MCG (2000 UT) tablet  Commonly known as: Vitamin D-3           enalapril 10 mg tablet  Commonly known as: Vasotec           magnesium oxide 400 mg (241.3 mg magnesium) tablet  Commonly known as: Mag-Ox  Ask about: Which instructions should I use?           pravastatin 20 mg tablet  Commonly known as: Pravachol           predniSONE 1 mg tablet  Commonly known as: Deltasone           rivaroxaban 15 mg tablet  Commonly known as: Xarelto           spironolactone 25 mg tablet  Commonly known as: Aldactone      Take 1 tablet (25 mg) by mouth once daily.       tamsulosin 0.4 mg 24 hr capsule  Commonly known as: Flomax           vitamin E 180 mg (400 unit) capsule                    Test Results Pending At Discharge  Pending Labs       No current pending labs.            Hospital Course   Hugo Weaver is a 89 y.o. male presenting with chief complaint of midsternal chest discomfort with no reports of radiation to the neck or left upper extremity.  Patient does endorse some right shoulder pain which seems to be acute onset nature however unclear if it is associated with his chest discomfort.  Patient states his pain at the time of arrival was 6 out of 10.  No associated diaphoresis, nausea, vomiting.  States that he follows  "with Dr. Jara in the outpatient setting.  At the time of arrival, noted to be bradycardic with bigeminy and PVCs.  Denies any history of lightheadedness or dizziness attributed to his dysrhythmia and patient denies any syncopal episodes.  Patient's daughter who is present at bedside confirms this.  She states he is not somebody that \"falls all the time\".  Patient states that in addition to the chest discomfort, patient states he is concerned that his left lower extremity cellulitis seems to be worsening after initially improving on Keflex.     On arrival, cardiac work-up initiated with first troponin of 17 with follow-up noted to be 22.  Patient noted to have elevation in his BNP, 468.  Last echocardiogram obtained in March of this year shows an EF of approximately 40 to 45%.  WBC within normal limits.  Chest x-ray obtained negative for acute cardiopulmonary process.  Continues to be bradycardic on telemetry but asymptomatic.  Admitted for further management.    Hospital course:  Chest pain resolved. Blood pressures controlled 130-140 systolic. EP consulted for intermittent bradycardia. Remained on room air, no shortness of breath.   Placed on Unasyn for lower extremity cellulitis. US duplex obtained and negative for acute DTV bilaterally. Redness has improved, follow up with PCP. Echo obtained.  Seen by Cardiology. Recommend outpatient follow up with EP Dr. Gibbons. He has been ambulating through his room, will dc to home.    Pertinent Physical Exam At Time of Discharge  Physical Exam  Constitutional:       Appearance: Normal appearance.   HENT:      Head: Normocephalic and atraumatic.      Nose: Nose normal.      Mouth/Throat:      Mouth: Mucous membranes are moist.   Eyes:      Extraocular Movements: Extraocular movements intact.      Conjunctiva/sclera: Conjunctivae normal.      Pupils: Pupils are equal, round, and reactive to light.   Cardiovascular:      Rate and Rhythm: IRR     Pulses: Normal pulses.      " Heart sounds: Normal heart sounds.   Pulmonary:      Effort: Pulmonary effort is normal.      Breath sounds: Normal breath sounds.   Abdominal:      General: Bowel sounds are normal.      Palpations: Abdomen is soft.   Musculoskeletal:         General: Normal range of motion.      Cervical back: Normal range of motion and neck supple.   Skin:     Comments: Erythema involving the distal left lower extremity has improved.  Not clearly demarcated.  There is minimal warmth to palpation.  There are no open wounds, lesions, or other abnormal areas of skin breakdown.  No drainage or discharge   Neurological:      General: No focal deficit present.      Mental Status: He is alert. Mental status is at baseline.   Psychiatric:         Mood and Affect: Mood normal.         Behavior: Behavior normal.   Outpatient Follow-Up  Future Appointments   Date Time Provider Department Center   11/13/2023  1:00 PM Mehrdad Barton DO [id number] [place]   11/28/2023  9:20 AM Guanakito Kang MD PhD [id number] [facility]   5/14/2024 11:15 AM Jef Jara MD [id number] [place]       Clara Manzano CNP  [facility]  [address] [address]  Phone: [phone] Fax: [phone]  VEL Leslie-ELIDA

## 2023-11-03 NOTE — PROGRESS NOTES
"Hugo Weaver is a 89 y.o. male on day 1 of admission presenting with Other chest pain.    Subjective   Patient was seen and examined this morning.  She reported no episodes of chest pain, palpitation, shortness of breath, nausea, vomiting, sweating, lightheadedness.  He feels fine overall.       Overnight events  PVCs noted on telemetry    Objective     Physical Exam    Last Recorded Vitals  Blood pressure 163/69, pulse 63, temperature 36.2 °C (97.2 °F), temperature source Temporal, resp. rate 18, height 1.854 m (6' 1\"), weight 79.4 kg (175 lb), SpO2 96 %.  Intake/Output last 3 Shifts:  I/O last 3 completed shifts:  In: 746 (9.4 mL/kg) [P.O.:480; IV Piggyback:266]  Out: - (0 mL/kg)   Weight: 79.4 kg       Intake/Output Summary (Last 24 hours) at 11/3/2023 0949  Last data filed at 11/3/2023 0900  Gross per 24 hour   Intake 716 ml   Output --   Net 716 ml     Constitutional: Well developed,  well appearing adult in no acute distress.   NEURO: Alert and oriented. Moves all extremities. Face is symmetric and expressive. Sensation is intact over all dermatomal distributions of the right upper extremity. Strength is intact  EYES: PERRL. No scleral icterus or conjunctival injection. No discharge.   HENT: Normocephalic, atraumatic. Hearing is grossly intact. Nares grossly patent and without discharge. Mucous membranes moist.   NECK: No JVD. Patient moves neck without restriction.   CARDIO: Rhythm regular. Normal rate. No murmur, rub, or gallop. Pulses equal bilaterally in the upper and lower extremity.   PULM: Lungs clear to auscultation in all fields. No wheezes, rales, or rhonchi. No conversational dyspnea. No splinting, stridor, or accessory muscle use.    GI/: Abdomen is soft and non-tender. Normoactive bowel sounds.   EXTREMITIES:edema(reduced form prior) and erythema + LLE  SKIN: Warm and dry. Normal turgor. No rash noted over the area of pain  PSYCH: Mood, affect, and interaction is appropriate to the " setting.      Results for orders placed or performed during the hospital encounter of 11/01/23 (from the past 24 hour(s))   Transthoracic Echo (TTE) Complete   Result Value Ref Range    BSA 2.02 m2   CBC   Result Value Ref Range    WBC 6.3 4.4 - 11.3 x10*3/uL    nRBC 0.0 0.0 - 0.0 /100 WBCs    RBC 3.83 (L) 4.50 - 5.90 x10*6/uL    Hemoglobin 12.2 (L) 13.5 - 17.5 g/dL    Hematocrit 37.1 (L) 41.0 - 52.0 %    MCV 97 80 - 100 fL    MCH 31.9 26.0 - 34.0 pg    MCHC 32.9 32.0 - 36.0 g/dL    RDW 14.0 11.5 - 14.5 %    Platelets 181 150 - 450 x10*3/uL   Comprehensive metabolic panel   Result Value Ref Range    Glucose 125 (H) 74 - 99 mg/dL    Sodium 137 136 - 145 mmol/L    Potassium 4.2 3.5 - 5.3 mmol/L    Chloride 107 98 - 107 mmol/L    Bicarbonate 22 21 - 32 mmol/L    Anion Gap 12 10 - 20 mmol/L    Urea Nitrogen 30 (H) 6 - 23 mg/dL    Creatinine 1.27 0.50 - 1.30 mg/dL    eGFR 53 (L) >60 mL/min/1.73m*2    Calcium 8.8 8.6 - 10.3 mg/dL    Albumin 3.6 3.4 - 5.0 g/dL    Alkaline Phosphatase 26 (L) 33 - 136 U/L    Total Protein 5.6 (L) 6.4 - 8.2 g/dL    AST 13 9 - 39 U/L    Bilirubin, Total 0.6 0.0 - 1.2 mg/dL    ALT 9 (L) 10 - 52 U/L   Magnesium   Result Value Ref Range    Magnesium 1.68 1.60 - 2.40 mg/dL   Vascular US Lower Extremity Venous Duplex Bilateral   Result Value Ref Range    BSA 2.02 m2   CBC   Result Value Ref Range    WBC 6.6 4.4 - 11.3 x10*3/uL    nRBC 0.0 0.0 - 0.0 /100 WBCs    RBC 4.06 (L) 4.50 - 5.90 x10*6/uL    Hemoglobin 12.6 (L) 13.5 - 17.5 g/dL    Hematocrit 38.8 (L) 41.0 - 52.0 %    MCV 96 80 - 100 fL    MCH 31.0 26.0 - 34.0 pg    MCHC 32.5 32.0 - 36.0 g/dL    RDW 14.2 11.5 - 14.5 %    Platelets 183 150 - 450 x10*3/uL   Basic Metabolic Panel   Result Value Ref Range    Glucose 151 (H) 74 - 99 mg/dL    Sodium 140 136 - 145 mmol/L    Potassium 4.4 3.5 - 5.3 mmol/L    Chloride 109 (H) 98 - 107 mmol/L    Bicarbonate 23 21 - 32 mmol/L    Anion Gap 12 10 - 20 mmol/L    Urea Nitrogen 25 (H) 6 - 23 mg/dL     Creatinine 1.46 (H) 0.50 - 1.30 mg/dL    eGFR 45 (L) >60 mL/min/1.73m*2    Calcium 9.1 8.6 - 10.3 mg/dL   Magnesium   Result Value Ref Range    Magnesium 1.58 (L) 1.60 - 2.40 mg/dL        Vascular US Lower Extremity Venous Duplex Bilateral     Result Date: 11/2/2023  Preliminary Cardiology Report            [facility] [address] [place] [address]     Tel [phone] Fax [phone]          Preliminary Vascular Lab Report  [facility] US LOWER EXTREMITY VENOUS DUPLEX BILATERAL  Patient Name:     VIPUL CHRISTIAN Reading Physician:  [id number] Geovany Petit DO Study Date:       11/2/2023       Ordering Provider:  [id number] CHRISTIE BENZ MRN/PID:          [id number]        Fellow: Accession#:       [id number]    Technologist:       Pema Nolasco RVT, RDMS YOB: 1931        Technologist 2: Gender:           M               Encounter#:         [id number] Admission Status: Inpatient       Location Performed: [facility]  Diagnosis/ICD: Pain in left leg-M79.605; Other specified soft tissue                disorders-M79.89 Indication:    Limb swelling CPT Codes:     88287 Peripheral venous duplex scan for DVT complete  Pertinent History: Previous DVT, Anticoagulation and CAD. CHF, Afib                    History of chronic DVT in the right PTV on 3/7/23.  PRELIMINARY CONCLUSIONS:  Right Lower Venous: No evidence of acute deep vein thrombus visualized in the right lower extremity. Left Lower Venous: No evidence of acute deep vein thrombus visualized in the left lower extremity.  Imaging & Doppler Findings:  Right                 Compressible Thrombus        Flow Distal External Iliac                None CFV                       Yes        None   Spontaneous/Phasic PFV                       Yes        None FV Proximal               Yes        None   Spontaneous/Phasic FV Mid                    Yes        None FV Distal                 Yes        None Popliteal                 Yes         None   Spontaneous/Phasic Peroneal                  Yes        None PTV                       Yes        None  Left                  Compress Thrombus        Flow Distal External Iliac            None CFV                     Yes      None   Spontaneous/Phasic PFV                     Yes      None FV Proximal             Yes      None   Spontaneous/Phasic FV Mid                  Yes      None FV Distal               Yes      None Popliteal               Yes      None   Spontaneous/Phasic Peroneal                Yes      None PTV                     Yes      None VASCULAR PRELIMINARY REPORT completed by Pema Nolasco RVT on 11/2/2023 at 11:42:38 AM  ** Final **      XR chest 1 view     Result Date: 11/2/2023  Interpreted By:  Christopher Waters, STUDY: XR CHEST 1 VIEW;  11/2/2023 12:40 am   INDICATION: Signs/Symptoms:Chest Pain.   COMPARISON: Chest radiograph dated 03/06/2022.   ACCESSION NUMBER(S): [id number]   ORDERING CLINICIAN: ANGY MUNSON   FINDINGS:   CARDIOMEDIASTINAL SILHOUETTE: Cardiomediastinal silhouette is stable in size and configuration. Sternotomy wires are unchanged.   LUNGS/PLEURA: There are unchanged right infrahilar calcified lymph nodes and/or granulomas. There are no consolidations.There are no pleural effusions. There is no demonstrated pneumothorax.     BONES: No evidence of acute osseous abnormality.        1.  No evidence of acute cardiopulmonary process.     Signed by: Christopher Waters 11/2/2023 12:55 AM Dictation workstation:   [id number]     ECG 12 lead     Result Date: 11/1/2023  (AFIB)(AFL) PVC's Left axis deviation Left bundle branch block Abnormal ECG When compared with ECG of 27-OCT-2023 13:25, (unconfirmed) Current undetermined rhythm precludes rhythm comparison, needs review Confirmed by DONALD Shin ([id number]) on 11/1/2023 6:32:16 PM     ECG 12 lead     Result Date: 11/1/2023  (AFIB)(AFL) PVC's Left axis deviation Left bundle branch block Abnormal ECG When compared with ECG of  06-MAR-2023 13:27, Current undetermined rhythm precludes rhythm comparison, needs review Left bundle branch block has replaced Nonspecific intraventricular block Confirmed by DONALD Shin ([id number]) on 11/1/2023 6:31:40 PM       ECHO 11/2/23     1. Left ventricular systolic function is mildly decreased with a 40-45% estimated ejection fraction.  2. Abnormal septal motion consistent with left bundle branch block.  3. There is mildly reduced right ventricular systolic function.  4. The left atrium is severely dilated.  5. The right atrium is moderately dilated.  6. RVSP within normal limits.  7. Moderate aortic valve regurgitation.  8. The patient is in atrial fibrillation which may influence the estimate of left ventricular function and transvalvular flows.  9. There is global hypokinesis of the left ventricle with minor regional variations.    Scheduled medications  ampicillin-sulbactam, 3 g, intravenous, q6h  enalapril, 5 mg, oral, Nightly  magnesium oxide, 400 mg, oral, Daily  magnesium oxide, 400 mg, oral, Daily  pantoprazole, 40 mg, oral, Daily before breakfast   Or  pantoprazole, 40 mg, intravenous, Daily before breakfast  perflutren lipid microspheres, 0.5-10 mL of dilution, intravenous, Once in imaging  perflutren protein A microsphere, 0.5 mL, intravenous, Once in imaging  pravastatin, 20 mg, oral, Nightly  rivaroxaban, 15 mg, oral, Daily with evening meal  spironolactone, 25 mg, oral, Daily  sulfur hexafluoride microsphr, 2 mL, intravenous, Once in imaging      Continuous medications     PRN medications  PRN medications: acetaminophen **OR** acetaminophen **OR** acetaminophen, acetaminophen **OR** acetaminophen **OR** acetaminophen, melatonin, metoclopramide **OR** metoclopramide, ondansetron ODT **OR** ondansetron, polyethylene glycol           Assessment/Plan   Active Problems:    Atrial fibrillation (CMS/HCC)    Chronic kidney disease, stage III (moderate) (CMS/HCC)    CHF (congestive heart failure)  (CMS/[facility])    89-year-old male with a past medical history of LBBB, symptomatic bradycardia, hyperlipidemia coronary artery disease status post CABG (2003 LIMA to LAD, VG to OM and VG to right PDA), peripheral neuropathy, atrial fibrillation on Xarelto, chronic systolic heart failure (3/23 EF 40 to 45%), abdominal aortic aneurysm status postrepair, diabetes mellitus, CKD , presented to [facility]/1/2023 with complaints of substernal chest discomfort.  cardiology consulted for concerns for ACS.   The patient is being managed inpatient for left leg cellulitis on Zosyn as well as ACS rule out. Troponin 17, repeat 22, ECG did not suggest any acute changes when compared to prior EKGs, no episodes of chest pain ever since.  Chest x-ray was normal. No further chest pain reported.     CARON score 2  Heart score 4, MACE  12 to 16.6%        #Atypical chest pain  #CAD status post CABG (2003)  -Continue telemetry monitoring  -Monitor for recurrence of chest pain or similar symptoms  -In the setting of resolved chest pain, atypical in nature, no acute change in troponins, no acute change in ECG findings STEMI/NSTEMI seem to be unlikely.    -No recurrent chest pain     #History of bradycardia  #Atrial fibrillation on Xarelto  -Possible sick sinus syndrome  -Patient currently not on beta-blocker secondary to history of episodes of symptomatic bradycardia  -Outpatient discussions suggest that patient had been deferring pacemaker option.  Pacemaker option was readdressed today, and patient is now willing to receive the placement if indicated  -Outpatient followup with Dr Gibbons for Electrophysiology(referral in the discharge papers)  -Continue Xarelto     #Chronic systolic heart failure (ejection fraction 10/2340 to 45%, 3/2340 to 45%)  Patient is on Bumex as needed at home  Continue Vasotec as per home regime  Continue Aldactone as per home regime  Patient not on beta-blocker secondary to history of episodes of symptomatic bradycardia      #Hyperlipidemia   Continue pravastatin       Patient is ok for discharge today from a cardiology standpoint.  Outpatient follow-up with Cardiology-Dr. Jara and Electrophysiology-Dr. Gibbons.  Continue current medications.    No further recommendations.  Please call if additional concerns arise.     Assessment and plan discussed with attending physician,     -Aida Dumont MD PGY-3     This note and the above recommendations are not finalized until signed by the attending physician.

## 2023-11-03 NOTE — DISCHARGE INSTRUCTIONS
Follow up with PCP within 1 week of discharge  Follow up with cardiology within 2 weeks of discharge-call for appt  Follow up with EP Dr. Gibbons in 1 week of discharge

## 2023-11-06 ENCOUNTER — TELEPHONE (OUTPATIENT)
Dept: CARDIOLOGY | Facility: CLINIC | Age: 88
End: 2023-11-06
Payer: MEDICARE

## 2023-11-06 ENCOUNTER — PATIENT OUTREACH (OUTPATIENT)
Dept: PRIMARY CARE | Facility: CLINIC | Age: 88
End: 2023-11-06
Payer: MEDICARE

## 2023-11-06 ENCOUNTER — DOCUMENTATION (OUTPATIENT)
Dept: PRIMARY CARE | Facility: CLINIC | Age: 88
End: 2023-11-06
Payer: MEDICARE

## 2023-11-06 NOTE — TELEPHONE ENCOUNTER
Hugo called to schedule a hospital follow up. I offered an appt for this week but he wants to see you after he see EP which is next Wednesday. I offered Swathi but he declined. Please advise on when you'd like to see him.

## 2023-11-06 NOTE — PROGRESS NOTES
Discharge Facility: [facility]  Discharge Diagnosis: chest pain  Admission Date: 11/1/2023  Discharge Date: 11/3/2023    PCP Appointment Date: 11/13/2023  Specialist Appointment Date:  none    Follow up with Dr. Jara within 2 weeks of discharge  Follow up with ASHVIN Mccarthy-within 1 week of discharge      Hospital Encounter and Summary: Linked   See discharge assessment below for further details    Engagement  Call Start Time: 0937 (11/6/2023  9:37 AM)    Medications  Medications reviewed with patient/caregiver?: Yes (11/6/2023  9:37 AM)  Is the patient having any side effects they believe may be caused by any medication additions or changes?: No (11/6/2023  9:37 AM)  Does the patient have all medications ordered at discharge?: Yes (11/6/2023  9:37 AM)  Care Management Interventions: No intervention needed (11/6/2023  9:37 AM)  Prescription Comments: no change in meds or no new meds (11/6/2023  9:37 AM)  Is the patient taking all medications as directed (includes completed medication regime)?: Yes (11/6/2023  9:37 AM)  Medication Comments: see med list (11/6/2023  9:37 AM)    Appointments  Does the patient have a primary care provider?: Yes (11/6/2023  9:37 AM)  Care Management Interventions: Verified appointment date/time/provider (11/6/2023  9:37 AM)  Has the patient kept scheduled appointments due by today?: Yes (11/6/2023  9:37 AM)  Care Management Interventions: Advised patient to keep appointment (11/6/2023  9:37 AM)    Self Management  What is the home health agency?: none (11/6/2023  9:37 AM)  Has home health visited the patient within 72 hours of discharge?: Not applicable (11/6/2023  9:37 AM)    Patient Teaching  Does the patient have access to their discharge instructions?: Yes (11/6/2023  9:37 AM)  Care Management Interventions: Reviewed instructions with patient (11/6/2023  9:37 AM)  What is the patient's perception of their health status since discharge?: Improving (11/6/2023  9:37 AM)  Is the  patient/caregiver able to teach back the hierarchy of who to call/visit for symptoms/problems? PCP, Specialist, Home Health nurse, Urgent Care, ED, 911: Yes (11/6/2023  9:37 AM)    Wrap Up  Wrap Up Additional Comments: CTS spoke with patient. He stated that he is doing well. denies chest pain or shortness of breath. patient stated that his legs were looking much better this morning. has a follow up schuled with PCP. advised patient to keep that appt. saritha voiced no questions or concerns at this time. (11/6/2023  9:37 AM)  Call End Time: 0940 (11/6/2023  9:37 AM)

## 2023-11-13 ENCOUNTER — OFFICE VISIT (OUTPATIENT)
Dept: PRIMARY CARE | Facility: CLINIC | Age: 88
End: 2023-11-13
Payer: MEDICARE

## 2023-11-13 VITALS
TEMPERATURE: 97.8 F | SYSTOLIC BLOOD PRESSURE: 150 MMHG | OXYGEN SATURATION: 97 % | WEIGHT: 164 LBS | DIASTOLIC BLOOD PRESSURE: 64 MMHG | BODY MASS INDEX: 21.64 KG/M2 | HEART RATE: 60 BPM

## 2023-11-13 DIAGNOSIS — E55.9 VITAMIN D DEFICIENCY: ICD-10-CM

## 2023-11-13 DIAGNOSIS — I25.709 ATHEROSCLEROSIS OF CORONARY ARTERY BYPASS GRAFT OF NATIVE HEART WITH ANGINA PECTORIS (CMS-HCC): ICD-10-CM

## 2023-11-13 DIAGNOSIS — Z00.00 ROUTINE GENERAL MEDICAL EXAMINATION AT HEALTH CARE FACILITY: ICD-10-CM

## 2023-11-13 DIAGNOSIS — Z00.00 ROUTINE HEALTH MAINTENANCE: Primary | ICD-10-CM

## 2023-11-13 DIAGNOSIS — E11.9 TYPE 2 DIABETES MELLITUS WITHOUT COMPLICATION, WITHOUT LONG-TERM CURRENT USE OF INSULIN (MULTI): ICD-10-CM

## 2023-11-13 DIAGNOSIS — E78.2 COMBINED HYPERLIPIDEMIA: ICD-10-CM

## 2023-11-13 PROCEDURE — G0439 PPPS, SUBSEQ VISIT: HCPCS | Performed by: FAMILY MEDICINE

## 2023-11-13 PROCEDURE — G0008 ADMIN INFLUENZA VIRUS VAC: HCPCS | Performed by: FAMILY MEDICINE

## 2023-11-13 PROCEDURE — 1159F MED LIST DOCD IN RCRD: CPT | Performed by: FAMILY MEDICINE

## 2023-11-13 PROCEDURE — 3078F DIAST BP <80 MM HG: CPT | Performed by: FAMILY MEDICINE

## 2023-11-13 PROCEDURE — 1160F RVW MEDS BY RX/DR IN RCRD: CPT | Performed by: FAMILY MEDICINE

## 2023-11-13 PROCEDURE — 1170F FXNL STATUS ASSESSED: CPT | Performed by: FAMILY MEDICINE

## 2023-11-13 PROCEDURE — 1126F AMNT PAIN NOTED NONE PRSNT: CPT | Performed by: FAMILY MEDICINE

## 2023-11-13 PROCEDURE — 1036F TOBACCO NON-USER: CPT | Performed by: FAMILY MEDICINE

## 2023-11-13 PROCEDURE — 3077F SYST BP >= 140 MM HG: CPT | Performed by: FAMILY MEDICINE

## 2023-11-13 PROCEDURE — 90662 IIV NO PRSV INCREASED AG IM: CPT | Performed by: FAMILY MEDICINE

## 2023-11-13 PROCEDURE — 1111F DSCHRG MED/CURRENT MED MERGE: CPT | Performed by: FAMILY MEDICINE

## 2023-11-13 PROCEDURE — 99495 TRANSJ CARE MGMT MOD F2F 14D: CPT | Performed by: FAMILY MEDICINE

## 2023-11-13 RX ORDER — GLUCOSAM/CHONDRO/HERB 149/HYAL 750-100 MG
1 TABLET ORAL DAILY
COMMUNITY

## 2023-11-13 ASSESSMENT — ENCOUNTER SYMPTOMS
COUGH: 0
CONSTIPATION: 1
DIZZINESS: 1
PALPITATIONS: 1
ABDOMINAL PAIN: 0
FEVER: 0
NUMBNESS: 1
ARTHRALGIAS: 1

## 2023-11-13 ASSESSMENT — PATIENT HEALTH QUESTIONNAIRE - PHQ9
1. LITTLE INTEREST OR PLEASURE IN DOING THINGS: NOT AT ALL
2. FEELING DOWN, DEPRESSED OR HOPELESS: NOT AT ALL
SUM OF ALL RESPONSES TO PHQ9 QUESTIONS 1 AND 2: 0

## 2023-11-13 ASSESSMENT — ACTIVITIES OF DAILY LIVING (ADL)
MANAGING_FINANCES: INDEPENDENT
GROCERY_SHOPPING: INDEPENDENT
BATHING: INDEPENDENT
TAKING_MEDICATION: INDEPENDENT
DOING_HOUSEWORK: INDEPENDENT
DRESSING: INDEPENDENT

## 2023-11-13 NOTE — PROGRESS NOTES
Subjective   Patient ID: Hugo Weaver is a 89 y.o. male who presents for Annual Exam.    Rash  This is a new problem. Pertinent negatives include no cough or fever.        He is here today for annual Medicare physical, annual follow-up, and also follow-up on recent hospitalization for cellulitis    He was initially seen in the ED on 10/27 with redness and swelling involving his left leg which has been present for several days.  He was diagnosed with cellulitis and discharged with antibiotics  He was then hospitalized from 11/1 through 11/3/2023 with chest pain.  He states that on the day of hospitalization, he developed chills, lightheaded, felt weak and had chest pain.  He went to the ED and was subsequently admitted  I reviewed his records from hospitalization.  He had a negative troponin.  BNP was elevated at 468.  Echocardiogram done 11/2 showed ejection fraction of 45 to 50%.  Chest x-ray was negative for any acute cardiopulmonary process.  He was found to be bradycardic on telemetry but was asymptomatic  He did see cardiology during his stay.  Enalapril dose was decreased from 10 to 5 mg, otherwise no changes were made in his medications.  Chest pain resolved during his stay, and he is scheduled to follow-up with both cardiology and electrophysiology later this month  He was treated with IV antibiotics for left leg cellulitis.  Ultrasound done was negative for DVT bilaterally.  Redness improved during his stay  He is overall doing better since discharge.  Redness and swelling of his left leg have continued to improve.  No fever.  He has not had any dizziness over the past few days.        Otherwise no major concerns  He has been staying active.  He walks as much as he can and a  has been coming to his home 2 times per week.  He follows with cardiology due to history of chronic atrial fibrillation, CAD with history of CABG in past, history of left bundle branch block, and chronic systolic heart  failure.  He is currently taking spironolactone, enalapril, Xarelto, and bumetanide 2 mg as needed  He has a history of chronic neuropathy in both legs.  Previously saw neurology and it was felt that this was secondary to his history of diabetes mellitus  Last A1c done in May of this year was 7.0%.  He is not currently taking any medications for diabetes mellitus.  This was diagnosed several years ago when he was taking prednisone  Takes pravastatin 20 mg daily for hyperlipidemia.  Last lipid panel done January 2023 showed LDL of 82  He is following with a specialist for pemphigus.  Currently taking prednisone 1 mg daily and also azathioprine  Vaccinations: Received Tdap vaccine in 2020.  Prevnar 22,023.  He has received the Shingrix vaccine.  He has not yet received a flu vaccine this year  No falls.  He has been using a cane and rollator to help with ambulation          Review of Systems   Constitutional:  Negative for fever.   Respiratory:  Negative for cough.    Cardiovascular:  Positive for palpitations and leg swelling. Negative for chest pain (Resolved following recent hospitalization).   Gastrointestinal:  Positive for constipation. Negative for abdominal pain.   Musculoskeletal:  Positive for arthralgias (Left knee).   Skin:  Positive for rash.   Neurological:  Positive for dizziness (When hospitalized.  None over the past few days) and numbness.   All other systems reviewed and are negative.      Objective   /64   Pulse 60   Temp 36.6 °C (97.8 °F) (Temporal)   Wt 74.4 kg (164 lb)   SpO2 97%   BMI 21.64 kg/m²     Physical Exam  Vitals reviewed.   Constitutional:       General: He is not in acute distress.     Appearance: Normal appearance. He is well-developed.   HENT:      Head: Normocephalic.      Right Ear: Tympanic membrane, ear canal and external ear normal.      Left Ear: Tympanic membrane, ear canal and external ear normal.      Ears:      Comments: Excess cerumen in both ear canals      Nose: Nose normal.      Mouth/Throat:      Mouth: Mucous membranes are moist.   Eyes:      Conjunctiva/sclera: Conjunctivae normal.   Neck:      Thyroid: No thyromegaly.      Vascular: No JVD.   Cardiovascular:      Rate and Rhythm: Normal rate.      Heart sounds: Normal heart sounds.      Comments: Irregularly irregular  Pulmonary:      Effort: Pulmonary effort is normal.      Breath sounds: Normal breath sounds.   Abdominal:      Palpations: Abdomen is soft.      Tenderness: There is no abdominal tenderness.   Musculoskeletal:         General: Normal range of motion.      Right lower leg: Edema present.      Left lower leg: Edema present.   Lymphadenopathy:      Cervical: No cervical adenopathy.   Skin:     Findings: No rash.      Comments: There is trace to 1+ bilateral leg edema.  Very dull red erythema left anterior shin in the area of recent cellulitis.  There is no warmth or tenderness   Neurological:      Mental Status: He is alert and oriented to person, place, and time.   Psychiatric:         Mood and Affect: Mood normal.         Behavior: Behavior normal.         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  Continue established treatment plan and follow-up at least yearly          Combined hyperlipidemia    Relevant Orders    Lipid Panel    Hemoglobin A1C    Vitamin D 25-Hydroxy,Total (for eval of Vitamin D levels)    Vitamin B12    TSH with reflex to Free T4 if abnormal    Vitamin D deficiency    Relevant Orders    Lipid Panel    Hemoglobin A1C    Vitamin D 25-Hydroxy,Total (for eval of Vitamin D levels)    Vitamin B12    TSH with reflex to Free T4 if abnormal     Other Visit Diagnoses       Routine health maintenance    -  Primary    Relevant Orders    Lipid Panel    Hemoglobin A1C    Vitamin D 25-Hydroxy,Total (for eval of Vitamin D  levels)    Vitamin B12    TSH with reflex to Free T4 if abnormal    Type 2 diabetes mellitus without complication, without long-term current use of insulin (CMS/[facility])        Relevant Orders    Hemoglobin A1C    Routine general medical examination at health care facility            Preventative health:  Up-to-date on tetanus, pneumococcal and Shingrix vaccines.  Given influenza vaccination today.  I did recommend that he received both the COVID and RSV vaccines at the pharmacy  We will check labs including TSH and vitamin B12 due to history of chronic lower extremity neuropathy  Hyperlipidemia has been stable on pravastatin, continue current dose and we will recheck a lipid panel  Diabetes mellitus: His last A1c was 7.0%.  We will recheck an A1c with next labs.  I would be comfortable holding off on medications as long as his A1c remains below 8.0%  Lower extremity cellulitis, left leg: With recent hospitalization.  I reviewed his hospital records as well as transition of care and discharge summaries.  During his stay he was treated with antibiotics with improvement, and on exam today this appears to be resolving.  Ultrasound was negative for DVT.  Follow-up if this recurs  His chest pain has resolved since hospitalization.  He will be following up with both cardiology and electrophysiology within the next several weeks  If otherwise doing well, follow-up in 1 year for next annual Medicare physical

## 2023-11-13 NOTE — ASSESSMENT & PLAN NOTE
Stable based on symptoms and exam.  Continue established treatment plan and follow-up at least yearly

## 2023-11-15 ENCOUNTER — OFFICE VISIT (OUTPATIENT)
Dept: CARDIOLOGY | Facility: CLINIC | Age: 88
End: 2023-11-15
Payer: MEDICARE

## 2023-11-15 VITALS
SYSTOLIC BLOOD PRESSURE: 144 MMHG | TEMPERATURE: 96.9 F | RESPIRATION RATE: 14 BRPM | HEART RATE: 62 BPM | HEIGHT: 73 IN | DIASTOLIC BLOOD PRESSURE: 62 MMHG | OXYGEN SATURATION: 98 % | BODY MASS INDEX: 22.66 KG/M2 | WEIGHT: 171 LBS

## 2023-11-15 DIAGNOSIS — Z86.73 H/O: STROKE: ICD-10-CM

## 2023-11-15 DIAGNOSIS — R00.1 BRADYCARDIA: Primary | ICD-10-CM

## 2023-11-15 DIAGNOSIS — I50.22 CHRONIC SYSTOLIC CONGESTIVE HEART FAILURE (MULTI): Primary | ICD-10-CM

## 2023-11-15 DIAGNOSIS — I48.11 LONGSTANDING PERSISTENT ATRIAL FIBRILLATION (MULTI): ICD-10-CM

## 2023-11-15 PROCEDURE — 1160F RVW MEDS BY RX/DR IN RCRD: CPT | Performed by: INTERNAL MEDICINE

## 2023-11-15 PROCEDURE — 1159F MED LIST DOCD IN RCRD: CPT | Performed by: INTERNAL MEDICINE

## 2023-11-15 PROCEDURE — 3077F SYST BP >= 140 MM HG: CPT | Performed by: INTERNAL MEDICINE

## 2023-11-15 PROCEDURE — 1126F AMNT PAIN NOTED NONE PRSNT: CPT | Performed by: INTERNAL MEDICINE

## 2023-11-15 PROCEDURE — 3078F DIAST BP <80 MM HG: CPT | Performed by: INTERNAL MEDICINE

## 2023-11-15 PROCEDURE — 99215 OFFICE O/P EST HI 40 MIN: CPT | Performed by: INTERNAL MEDICINE

## 2023-11-15 PROCEDURE — 1036F TOBACCO NON-USER: CPT | Performed by: INTERNAL MEDICINE

## 2023-11-15 PROCEDURE — 1111F DSCHRG MED/CURRENT MED MERGE: CPT | Performed by: INTERNAL MEDICINE

## 2023-11-15 PROCEDURE — 99205 OFFICE O/P NEW HI 60 MIN: CPT | Performed by: INTERNAL MEDICINE

## 2023-11-15 NOTE — Clinical Note
Thank you for the opportunity to see your patient. I have attached my consultation note for your review at your convenience. I think CRT-P implantation would be helpful for this patient. He wants to discuss with you first before making a final decision.   Please do not hesitate to contact me, either through Epic or phone/email, if you have any questions.  Sincerely, Andrez Gibbons Cell: [phone] Email: [email or web address]

## 2023-11-15 NOTE — PROGRESS NOTES
Referring Provider: Jef Jara MD  Reason for Consult: Bradycardia    History of Present Illness:      Hugo Weaver is a 89 y.o. year old male patient with a history significant for hypertension, CAD s/p CABG in 2003, permanent atrial fibrillation, ischemic cardiomyopathy mildly reduced ejection fraction 45-50%, AAA s/p repair, diabetes, and CKD  who is referred by Dr. Jara for consideration of pacemaker for symptomatic bradycardia.    He was recently admitted to the hospital on 11/03 with cellulitis. He was noted to be bradycardic during that hospital visit. Prior monitors have shown permanent atrial fibrillation, with some pauses up to 3 seconds long. He has also had symptomatic bradycardia in the past in the setting of receiving beta-blockers as well. He notes some symptoms with bradycardia, including fatigue, and sometimes nausea after eating, which are improved when he gets up and walks around to get his heart rate up.     Focused Cardiovascular Problem List:  Permanent Atrial Fibrillation: [id number] = 6. Anticoagulated on Xarelto with lower dose of 15mg due to kidney function. Rate-controlled/bradycardic off any AV adrian agents.   CAD s/p CABG  Ischemic cardiomyopathy with  mildly reduced ejection fraction (45-50% most recent, previously as low as 35%): Unable to tolerate beta-blockers. On ACE-I and MRA.   Type 2 DM: Currently diet controlled  Hypertension  CKD      Past Medical and Surgical History:  Mr. Weaver  has a past medical history of Atrial fibrillation (CMS/[facility]), Cataract, Dyspnea, unspecified, Heart disease, HL (hearing loss), Hypertension, Other specified postprocedural states, Personal history of other diseases of the circulatory system, Personal history of other diseases of the circulatory system, Personal history of other diseases of the circulatory system, Personal history of other diseases of the digestive system (09/27/2018), Personal history of other endocrine, nutritional and  metabolic disease, Personal history of other endocrine, nutritional and metabolic disease, Personal history of other endocrine, nutritional and metabolic disease, Personal history of other medical treatment, and Personal history of other specified conditions (2015).    has a past surgical history that includes Other surgical history (2014); Other surgical history (2018); Other surgical history (2019); Other surgical history (2019); CT angio abdomen pelvis w and or wo IV IV contrast (2020); Coronary artery bypass graft (10/2005); and Cholecystectomy (10/2018).    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  Other: Wife passed away from Parkinson's in     Relevant Family History:   Family History   Problem Relation Name Age of Onset    Diabetes Mother Clemencia     Other (Cardiac disorder) Mother Clemencia     Heart disease Mother Clemencia     Hypertension Mother Clemencia     Arthritis Mother Clemencia     No Known Problems Father       Family History of Sudden Death: YesFather  of AAA rupture       Allergies:  Allergies   Allergen Reactions    Atorvastatin Unknown    Simvastatin Unknown        Medications:  Current Outpatient Medications   Medication Instructions    ascorbic acid (Vitamin C) 1,000 mg tablet 1 tablet, oral, Daily    azaTHIOprine (IMURAN) 50 mg, oral, Every morning    enalapril (VASOTEC) 5 mg, oral, Nightly    magnesium oxide (MAG-OX) 400 mg, oral, Daily    omega 3-dha-epa-fish oil (Fish OiL) 1,000 mg (120 mg-180 mg) capsule oral    pravastatin (Pravachol) 20 mg tablet 1 tablet, oral, Nightly    predniSONE (DELTASONE) 1 mg, oral, Daily    rivaroxaban (Xarelto) 15 mg tablet 1 tablet, oral, Daily with evening meal, Take with food.     spironolactone (ALDACTONE) 25 mg, oral, Daily    tamsulosin (FLOMAX) 0.4 mg, oral, Nightly    vitamin E 400 Units, oral, Daily         Objective   Physical Exam:  Last Recorded Vitals:      11/2/2023    12:00 PM 11/2/2023     4:00 PM 11/2/2023     8:00 PM 11/3/2023    12:00 AM 11/3/2023     4:00 AM 11/3/2023     8:00 AM 11/13/2023     1:03 PM   Vitals   Systolic 147 124 153 138 136 163 150   Diastolic 87 58 72 64 63 69 64   Heart Rate 112 84 55 51 65 63 60   Temp 36 °C (96.8 °F) 36.1 °C (97 °F) 36.2 °C (97.2 °F) 36 °C (96.8 °F) 36 °C (96.8 °F) 36.2 °C (97.2 °F) 36.6 °C (97.8 °F)   Resp 16 16 18 20 18 18    Weight (lb)       164   BMI       21.64 kg/m2   BSA (m2)       1.96 m2   Visit Report       Report    Visit Vitals  Smoking Status Never      Gen: NAD, sitting comfortably  HEENT: NC/AT  Card: Irregularly irregular, 2/6 HSM, 1/4 diastolic murmur  Pulm: Clear to auscultation bilaterally  Ext: LLE redness  Neuro: No focal deficits    Diagnostic Results      My Interpretation of Reviewed Study(s):  Prior ECGs (reviewed and my interpretation):   11/3/2023: Atrial Fibrillation, HR 65bpm    Echocardiography:  11/3/2023  Transthoracic Echo (TTE) Complete    Result Date: 11/3/2023            [facility] [address] [address]    Tel [phone] Fax [phone] TRANSTHORACIC ECHOCARDIOGRAM REPORT  Patient Name:      VIPUL Interiano Physician:   [id number] Jarvis Freeman MD Study Date:        11/2/2023          Ordering Provider:   [id number] ESTER CARLSON MRN/PID:           [id number]           Fellow: Accession#:        [id number]       Nurse: Date of Birth/Age: 9/7/1931 / 89      Sonographer:         Bettie Hopkins DCS                    years Gender:            M                  Additional Staff: Height:            185.42 cm          Admit Date: Weight:            79.38 kg           Admission Status:    Inpatient - Routine BSA:                2.03 m2            Department Location: [facility] Echo Lab Blood Pressure: 132 /60 mmHg Study Type:    TRANSTHORACIC ECHO (TTE) COMPLETE Diagnosis/ICD: Other chest pain-R07.89 Indication:    chest pain  Study Detail: The following Echo studies were performed: 2D, M-Mode, Doppler and               color flow.  PHYSICIAN INTERPRETATION: Left Ventricle: Left ventricular systolic function is mildly decreased. There is global hypokinesis of the left ventricle with minor regional variations. The left ventricular cavity size is mildly dilated. Spectral Doppler shows an impaired relaxation pattern of left ventricular diastolic filling. Left Atrium: The left atrium is severely dilated. Right Ventricle: The right ventricle is mildly enlarged. There is reduced right ventricular systolic function. Right Atrium: The right atrium is mild to moderately dilated. Aortic Valve: The aortic valve appears structurally normal. There is moderate aortic valve regurgitation. The peak instantaneous gradient of the aortic valve is 10.0 mmHg. The mean gradient of the aortic valve is 6.0 mmHg. Mitral Valve: The mitral valve is normal in structure. There is mild mitral valve regurgitation. Tricuspid Valve: The tricuspid valve is structurally normal. There is mild to moderate tricuspid regurgitation. Pulmonic Valve: The pulmonic valve was not assessed. The pulmonic valve regurgitation was not assessed. Pericardium: There is no pericardial effusion noted. Aorta: The aortic root is abnormal. There is mild dilatation of the aortic root.  CONCLUSIONS:  1. Left ventricular systolic function is mildly decreased.  2. Spectral Doppler shows an impaired relaxation pattern of left ventricular diastolic filling.  3. There is reduced right ventricular systolic function.  4. The left atrium is severely dilated.  5. The right atrium is mild to moderately dilated.  6. Mild to moderate tricuspid regurgitation.  7. Moderate aortic valve regurgitation.  8.  Dilated left ventricle, EF 45-50% with global hypokinesis.  9. Moderate aortic valve regurgitation no significant stenosis. 10. Severely dilated left atrium with mild mitral regurgitation. 11. Right ventricle function is mildly reduced. 12. Patient wire noted in the right heart. 13. There is global hypokinesis of the left ventricle with minor regional variations. QUANTITATIVE DATA SUMMARY: 2D MEASUREMENTS:                           Normal Ranges: LAs:           4.80 cm    (2.7-4.0cm) IVSd:          1.13 cm    (0.6-1.1cm) LVPWd:         1.22 cm    (0.6-1.1cm) LVIDd:         5.99 cm    (3.9-5.9cm) LVIDs:         4.19 cm LV Mass Index: 149.7 g/m2 LV % FS        30.1 % LA VOLUME:                             Normal Ranges: LA Area A4C:     18.6 cm2 LA Area A2C:     26.9 cm2 LA Volume Index: 37.0 ml/m2 LA Vol A4C:      52.0 ml LA Vol A2C:      97.0 ml LA Vol BP:       75.0 ml M-MODE MEASUREMENTS:                  Normal Ranges: Ao Root: 4.20 cm (2.0-3.7cm) AoV Exc: 2.60 cm (1.5-2.5cm) AORTA MEASUREMENTS:                    Normal Ranges: AoV Exc:   2.60 cm (1.5-2.5cm) Asc Ao, d: 4.20 cm (2.1-3.4cm) LV SYSTOLIC FUNCTION BY 2D PLANIMETRY (MOD):                     Normal Ranges: EF-A4C View: 54.2 % (>=55%) EF-A2C View: 42.2 % EF-Biplane:  49.0 % LV DIASTOLIC FUNCTION:                        Normal Ranges: MV Peak E:    0.58 m/s (0.7-1.2 m/s) MV Peak A:    0.40 m/s (0.42-0.7 m/s) E/A Ratio:    1.46     (1.0-2.2) MV e'         0.09 m/s (>8.0) MV lateral e' 0.11 m/s MV medial e'  0.08 m/s E/e' Ratio:   6.67     (<8.0) MITRAL VALVE:                 Normal Ranges: MV DT: 311 msec (150-240msec) MITRAL INSUFFICIENCY:                      Normal Ranges: MR Vmax: 442.00 cm/s AORTIC VALVE:                                    Normal Ranges: AoV Vmax:                1.58 m/s  (<=1.7m/s) AoV Peak PG:             10.0 mmHg (<20mmHg) AoV Mean P.0 mmHg  (1.7-11.5mmHg) LVOT Max Dre:            0.80 m/s  (<=1.1m/s) AoV VTI:                  44.30 cm  (18-25cm) LVOT VTI:                22.40 cm LVOT Diameter:           2.30 cm   (1.8-2.4cm) AoV Area, VTI:           2.10 cm2  (2.5-5.5cm2) AoV Area,Vmax:           2.12 cm2  (2.5-4.5cm2) AoV Dimensionless Index: 0.51 AORTIC INSUFFICIENCY: AI Vmax:       3.06 m/s AI Half-time:  1078 msec AI Decel Rate: 88.33 cm/s2  RIGHT VENTRICLE: RV Basal 3.10 cm RV Mid   1.80 cm RV Major 7.5 cm TAPSE:   17.0 mm TRICUSPID VALVE/RVSP:                             Normal Ranges: Peak TR Velocity: 2.38 m/s RV Syst Pressure: 25.7 mmHg (< 30mmHg)  [id number] Jarvis Freeman MD Electronically signed on 11/3/2023 at 11:40:47 AM  ** Final **       Stress Test:   12/3/2021  IMPRESSION:  1. No definite evidence of ischemia.  2. Suspicion of an infarct along the distal anterior/distal septal  wall extending into the apex.  3. Mild left ventricular dilatation is noted.  4. Left ventricular ejection fraction estimated at 53%.    Relevant Labs:  Lab Results   Component Value Date    CREATININE 1.46 (H) 11/03/2023    CREATININE 1.27 11/02/2023    CREATININE 1.43 (H) 11/01/2023    K 4.4 11/03/2023    K 4.2 11/02/2023    K 4.6 11/01/2023    HGBA1C 7.0 (A) 05/10/2023    HGBA1C 7.4 (A) 01/04/2023    HGBA1C 6.3 (A) 12/06/2021    HGB 12.6 (L) 11/03/2023    HGB 12.2 (L) 11/02/2023    HGB 14.1 11/01/2023    INR 2.5 (H) 12/27/2022    INR 2.0 (H) 12/05/2022    INR 2.3 (H) 11/17/2022    AST 13 11/02/2023    AST 16 11/01/2023    AST 16 10/27/2023    ALT 9 (L) 11/02/2023    ALT 11 11/01/2023    ALT 9 (L) 10/27/2023       Assessment/Plan   Assessment and Plan:  Hugo Weaver is a 89 y.o. year old male patient who is referred for management and evaluation of symptomatic bradycardia. Heart rate 65 bpm he has a history of CAD status post CABG, ischemic cardiomyopathy with a fluctuating ejection fraction between 35 and 50%, as well as permanent atrial fibrillation.  He has been unable to tolerate AV adrian agents in the past due to symptomatic  bradycardia.  He has had a recent hospital admission where he was noted to be bradycardic to the 30s.  He had some weakness at that time, which is unclear whether was related to the cellulitis or his bradycardia.  However today, he reports to me that he has had episodes in the past of feeling weak and fatigued, that improve when he moves around and increases his heart rate.  He also notes significant nausea and weakness sometimes after eating a large meal, which also improves after increasing his heart rate with exertion, which may be explained by vagal induced bradycardia after eating because of symptomatic bradycardia.    I think that he would benefit from pacemaker implantation symptomatically.  As he has no evidence of AV block, I do not think that pacemaker implantation is urgent, but I did explain to him that I think that pacemaker implantation will improve his quality of life.    I was slightly worried about his recurrent cellulitis episodes, especially in the context of possible pacemaker infection.  I initially outlined him the out options of implantation of a single lead pacemaker with left bundle area pacing versus a leadless pacemaker.  However, after reviewing his echocardiograms, I realized that his cardiomyopathy and heart failure are more symptomatic than I had initially thought.  Therefore, I think if we do implanted device, the best option would be a CRT-P system (the RV lead will still be a left bundle area pacing lead).  This will likely improve his symptomatic bradycardia, and may also help with fluid retention/heart failure symptoms.    He would like to think about these options. 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 It would also help to add on a beta-blocker for his ischemic cardiomyopathy. I also went through the risks of pacemaker implantation, including but not  limited to bleeding, infection, lead dislodgement,  pneumothorax, cardiac perforation, need for cardiac or vascular surgery, myocardial infarction, stroke, and a small chance of death. Additionally, we discussed possible long-term complications including -initiated device recalls, lead integrity issues, and late infections.      He wishes to discuss with Dr. Jara and then will get back to me about scheduling device implantation.     Return to Clinic:  Patient will reach out to clinic when he decides to move forward with pacemaker implantation    Thank you very much for allowing me to participate in the care of this patient. Please do not hesitate to contact me with any further questions or concerns.    Andrez Gibbons MD  Clinical Cardiac Electrophysiologist  Director of Atrial Fibrillation Ablation, [facility]  Director of Ventricular Arrhythmias Research, [facility]  Office Phone Number: [phone]

## 2023-11-16 ENCOUNTER — PATIENT OUTREACH (OUTPATIENT)
Dept: PRIMARY CARE | Facility: CLINIC | Age: 88
End: 2023-11-16
Payer: MEDICARE

## 2023-11-21 ENCOUNTER — OFFICE VISIT (OUTPATIENT)
Dept: CARDIOLOGY | Facility: CLINIC | Age: 88
End: 2023-11-21
Payer: MEDICARE

## 2023-11-21 VITALS
BODY MASS INDEX: 22.53 KG/M2 | HEIGHT: 73 IN | SYSTOLIC BLOOD PRESSURE: 122 MMHG | HEART RATE: 63 BPM | DIASTOLIC BLOOD PRESSURE: 70 MMHG | OXYGEN SATURATION: 99 % | WEIGHT: 170 LBS

## 2023-11-21 DIAGNOSIS — I48.11 LONGSTANDING PERSISTENT ATRIAL FIBRILLATION (MULTI): Primary | ICD-10-CM

## 2023-11-21 DIAGNOSIS — I25.709 ATHEROSCLEROSIS OF CORONARY ARTERY BYPASS GRAFT OF NATIVE HEART WITH ANGINA PECTORIS (CMS-HCC): ICD-10-CM

## 2023-11-21 DIAGNOSIS — I10 HYPERTENSION, UNSPECIFIED TYPE: ICD-10-CM

## 2023-11-21 DIAGNOSIS — E78.5 HYPERLIPIDEMIA, UNSPECIFIED HYPERLIPIDEMIA TYPE: ICD-10-CM

## 2023-11-21 DIAGNOSIS — I50.22 CHRONIC SYSTOLIC HEART FAILURE (MULTI): ICD-10-CM

## 2023-11-21 DIAGNOSIS — I10 BENIGN ESSENTIAL HYPERTENSION: ICD-10-CM

## 2023-11-21 PROBLEM — I50.9 CHF (CONGESTIVE HEART FAILURE) (MULTI): Status: RESOLVED | Noted: 2023-03-12 | Resolved: 2023-11-21

## 2023-11-21 PROBLEM — E78.2 COMBINED HYPERLIPIDEMIA: Status: RESOLVED | Noted: 2023-03-12 | Resolved: 2023-11-21

## 2023-11-21 PROBLEM — I25.10 ARTERIOSCLEROTIC CORONARY ARTERY DISEASE: Status: RESOLVED | Noted: 2023-03-12 | Resolved: 2023-11-21

## 2023-11-21 PROCEDURE — 3074F SYST BP LT 130 MM HG: CPT | Performed by: INTERNAL MEDICINE

## 2023-11-21 PROCEDURE — 99214 OFFICE O/P EST MOD 30 MIN: CPT | Performed by: INTERNAL MEDICINE

## 2023-11-21 PROCEDURE — 1126F AMNT PAIN NOTED NONE PRSNT: CPT | Performed by: INTERNAL MEDICINE

## 2023-11-21 PROCEDURE — 1160F RVW MEDS BY RX/DR IN RCRD: CPT | Performed by: INTERNAL MEDICINE

## 2023-11-21 PROCEDURE — 1111F DSCHRG MED/CURRENT MED MERGE: CPT | Performed by: INTERNAL MEDICINE

## 2023-11-21 PROCEDURE — 1036F TOBACCO NON-USER: CPT | Performed by: INTERNAL MEDICINE

## 2023-11-21 PROCEDURE — 1159F MED LIST DOCD IN RCRD: CPT | Performed by: INTERNAL MEDICINE

## 2023-11-21 PROCEDURE — 3078F DIAST BP <80 MM HG: CPT | Performed by: INTERNAL MEDICINE

## 2023-11-21 RX ORDER — ENALAPRIL MALEATE 5 MG/1
5 TABLET ORAL NIGHTLY
Qty: 90 TABLET | Refills: 3 | Status: SHIPPED | OUTPATIENT
Start: 2023-11-21 | End: 2024-11-20

## 2023-11-21 ASSESSMENT — ENCOUNTER SYMPTOMS
OCCASIONAL FEELINGS OF UNSTEADINESS: 1
LOSS OF SENSATION IN FEET: 0
DEPRESSION: 0

## 2023-11-21 ASSESSMENT — PAIN SCALES - GENERAL: PAINLEVEL: 0-NO PAIN

## 2023-11-21 NOTE — PROGRESS NOTES
Chief Complaint:   No chief complaint on file.     History Of Present Illness:    Hugo Weaver is a 89 y.o. male with a history of LBBB, CAD s/p CABG (2003 LIMA to LAD, VG to OM, and VG to right PDA), peripheral neuropathy, atrial fibrillation, chronic systolic heart failure, abdominal aortic aneurysm s/p repair, diabetes and chronic kidney disease here for follow-up.     He still has intermittent episodes of feeling weak.  His pulse will be low at that time by his pulse oximeter.  If he gets up and marches around he starts to feel little better.    [place] [facility] [place] for atypical chest pain.  Cardiac markers were negative.  Patient did have atrial fibrillation with slow ventricular response at times.  It was recommended that EP evaluate the patient however EP was unavailable prior to the patient being discharged.  He was seen as an outpatient shortly thereafter.    In terms of his heart failure we could not initiate beta-blockers due to his bradycardia.  He was continued on Aldactone, Vasotec, and diuretics.    Was seen by Dr. Gibbons (EP) recently who recommended CRT-P to the patient.  The patient wanted to discuss that with me prior to proceeding with any intervention.    Echocardiogram 11/2/2023: Mildly reduced LV function.  Mildly enlarged RV with mildly reduced RV function.  Severe left atrial enlargement and mild to moderate right atrial enlargement.  Moderate AR, mild MR, and mild to moderate TR.  RVSP 26 mmHg.     Echocardiogram 3/1/2023: EF 40 to 45%. Abnormal septal motion consistent with LBBB. Mildly reduced right ventricular systolic function. Severe left atrial enlargement and mild right atrial enlargement. Moderate AR.     Lexiscan nuclear stress test 12/6/2021: No evidence of ischemia. Possible scar in the distal anterior and distal septal wall extending to the apex. EF 53%.     24-hour Holter February 2021: Atrial fibrillation 100% of the time. Average heart  rate 57 bpm ( bpm). Pause of 3 seconds at 4 AM on day 2.     Echocardiogram 6/12/2015: EF 30 to 35%. Mild to moderate left atrial enlargement and right atrial enlargement. Mild AR and TR.     Catheterization 12/31/2007: 95 to 99% stenosis of the mid circumflex and 80% stenosis of the right PDA. Vein graft to PDA occluded. Vein graft to OM patent. LIMA was atretic and nonfunctional with note of anterograde flow through the native LAD system.      Past Medical History:  He has a past medical history of Atrial fibrillation (CMS/HCC), Cataract, Dyspnea, unspecified, Heart disease, HL (hearing loss), Hypertension, Other specified postprocedural states, Personal history of other diseases of the circulatory system, Personal history of other diseases of the circulatory system, Personal history of other diseases of the circulatory system, Personal history of other diseases of the digestive system (09/27/2018), Personal history of other endocrine, nutritional and metabolic disease, Personal history of other endocrine, nutritional and metabolic disease, Personal history of other endocrine, nutritional and metabolic disease, Personal history of other medical treatment, and Personal history of other specified conditions (12/30/2015).    Past Surgical History:  He has a past surgical history that includes Other surgical history (11/17/2014); Other surgical history (11/05/2018); Other surgical history (12/03/2019); Other surgical history (07/25/2019); CT angio abdomen pelvis w and or wo IV IV contrast (03/18/2020); Coronary artery bypass graft (10/2005); and Cholecystectomy (10/2018).      Social History:  He reports that he has never smoked. He has never used smokeless tobacco. He reports that he does not currently use alcohol. He reports that he does not use drugs.    Family History:  Family History   Problem Relation Name Age of Onset    Diabetes Mother Clemencia     Other (Cardiac disorder) Mother Clemencia     Heart disease  "Mother Clemencia     Hypertension Mother Clemencia     Arthritis Mother Clemencia     No Known Problems Father          Allergies:  Atorvastatin and Simvastatin    Outpatient Medications:  Current Outpatient Medications   Medication Instructions    ascorbic acid (Vitamin C) 1,000 mg tablet 1 tablet, oral, Daily    azaTHIOprine (IMURAN) 50 mg, oral, Every morning    enalapril (VASOTEC) 5 mg, oral, Nightly    magnesium oxide (MAG-OX) 400 mg, oral, Daily    omega 3-dha-epa-fish oil (Fish OiL) 1,000 mg (120 mg-180 mg) capsule oral    pravastatin (Pravachol) 20 mg tablet 1 tablet, oral, Nightly    predniSONE (DELTASONE) 1 mg, oral, Daily    rivaroxaban (Xarelto) 15 mg tablet 1 tablet, oral, Daily with evening meal, Take with food.    spironolactone (ALDACTONE) 25 mg, oral, Daily    tamsulosin (FLOMAX) 0.4 mg, oral, Nightly    vitamin E 400 Units, oral, Daily       Last Recorded Vitals:  Visit Vitals  /70 (BP Location: Left arm, Patient Position: Sitting)   Pulse 63   Ht 1.854 m (6' 1\")   Wt 77.1 kg (170 lb)   SpO2 99%   BMI 22.43 kg/m²   Smoking Status Never   BSA 1.99 m²        Physical Exam:   In general: alert and in no acute distress.   HEENT: Mucous membranes moist, carotid upstrokes normal with no bruits. JVP is normal. No cervical lymphadenopathy. Cranial nerves II-XII grossly intact.  Pulmonary: Clear to auscultation bilaterally.  Cardiovascular: S1,S2, regular. No appreciable murmurs, rubs or gallops.   Lower extremities: Warm. 2+ distal pulses. Trivial edema.  Skin: warm and dry. No obvious rashes visualized.  Psychiatric: Oriented to person, place and time. No obvious agitation.     Last Labs:  CBC -  Lab Results   Component Value Date    WBC 6.6 11/03/2023    HGB 12.6 (L) 11/03/2023    HCT 38.8 (L) 11/03/2023    MCV 96 11/03/2023     11/03/2023       CMP -  Lab Results   Component Value Date    CALCIUM 9.1 11/03/2023    PHOS 3.2 03/08/2023    PROT 5.6 (L) 11/02/2023    ALBUMIN 3.6 11/02/2023    AST 13 " 11/02/2023    ALT 9 (L) 11/02/2023    ALKPHOS 26 (L) 11/02/2023    BILITOT 0.6 11/02/2023       LIPID PANEL -   Lab Results   Component Value Date    CHOL 167 01/04/2023    TRIG 176 (H) 01/04/2023    HDL 50.1 01/04/2023    CHHDL 3.3 01/04/2023    LDLF 82 01/04/2023    VLDL 35 01/04/2023       RENAL FUNCTION PANEL -   Lab Results   Component Value Date    GLUCOSE 151 (H) 11/03/2023     11/03/2023    K 4.4 11/03/2023     (H) 11/03/2023    CO2 23 11/03/2023    ANIONGAP 12 11/03/2023    BUN 25 (H) 11/03/2023    CREATININE 1.46 (H) 11/03/2023    GFRMALE 47 (A) 07/11/2023    CALCIUM 9.1 11/03/2023    PHOS 3.2 03/08/2023    ALBUMIN 3.6 11/02/2023        Lab Results   Component Value Date     (H) 11/01/2023    HGBA1C 7.0 (A) 05/10/2023           Assessment/Plan    1) CAD: No symptoms of exertional chest pain or shortness of breath. Based on the ISCHEMIA Trial no need for routine stress testing.     2) atrial fibrillation: Continue Xarelto 15 mg daily.     3) left bundle branch block: Chronic     4) chronic systolic heart failure: Patient with diminished EF intolerant to beta-blockers due to symptomatic bradycardia. Not interested in changing/adding heart failure medications at this time.  Spoke to him about the CRT-P and he and his son think they may proceed with this.  He will reach out to Dr. Gibbons's office.     5) dyslipidemia: LDL 82 most recently in January.  Continue with pravastatin     6) follow-up: As scheduled or sooner if needed      Jef Jara MD

## 2023-11-24 DIAGNOSIS — E78.5 DYSLIPIDEMIA: ICD-10-CM

## 2023-11-24 DIAGNOSIS — I10 BENIGN ESSENTIAL HYPERTENSION: ICD-10-CM

## 2023-11-24 DIAGNOSIS — I50.22 CHRONIC SYSTOLIC HEART FAILURE (MULTI): ICD-10-CM

## 2023-11-27 RX ORDER — PRAVASTATIN SODIUM 20 MG/1
20 TABLET ORAL NIGHTLY
Qty: 90 TABLET | Refills: 3 | Status: SHIPPED | OUTPATIENT
Start: 2023-11-27 | End: 2023-12-26 | Stop reason: SDUPTHER

## 2023-11-27 RX ORDER — SPIRONOLACTONE 25 MG/1
25 TABLET ORAL DAILY
Qty: 90 TABLET | Refills: 3 | Status: SHIPPED | OUTPATIENT
Start: 2023-11-27 | End: 2023-12-26 | Stop reason: SDUPTHER

## 2023-11-28 ENCOUNTER — TELEMEDICINE (OUTPATIENT)
Dept: DERMATOLOGY | Facility: CLINIC | Age: 88
End: 2023-11-28
Payer: MEDICARE

## 2023-11-28 DIAGNOSIS — L12.0 BULLOUS PEMPHIGOID (MULTI): Primary | ICD-10-CM

## 2023-11-28 DIAGNOSIS — Z92.25 PERSONAL HISTORY OF IMMUNOSUPRESSION THERAPY: ICD-10-CM

## 2023-11-28 DIAGNOSIS — Z79.899 OTHER LONG TERM (CURRENT) DRUG THERAPY: ICD-10-CM

## 2023-11-28 PROCEDURE — 99213 OFFICE O/P EST LOW 20 MIN: CPT | Performed by: DERMATOLOGY

## 2023-11-28 RX ORDER — PREDNISONE 1 MG/1
1 TABLET ORAL DAILY
Qty: 90 TABLET | Refills: 3 | Status: SHIPPED | OUTPATIENT
Start: 2023-11-28 | End: 2024-03-05 | Stop reason: WASHOUT

## 2023-11-28 RX ORDER — CLOBETASOL PROPIONATE 0.5 MG/G
CREAM TOPICAL 2 TIMES DAILY
Qty: 60 G | Refills: 11 | Status: SHIPPED | OUTPATIENT
Start: 2023-11-28 | End: 2023-12-12

## 2023-11-28 RX ORDER — CLOBETASOL PROPIONATE 0.5 MG/G
OINTMENT TOPICAL 2 TIMES DAILY
Qty: 60 G | Refills: 3 | Status: CANCELLED | OUTPATIENT
Start: 2023-11-28 | End: 2023-12-12

## 2023-11-28 RX ORDER — AZATHIOPRINE 50 MG/1
TABLET ORAL
Qty: 75 TABLET | Refills: 11 | Status: ON HOLD | OUTPATIENT
Start: 2023-11-28 | End: 2024-04-17

## 2023-11-29 NOTE — PROGRESS NOTES
Subjective     Hugo Weaver is a 89 y.o. male who presents for the following: Bullous Pemphigoid.     Virtual visit for follow up of bullous pemphigoid. Patient currently takes azathioprine 50 mg qAM and 75 mg qPM and prednisone 1 mg daily. He reports no flares and his skin remains clear.     Review of Systems:  No other skin or systemic complaints other than what is documented elsewhere in the note.    The following portions of the chart were reviewed this encounter and updated as appropriate:          Skin Cancer History  No skin cancer on file.      Specialty Problems          Dermatology Problems    Bullous pemphigoid    Papilloma of eyelid    Pemphigus    Actinic keratosis    Bullous eruption    Lichen simplex chronicus    Other follicular cysts of the skin and subcutaneous tissue    Rash and other nonspecific skin eruption        Objective   Well appearing patient in no apparent distress; mood and affect are within normal limits.    A focused skin examination was performed. All findings within normal limits unless otherwise noted below.    Assessment/Plan   1. Bullous pemphigoid  Skin is clear.     Bullous pemphigoid, well-controlled with azathioprine and prednisone  - Continue azathioprine 50 mg qAM and 75mg qPM  - Patient has previously flared when tapering prednisone. Continue prednisone 1 mg daily.   - Continue clobetasol 0.05% ointment PRN.   - Continue calcium and vitamin D supplementation  - CMP, CBC w/ diff on 11/1/23 are stable  - RTC in 4 months VV    Related Procedures  Follow Up In Dermatology - Established Patient    Related Medications  predniSONE (Deltasone) 1 mg tablet  Take 1 tablet (1 mg) by mouth once daily.    clobetasol (Temovate) 0.05 % cream  Apply topically 2 times a day for 14 days. Avoid face/ skin folds.    azaTHIOprine (Imuran) 50 mg tablet  Take 1 tablet (50mg) every morning and 1.5 tablets (75mg) every evening.    2. Personal history of immunosupression therapy    3. Other long  term (current) drug therapy      RTC in 4 months.     Dimple Alvarado,     I saw and evaluated the patient. I personally obtained the key and critical portions of the history and physical exam or was physically present for key and critical portions performed by the resident/fellow. I reviewed the resident/fellow's documentation and discussed the patient with the resident/fellow. I agree with the resident/fellow's medical decision making as documented in the note.    Guanakito Kang MD PhD

## 2023-12-06 ENCOUNTER — LAB (OUTPATIENT)
Dept: LAB | Facility: LAB | Age: 88
End: 2023-12-06
Payer: MEDICARE

## 2023-12-06 DIAGNOSIS — Z00.00 ROUTINE HEALTH MAINTENANCE: ICD-10-CM

## 2023-12-06 DIAGNOSIS — E78.2 COMBINED HYPERLIPIDEMIA: ICD-10-CM

## 2023-12-06 DIAGNOSIS — E55.9 VITAMIN D DEFICIENCY: ICD-10-CM

## 2023-12-06 DIAGNOSIS — E11.9 TYPE 2 DIABETES MELLITUS WITHOUT COMPLICATION, WITHOUT LONG-TERM CURRENT USE OF INSULIN (MULTI): ICD-10-CM

## 2023-12-06 DIAGNOSIS — N40.1 BENIGN PROSTATIC HYPERPLASIA WITH LOWER URINARY TRACT SYMPTOMS: Primary | ICD-10-CM

## 2023-12-06 LAB
25(OH)D3 SERPL-MCNC: 30 NG/ML (ref 30–100)
CHOLEST SERPL-MCNC: 160 MG/DL (ref 0–199)
CHOLESTEROL/HDL RATIO: 3.3
EST. AVERAGE GLUCOSE BLD GHB EST-MCNC: 140 MG/DL
HBA1C MFR BLD: 6.5 %
HDLC SERPL-MCNC: 49.1 MG/DL
LDLC SERPL CALC-MCNC: 86 MG/DL
NON HDL CHOLESTEROL: 111 MG/DL (ref 0–149)
PSA SERPL-MCNC: 1.84 NG/ML
TRIGL SERPL-MCNC: 126 MG/DL (ref 0–149)
TSH SERPL-ACNC: 3.9 MIU/L (ref 0.44–3.98)
VIT B12 SERPL-MCNC: 299 PG/ML (ref 211–911)
VLDL: 25 MG/DL (ref 0–40)

## 2023-12-06 PROCEDURE — 36415 COLL VENOUS BLD VENIPUNCTURE: CPT

## 2023-12-06 PROCEDURE — 84153 ASSAY OF PSA TOTAL: CPT

## 2023-12-06 PROCEDURE — 84443 ASSAY THYROID STIM HORMONE: CPT

## 2023-12-06 PROCEDURE — 80061 LIPID PANEL: CPT

## 2023-12-06 PROCEDURE — 83036 HEMOGLOBIN GLYCOSYLATED A1C: CPT

## 2023-12-06 PROCEDURE — 82607 VITAMIN B-12: CPT

## 2023-12-06 PROCEDURE — 82306 VITAMIN D 25 HYDROXY: CPT

## 2023-12-13 ENCOUNTER — HOSPITAL ENCOUNTER (OUTPATIENT)
Dept: CARDIOLOGY | Facility: HOSPITAL | Age: 88
Discharge: HOME | End: 2023-12-13
Payer: MEDICARE

## 2023-12-13 LAB
ATRIAL RATE: 340 BPM
ATRIAL RATE: 68 BPM
PR INTERVAL: 184 MS
Q ONSET: 209 MS
Q ONSET: 209 MS
QRS COUNT: 10 BEATS
QRS COUNT: 11 BEATS
QRS DURATION: 160 MS
QRS DURATION: 162 MS
QT INTERVAL: 426 MS
QT INTERVAL: 454 MS
QTC CALCULATION(BAZETT): 452 MS
QTC CALCULATION(BAZETT): 472 MS
QTC FREDERICIA: 444 MS
QTC FREDERICIA: 466 MS
R AXIS: -62 DEGREES
R AXIS: -66 DEGREES
T AXIS: 83 DEGREES
T AXIS: 84 DEGREES
T OFFSET: 422 MS
T OFFSET: 436 MS
VENTRICULAR RATE: 65 BPM
VENTRICULAR RATE: 68 BPM

## 2023-12-13 PROCEDURE — 93005 ELECTROCARDIOGRAM TRACING: CPT | Mod: MUE

## 2023-12-13 PROCEDURE — 93005 ELECTROCARDIOGRAM TRACING: CPT

## 2023-12-19 ENCOUNTER — PATIENT OUTREACH (OUTPATIENT)
Dept: PRIMARY CARE | Facility: CLINIC | Age: 88
End: 2023-12-19
Payer: MEDICARE

## 2023-12-19 NOTE — PROGRESS NOTES
Call placed regarding one month post discharge follow up call.  At time of outreach call the patient feels as if their condition has returned to baseline since initial visit with PCP or specialist.  Questions or concerns regarding recovery period addressed at this time. No questions or concerns at this time.  Reviewed any PCP or specialists progress notes/labs/radiology reports if applicable and addressed any questions or concerns.

## 2023-12-26 DIAGNOSIS — E78.5 DYSLIPIDEMIA: ICD-10-CM

## 2023-12-26 DIAGNOSIS — I50.22 CHRONIC SYSTOLIC HEART FAILURE (MULTI): ICD-10-CM

## 2023-12-30 RX ORDER — SPIRONOLACTONE 25 MG/1
25 TABLET ORAL DAILY
Qty: 90 TABLET | Refills: 3 | Status: SHIPPED | OUTPATIENT
Start: 2023-12-30 | End: 2024-12-29

## 2023-12-30 RX ORDER — PRAVASTATIN SODIUM 20 MG/1
20 TABLET ORAL NIGHTLY
Qty: 90 TABLET | Refills: 3 | Status: SHIPPED | OUTPATIENT
Start: 2023-12-30 | End: 2024-12-29

## 2024-01-16 ENCOUNTER — PATIENT OUTREACH (OUTPATIENT)
Dept: PRIMARY CARE | Facility: CLINIC | Age: 89
End: 2024-01-16
Payer: MEDICARE

## 2024-01-17 ENCOUNTER — HOSPITAL ENCOUNTER (OUTPATIENT)
Facility: HOSPITAL | Age: 89
Setting detail: OUTPATIENT SURGERY
End: 2024-01-17
Attending: INTERNAL MEDICINE | Admitting: INTERNAL MEDICINE
Payer: MEDICARE

## 2024-01-17 ENCOUNTER — OFFICE VISIT (OUTPATIENT)
Dept: CARDIOLOGY | Facility: CLINIC | Age: 89
End: 2024-01-17
Payer: MEDICARE

## 2024-01-17 VITALS
BODY MASS INDEX: 22.26 KG/M2 | RESPIRATION RATE: 16 BRPM | SYSTOLIC BLOOD PRESSURE: 132 MMHG | OXYGEN SATURATION: 99 % | TEMPERATURE: 97.8 F | WEIGHT: 168 LBS | DIASTOLIC BLOOD PRESSURE: 63 MMHG | HEIGHT: 73 IN | HEART RATE: 59 BPM

## 2024-01-17 DIAGNOSIS — R00.1 SYMPTOMATIC BRADYCARDIA: Primary | ICD-10-CM

## 2024-01-17 PROCEDURE — 1036F TOBACCO NON-USER: CPT | Performed by: INTERNAL MEDICINE

## 2024-01-17 PROCEDURE — 99214 OFFICE O/P EST MOD 30 MIN: CPT | Performed by: INTERNAL MEDICINE

## 2024-01-17 PROCEDURE — 93005 ELECTROCARDIOGRAM TRACING: CPT | Performed by: INTERNAL MEDICINE

## 2024-01-17 PROCEDURE — 3078F DIAST BP <80 MM HG: CPT | Performed by: INTERNAL MEDICINE

## 2024-01-17 PROCEDURE — 3075F SYST BP GE 130 - 139MM HG: CPT | Performed by: INTERNAL MEDICINE

## 2024-01-17 PROCEDURE — 1126F AMNT PAIN NOTED NONE PRSNT: CPT | Performed by: INTERNAL MEDICINE

## 2024-01-17 PROCEDURE — 1160F RVW MEDS BY RX/DR IN RCRD: CPT | Performed by: INTERNAL MEDICINE

## 2024-01-17 PROCEDURE — 93010 ELECTROCARDIOGRAM REPORT: CPT | Performed by: INTERNAL MEDICINE

## 2024-01-17 RX ORDER — BUMETANIDE 2 MG/1
2 TABLET ORAL DAILY PRN
COMMUNITY

## 2024-01-17 RX ORDER — POTASSIUM CHLORIDE 1.5 G/1.58G
20 POWDER, FOR SOLUTION ORAL DAILY
COMMUNITY
End: 2024-01-22 | Stop reason: ENTERED-IN-ERROR

## 2024-01-18 RX ORDER — CHLORHEXIDINE GLUCONATE 40 MG/ML
SOLUTION TOPICAL ONCE
Status: CANCELLED | OUTPATIENT
Start: 2024-01-18 | End: 2024-01-18

## 2024-01-18 RX ORDER — MUPIROCIN 20 MG/G
1 OINTMENT TOPICAL ONCE
Status: CANCELLED | OUTPATIENT
Start: 2024-01-18 | End: 2024-01-18

## 2024-01-18 RX ORDER — SODIUM CHLORIDE 9 MG/ML
20 INJECTION, SOLUTION INTRAVENOUS CONTINUOUS
Status: CANCELLED | OUTPATIENT
Start: 2024-01-18

## 2024-01-18 RX ORDER — VANCOMYCIN HYDROCHLORIDE 1 G/200ML
1000 INJECTION, SOLUTION INTRAVENOUS ONCE
Status: CANCELLED | OUTPATIENT
Start: 2024-01-18 | End: 2024-01-18

## 2024-01-18 NOTE — PROGRESS NOTES
Referring Provider: Jef Jara MD  Reason for Consult: Bradycardia    History of Present Illness:      Hugo Weaver is a 89 y.o. year old male patient with a history significant for hypertension, CAD s/p CABG in 2003, permanent atrial fibrillation, ischemic cardiomyopathy mildly reduced ejection fraction 45-50%, AAA s/p repair, diabetes, and CKD  who was referred by Dr. Jara for consideration of pacemaker for symptomatic bradycardia. He is here for follow-up today.    At his last visit, I offered him CRT-P implantation. He discussed with Dr. Jara and is agreeable to move forward with pacemaker implantation. He feels similar to last visit in terms of his symptoms, including fatigue and nausea after eating.     Focused Cardiovascular Problem List:  Permanent Atrial Fibrillation: [id number] = 6. Anticoagulated on Xarelto with lower dose of 15mg due to kidney function. Rate-controlled/bradycardic off any AV adrian agents.   CAD s/p CABG  Ischemic cardiomyopathy with  mildly reduced ejection fraction (45-50% most recent, previously as low as 35%): Unable to tolerate beta-blockers. On ACE-I and MRA.   Type 2 DM: Currently diet controlled  Hypertension  CKD      Past Medical and Surgical History:  Mr. Weaver  has a past medical history of Atrial fibrillation (CMS/[facility]), Cataract, Dyspnea, unspecified, Heart disease, HL (hearing loss), Hypertension, Other specified postprocedural states, Personal history of other diseases of the circulatory system, Personal history of other diseases of the circulatory system, Personal history of other diseases of the circulatory system, Personal history of other diseases of the digestive system (09/27/2018), Personal history of other endocrine, nutritional and metabolic disease, Personal history of other endocrine, nutritional and metabolic disease, Personal history of other endocrine, nutritional and metabolic disease, Personal history of other medical treatment, and Personal  history of other specified conditions (2015).    has a past surgical history that includes Other surgical history (2014); Other surgical history (2018); Other surgical history (2019); Other surgical history (2019); CT angio abdomen pelvis w and or wo IV IV contrast (2020); Coronary artery bypass graft (10/2005); and Cholecystectomy (10/2018).    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  Other: Wife passed away from Parkinson's in     Relevant Family History:   Family History   Problem Relation Name Age of Onset    Diabetes Mother Clemencia     Other (Cardiac disorder) Mother Clemencia     Heart disease Mother Clemencia     Hypertension Mother Clemencia     Arthritis Mother Clemencia     No Known Problems Father       Family History of Sudden Death: YesFather  of AAA rupture       Allergies:  Allergies   Allergen Reactions    Atorvastatin Unknown    Simvastatin Unknown        Medications:  Current Outpatient Medications   Medication Instructions    ascorbic acid (Vitamin C) 1,000 mg tablet 1 tablet, oral, Daily    azaTHIOprine (IMURAN) 50 mg, oral, Every morning    enalapril (VASOTEC) 5 mg, oral, Nightly    magnesium oxide (MAG-OX) 400 mg, oral, Daily    omega 3-dha-epa-fish oil (Fish OiL) 1,000 mg (120 mg-180 mg) capsule oral    pravastatin (Pravachol) 20 mg tablet 1 tablet, oral, Nightly    predniSONE (DELTASONE) 1 mg, oral, Daily    rivaroxaban (Xarelto) 15 mg tablet 1 tablet, oral, Daily with evening meal, Take with food.    spironolactone (ALDACTONE) 25 mg, oral, Daily    tamsulosin (FLOMAX) 0.4 mg, oral, Nightly    vitamin E 400 Units, oral, Daily         Objective   Physical Exam:  Last Recorded Vitals:      2023    12:00 PM 2023      4:00 PM 11/2/2023     8:00 PM 11/3/2023    12:00 AM 11/3/2023     4:00 AM 11/3/2023     8:00 AM 11/13/2023     1:03 PM   Vitals   Systolic 147 124 153 138 136 163 150   Diastolic 87 58 72 64 63 69 64   Heart Rate 112 84 55 51 65 63 60   Temp 36 °C (96.8 °F) 36.1 °C (97 °F) 36.2 °C (97.2 °F) 36 °C (96.8 °F) 36 °C (96.8 °F) 36.2 °C (97.2 °F) 36.6 °C (97.8 °F)   Resp 16 16 18 20 18 18    Weight (lb)       164   BMI       21.64 kg/m2   BSA (m2)       1.96 m2   Visit Report       Report    Visit Vitals  Smoking Status Never      Gen: NAD, sitting comfortably  HEENT: NC/AT  Card: Irregularly irregular, 2/6 HSM, 1/4 diastolic murmur  Pulm: Clear to auscultation bilaterally  Ext: LLE redness  Neuro: No focal deficits    Diagnostic Results      My Interpretation of Reviewed Study(s):  Prior ECGs (reviewed and my interpretation):   1/18/2024: Atrial fibrillation, HR 70bpm, frequent PVCs  11/3/2023: Atrial Fibrillation, HR 65bpm, LBBB QRSd 162ms    Echocardiography:  11/3/2023  Transthoracic Echo (TTE) Complete    Result Date: 11/3/2023            [facility] [id number] [facility] [id number]    Tel [phone] Fax [phone] TRANSTHORACIC ECHOCARDIOGRAM REPORT  Patient Name:      VIPUL Interiano Physician:   [id number] Jarvis Freeman MD Study Date:        11/2/2023          Ordering Provider:   [id number] ESTER CARLSON MRN/PID:           [id number]           Fellow: Accession#:        [id number]       Nurse: Date of Birth/Age: 9/7/1931 / 89      Sonographer:         Bettie Hopkins DCS                    years Gender:            M                  Additional Staff: Height:            185.42 cm          Admit Date: Weight:            79.38 kg           Admission Status:    Inpatient - Routine BSA:               2.03 m2            Department Location: [facility] Echo Lab Blood Pressure: 132 /60 mmHg Study Type:    TRANSTHORACIC ECHO (TTE) COMPLETE  Diagnosis/ICD: Other chest pain-R07.89 Indication:    chest pain  Study Detail: The following Echo studies were performed: 2D, M-Mode, Doppler and               color flow.  PHYSICIAN INTERPRETATION: Left Ventricle: Left ventricular systolic function is mildly decreased. There is global hypokinesis of the left ventricle with minor regional variations. The left ventricular cavity size is mildly dilated. Spectral Doppler shows an impaired relaxation pattern of left ventricular diastolic filling. Left Atrium: The left atrium is severely dilated. Right Ventricle: The right ventricle is mildly enlarged. There is reduced right ventricular systolic function. Right Atrium: The right atrium is mild to moderately dilated. Aortic Valve: The aortic valve appears structurally normal. There is moderate aortic valve regurgitation. The peak instantaneous gradient of the aortic valve is 10.0 mmHg. The mean gradient of the aortic valve is 6.0 mmHg. Mitral Valve: The mitral valve is normal in structure. There is mild mitral valve regurgitation. Tricuspid Valve: The tricuspid valve is structurally normal. There is mild to moderate tricuspid regurgitation. Pulmonic Valve: The pulmonic valve was not assessed. The pulmonic valve regurgitation was not assessed. Pericardium: There is no pericardial effusion noted. Aorta: The aortic root is abnormal. There is mild dilatation of the aortic root.  CONCLUSIONS:  1. Left ventricular systolic function is mildly decreased.  2. Spectral Doppler shows an impaired relaxation pattern of left ventricular diastolic filling.  3. There is reduced right ventricular systolic function.  4. The left atrium is severely dilated.  5. The right atrium is mild to moderately dilated.  6. Mild to moderate tricuspid regurgitation.  7. Moderate aortic valve regurgitation.  8. Dilated left ventricle, EF 45-50% with global hypokinesis.  9. Moderate aortic valve regurgitation no significant stenosis. 10. Severely dilated  left atrium with mild mitral regurgitation. 11. Right ventricle function is mildly reduced. 12. Patient wire noted in the right heart. 13. There is global hypokinesis of the left ventricle with minor regional variations. QUANTITATIVE DATA SUMMARY: 2D MEASUREMENTS:                           Normal Ranges: LAs:           4.80 cm    (2.7-4.0cm) IVSd:          1.13 cm    (0.6-1.1cm) LVPWd:         1.22 cm    (0.6-1.1cm) LVIDd:         5.99 cm    (3.9-5.9cm) LVIDs:         4.19 cm LV Mass Index: 149.7 g/m2 LV % FS        30.1 % LA VOLUME:                             Normal Ranges: LA Area A4C:     18.6 cm2 LA Area A2C:     26.9 cm2 LA Volume Index: 37.0 ml/m2 LA Vol A4C:      52.0 ml LA Vol A2C:      97.0 ml LA Vol BP:       75.0 ml M-MODE MEASUREMENTS:                  Normal Ranges: Ao Root: 4.20 cm (2.0-3.7cm) AoV Exc: 2.60 cm (1.5-2.5cm) AORTA MEASUREMENTS:                    Normal Ranges: AoV Exc:   2.60 cm (1.5-2.5cm) Asc Ao, d: 4.20 cm (2.1-3.4cm) LV SYSTOLIC FUNCTION BY 2D PLANIMETRY (MOD):                     Normal Ranges: EF-A4C View: 54.2 % (>=55%) EF-A2C View: 42.2 % EF-Biplane:  49.0 % LV DIASTOLIC FUNCTION:                        Normal Ranges: MV Peak E:    0.58 m/s (0.7-1.2 m/s) MV Peak A:    0.40 m/s (0.42-0.7 m/s) E/A Ratio:    1.46     (1.0-2.2) MV e'         0.09 m/s (>8.0) MV lateral e' 0.11 m/s MV medial e'  0.08 m/s E/e' Ratio:   6.67     (<8.0) MITRAL VALVE:                 Normal Ranges: MV DT: 311 msec (150-240msec) MITRAL INSUFFICIENCY:                      Normal Ranges: MR Vmax: 442.00 cm/s AORTIC VALVE:                                    Normal Ranges: AoV Vmax:                1.58 m/s  (<=1.7m/s) AoV Peak PG:             10.0 mmHg (<20mmHg) AoV Mean P.0 mmHg  (1.7-11.5mmHg) LVOT Max Dre:            0.80 m/s  (<=1.1m/s) AoV VTI:                 44.30 cm  (18-25cm) LVOT VTI:                22.40 cm LVOT Diameter:           2.30 cm   (1.8-2.4cm) AoV Area, VTI:           2.10  cm2  (2.5-5.5cm2) AoV Area,Vmax:           2.12 cm2  (2.5-4.5cm2) AoV Dimensionless Index: 0.51 AORTIC INSUFFICIENCY: AI Vmax:       3.06 m/s AI Half-time:  1078 msec AI Decel Rate: 88.33 cm/s2  RIGHT VENTRICLE: RV Basal 3.10 cm RV Mid   1.80 cm RV Major 7.5 cm TAPSE:   17.0 mm TRICUSPID VALVE/RVSP:                             Normal Ranges: Peak TR Velocity: 2.38 m/s RV Syst Pressure: 25.7 mmHg (< 30mmHg)  [id number] Jarvis Freeman MD Electronically signed on 11/3/2023 at 11:40:47 AM  ** Final **       Stress Test:   12/3/2021  IMPRESSION:  1. No definite evidence of ischemia.  2. Suspicion of an infarct along the distal anterior/distal septal  wall extending into the apex.  3. Mild left ventricular dilatation is noted.  4. Left ventricular ejection fraction estimated at 53%.    Relevant Labs:  Lab Results   Component Value Date    CREATININE 1.46 (H) 11/03/2023    CREATININE 1.27 11/02/2023    CREATININE 1.43 (H) 11/01/2023    K 4.4 11/03/2023    K 4.2 11/02/2023    K 4.6 11/01/2023    HGBA1C 7.0 (A) 05/10/2023    HGBA1C 7.4 (A) 01/04/2023    HGBA1C 6.3 (A) 12/06/2021    HGB 12.6 (L) 11/03/2023    HGB 12.2 (L) 11/02/2023    HGB 14.1 11/01/2023    INR 2.5 (H) 12/27/2022    INR 2.0 (H) 12/05/2022    INR 2.3 (H) 11/17/2022    AST 13 11/02/2023    AST 16 11/01/2023    AST 16 10/27/2023    ALT 9 (L) 11/02/2023    ALT 11 11/01/2023    ALT 9 (L) 10/27/2023       Assessment/Plan   Assessment and Plan:  Hugo Weaver is a 89 y.o. year old male patient who is referred for management and evaluation of symptomatic bradycardia. He has a history of CAD status post CABG, ischemic cardiomyopathy with a fluctuating ejection fraction between 35 and 50%, as well as permanent atrial fibrillation.  He has been unable to tolerate AV adrian agents in the past due to symptomatic bradycardia.  He has had a recent hospital admission where he was noted to be bradycardic to the 30s.     He is agreeable to move forward with pacemaker implantation.  Given that he is in slow permanent AF, has an LVEF <50%, and an expected RV pacing burden >40%, we will plan on implanting a CRT-P without an atrial lead. We discussed the risks and benefits of the procedure in detail, and he is willing to move forward with the procedure.     We will have him plan to stay the night after the procedure given his advanced age. Additionally, would like him to hold his Xarelto 2 days prior to the procedure.      Return to Clinic:  After pacemaker implantation    Thank you very much for allowing me to participate in the care of this patient. Please do not hesitate to contact me with any further questions or concerns.    Andrez Gibbons MD  Clinical Cardiac Electrophysiologist  Director of Atrial Fibrillation Ablation, [facility]  Director of Ventricular Arrhythmias Research, [facility]  Office Phone Number: [phone]

## 2024-01-22 ENCOUNTER — HOSPITAL ENCOUNTER (OUTPATIENT)
Facility: HOSPITAL | Age: 89
Setting detail: OBSERVATION
Discharge: HOME | End: 2024-01-23
Attending: EMERGENCY MEDICINE | Admitting: INTERNAL MEDICINE
Payer: MEDICARE

## 2024-01-22 ENCOUNTER — TELEPHONE (OUTPATIENT)
Dept: CARDIOLOGY | Facility: CLINIC | Age: 89
End: 2024-01-22
Payer: MEDICARE

## 2024-01-22 ENCOUNTER — APPOINTMENT (OUTPATIENT)
Dept: CARDIOLOGY | Facility: HOSPITAL | Age: 89
End: 2024-01-22
Payer: MEDICARE

## 2024-01-22 ENCOUNTER — APPOINTMENT (OUTPATIENT)
Dept: RADIOLOGY | Facility: HOSPITAL | Age: 89
End: 2024-01-22
Payer: MEDICARE

## 2024-01-22 DIAGNOSIS — L03.116 CELLULITIS OF LEFT LOWER EXTREMITY: ICD-10-CM

## 2024-01-22 DIAGNOSIS — L03.116 CELLULITIS OF LEFT LOWER LIMB: ICD-10-CM

## 2024-01-22 DIAGNOSIS — R60.0 LOCALIZED EDEMA: Primary | ICD-10-CM

## 2024-01-22 LAB
ALBUMIN SERPL BCP-MCNC: 4.3 G/DL (ref 3.4–5)
ALP SERPL-CCNC: 39 U/L (ref 33–136)
ALT SERPL W P-5'-P-CCNC: 9 U/L (ref 10–52)
ANION GAP SERPL CALC-SCNC: 15 MMOL/L (ref 10–20)
AST SERPL W P-5'-P-CCNC: 13 U/L (ref 9–39)
BASOPHILS # BLD AUTO: 0.05 X10*3/UL (ref 0–0.1)
BASOPHILS NFR BLD AUTO: 0.7 %
BILIRUB SERPL-MCNC: 0.7 MG/DL (ref 0–1.2)
BNP SERPL-MCNC: 259 PG/ML (ref 0–99)
BUN SERPL-MCNC: 30 MG/DL (ref 6–23)
CALCIUM SERPL-MCNC: 9.6 MG/DL (ref 8.6–10.3)
CARDIAC TROPONIN I PNL SERPL HS: 14 NG/L (ref 0–20)
CHLORIDE SERPL-SCNC: 102 MMOL/L (ref 98–107)
CO2 SERPL-SCNC: 25 MMOL/L (ref 21–32)
CREAT SERPL-MCNC: 1.49 MG/DL (ref 0.5–1.3)
EGFRCR SERPLBLD CKD-EPI 2021: 44 ML/MIN/1.73M*2
EOSINOPHIL # BLD AUTO: 0.49 X10*3/UL (ref 0–0.4)
EOSINOPHIL NFR BLD AUTO: 6.9 %
ERYTHROCYTE [DISTWIDTH] IN BLOOD BY AUTOMATED COUNT: 14.3 % (ref 11.5–14.5)
GLUCOSE SERPL-MCNC: 123 MG/DL (ref 74–99)
HCT VFR BLD AUTO: 41.1 % (ref 41–52)
HGB BLD-MCNC: 13.3 G/DL (ref 13.5–17.5)
IMM GRANULOCYTES # BLD AUTO: 0.08 X10*3/UL (ref 0–0.5)
IMM GRANULOCYTES NFR BLD AUTO: 1.1 % (ref 0–0.9)
LACTATE SERPL-SCNC: 1.3 MMOL/L (ref 0.4–2)
LYMPHOCYTES # BLD AUTO: 0.86 X10*3/UL (ref 0.8–3)
LYMPHOCYTES NFR BLD AUTO: 12.1 %
MAGNESIUM SERPL-MCNC: 1.52 MG/DL (ref 1.6–2.4)
MCH RBC QN AUTO: 31.2 PG (ref 26–34)
MCHC RBC AUTO-ENTMCNC: 32.4 G/DL (ref 32–36)
MCV RBC AUTO: 97 FL (ref 80–100)
MONOCYTES # BLD AUTO: 0.85 X10*3/UL (ref 0.05–0.8)
MONOCYTES NFR BLD AUTO: 12 %
NEUTROPHILS # BLD AUTO: 4.75 X10*3/UL (ref 1.6–5.5)
NEUTROPHILS NFR BLD AUTO: 67.2 %
NRBC BLD-RTO: 0 /100 WBCS (ref 0–0)
PLATELET # BLD AUTO: 186 X10*3/UL (ref 150–450)
POTASSIUM SERPL-SCNC: 4.5 MMOL/L (ref 3.5–5.3)
PROT SERPL-MCNC: 6.8 G/DL (ref 6.4–8.2)
RBC # BLD AUTO: 4.26 X10*6/UL (ref 4.5–5.9)
SODIUM SERPL-SCNC: 137 MMOL/L (ref 136–145)
WBC # BLD AUTO: 7.1 X10*3/UL (ref 4.4–11.3)

## 2024-01-22 PROCEDURE — 71045 X-RAY EXAM CHEST 1 VIEW: CPT

## 2024-01-22 PROCEDURE — 83605 ASSAY OF LACTIC ACID: CPT | Performed by: INTERNAL MEDICINE

## 2024-01-22 PROCEDURE — 80053 COMPREHEN METABOLIC PANEL: CPT

## 2024-01-22 PROCEDURE — 2500000004 HC RX 250 GENERAL PHARMACY W/ HCPCS (ALT 636 FOR OP/ED): Performed by: INTERNAL MEDICINE

## 2024-01-22 PROCEDURE — 71045 X-RAY EXAM CHEST 1 VIEW: CPT | Performed by: RADIOLOGY

## 2024-01-22 PROCEDURE — 93970 EXTREMITY STUDY: CPT | Performed by: INTERNAL MEDICINE

## 2024-01-22 PROCEDURE — 83880 ASSAY OF NATRIURETIC PEPTIDE: CPT

## 2024-01-22 PROCEDURE — 96365 THER/PROPH/DIAG IV INF INIT: CPT

## 2024-01-22 PROCEDURE — 36415 COLL VENOUS BLD VENIPUNCTURE: CPT

## 2024-01-22 PROCEDURE — 84484 ASSAY OF TROPONIN QUANT: CPT

## 2024-01-22 PROCEDURE — 36415 COLL VENOUS BLD VENIPUNCTURE: CPT | Performed by: INTERNAL MEDICINE

## 2024-01-22 PROCEDURE — 99285 EMERGENCY DEPT VISIT HI MDM: CPT | Performed by: EMERGENCY MEDICINE

## 2024-01-22 PROCEDURE — G0378 HOSPITAL OBSERVATION PER HR: HCPCS

## 2024-01-22 PROCEDURE — 99223 1ST HOSP IP/OBS HIGH 75: CPT | Performed by: INTERNAL MEDICINE

## 2024-01-22 PROCEDURE — 2500000004 HC RX 250 GENERAL PHARMACY W/ HCPCS (ALT 636 FOR OP/ED)

## 2024-01-22 PROCEDURE — 85025 COMPLETE CBC W/AUTO DIFF WBC: CPT

## 2024-01-22 PROCEDURE — 93005 ELECTROCARDIOGRAM TRACING: CPT

## 2024-01-22 PROCEDURE — 83735 ASSAY OF MAGNESIUM: CPT

## 2024-01-22 PROCEDURE — 93970 EXTREMITY STUDY: CPT

## 2024-01-22 RX ORDER — AZATHIOPRINE 50 MG/1
75 TABLET ORAL NIGHTLY
COMMUNITY
End: 2024-01-26

## 2024-01-22 RX ORDER — ONDANSETRON 4 MG/1
4 TABLET, ORALLY DISINTEGRATING ORAL EVERY 8 HOURS PRN
Status: DISCONTINUED | OUTPATIENT
Start: 2024-01-22 | End: 2024-01-23 | Stop reason: HOSPADM

## 2024-01-22 RX ORDER — LANOLIN ALCOHOL/MO/W.PET/CERES
400 CREAM (GRAM) TOPICAL ONCE
Status: COMPLETED | OUTPATIENT
Start: 2024-01-22 | End: 2024-01-22

## 2024-01-22 RX ORDER — ACETAMINOPHEN 160 MG/5ML
650 SOLUTION ORAL EVERY 4 HOURS PRN
Status: DISCONTINUED | OUTPATIENT
Start: 2024-01-22 | End: 2024-01-23 | Stop reason: HOSPADM

## 2024-01-22 RX ORDER — METOCLOPRAMIDE HYDROCHLORIDE 5 MG/ML
5 INJECTION INTRAMUSCULAR; INTRAVENOUS EVERY 6 HOURS PRN
Status: DISCONTINUED | OUTPATIENT
Start: 2024-01-22 | End: 2024-01-23 | Stop reason: HOSPADM

## 2024-01-22 RX ORDER — ACETAMINOPHEN 650 MG/1
650 SUPPOSITORY RECTAL EVERY 4 HOURS PRN
Status: DISCONTINUED | OUTPATIENT
Start: 2024-01-22 | End: 2024-01-23 | Stop reason: HOSPADM

## 2024-01-22 RX ORDER — TALC
3 POWDER (GRAM) TOPICAL NIGHTLY PRN
Status: DISCONTINUED | OUTPATIENT
Start: 2024-01-22 | End: 2024-01-23 | Stop reason: HOSPADM

## 2024-01-22 RX ORDER — ACETAMINOPHEN 325 MG/1
650 TABLET ORAL EVERY 4 HOURS PRN
Status: DISCONTINUED | OUTPATIENT
Start: 2024-01-22 | End: 2024-01-23 | Stop reason: HOSPADM

## 2024-01-22 RX ORDER — PANTOPRAZOLE SODIUM 40 MG/1
40 TABLET, DELAYED RELEASE ORAL
Status: DISCONTINUED | OUTPATIENT
Start: 2024-01-23 | End: 2024-01-23 | Stop reason: HOSPADM

## 2024-01-22 RX ORDER — ONDANSETRON HYDROCHLORIDE 2 MG/ML
4 INJECTION, SOLUTION INTRAVENOUS EVERY 8 HOURS PRN
Status: DISCONTINUED | OUTPATIENT
Start: 2024-01-22 | End: 2024-01-23 | Stop reason: HOSPADM

## 2024-01-22 RX ORDER — METOCLOPRAMIDE 10 MG/1
5 TABLET ORAL EVERY 6 HOURS PRN
Status: DISCONTINUED | OUTPATIENT
Start: 2024-01-22 | End: 2024-01-23 | Stop reason: HOSPADM

## 2024-01-22 RX ORDER — PANTOPRAZOLE SODIUM 40 MG/10ML
40 INJECTION, POWDER, LYOPHILIZED, FOR SOLUTION INTRAVENOUS
Status: DISCONTINUED | OUTPATIENT
Start: 2024-01-23 | End: 2024-01-23 | Stop reason: HOSPADM

## 2024-01-22 RX ORDER — POLYETHYLENE GLYCOL 3350 17 G/17G
17 POWDER, FOR SOLUTION ORAL DAILY PRN
Status: DISCONTINUED | OUTPATIENT
Start: 2024-01-22 | End: 2024-01-23 | Stop reason: HOSPADM

## 2024-01-22 RX ADMIN — RIVAROXABAN 15 MG: 15 TABLET, FILM COATED ORAL at 23:34

## 2024-01-22 RX ADMIN — AMPICILLIN SODIUM AND SULBACTAM SODIUM 3 G: 2; 1 INJECTION, POWDER, FOR SOLUTION INTRAMUSCULAR; INTRAVENOUS at 20:57

## 2024-01-22 RX ADMIN — Medication 400 MG: at 20:06

## 2024-01-22 SDOH — SOCIAL STABILITY: SOCIAL INSECURITY: DO YOU FEEL UNSAFE GOING BACK TO THE PLACE WHERE YOU ARE LIVING?: NO

## 2024-01-22 SDOH — SOCIAL STABILITY: SOCIAL INSECURITY: DOES ANYONE TRY TO KEEP YOU FROM HAVING/CONTACTING OTHER FRIENDS OR DOING THINGS OUTSIDE YOUR HOME?: NO

## 2024-01-22 SDOH — SOCIAL STABILITY: SOCIAL INSECURITY: ABUSE: ADULT

## 2024-01-22 SDOH — SOCIAL STABILITY: SOCIAL INSECURITY: DO YOU FEEL ANYONE HAS EXPLOITED OR TAKEN ADVANTAGE OF YOU FINANCIALLY OR OF YOUR PERSONAL PROPERTY?: NO

## 2024-01-22 SDOH — SOCIAL STABILITY: SOCIAL INSECURITY: ARE YOU OR HAVE YOU BEEN THREATENED OR ABUSED PHYSICALLY, EMOTIONALLY, OR SEXUALLY BY ANYONE?: NO

## 2024-01-22 SDOH — SOCIAL STABILITY: SOCIAL INSECURITY: HAVE YOU HAD THOUGHTS OF HARMING ANYONE ELSE?: NO

## 2024-01-22 SDOH — SOCIAL STABILITY: SOCIAL INSECURITY: WERE YOU ABLE TO COMPLETE ALL THE BEHAVIORAL HEALTH SCREENINGS?: YES

## 2024-01-22 SDOH — SOCIAL STABILITY: SOCIAL INSECURITY: ARE THERE ANY APPARENT SIGNS OF INJURIES/BEHAVIORS THAT COULD BE RELATED TO ABUSE/NEGLECT?: NO

## 2024-01-22 SDOH — SOCIAL STABILITY: SOCIAL INSECURITY: HAS ANYONE EVER THREATENED TO HURT YOUR FAMILY OR YOUR PETS?: NO

## 2024-01-22 ASSESSMENT — ACTIVITIES OF DAILY LIVING (ADL)
LACK_OF_TRANSPORTATION: NO
ASSISTIVE_DEVICE: WALKER
HEARING - LEFT EAR: DIFFICULTY WITH NOISE
WALKS IN HOME: INDEPENDENT
JUDGMENT_ADEQUATE_SAFELY_COMPLETE_DAILY_ACTIVITIES: YES
PATIENT'S MEMORY ADEQUATE TO SAFELY COMPLETE DAILY ACTIVITIES?: YES
HEARING - RIGHT EAR: DIFFICULTY WITH NOISE
GROOMING: INDEPENDENT
FEEDING YOURSELF: INDEPENDENT
BATHING: INDEPENDENT
ADEQUATE_TO_COMPLETE_ADL: YES
DRESSING YOURSELF: INDEPENDENT
TOILETING: INDEPENDENT

## 2024-01-22 ASSESSMENT — LIFESTYLE VARIABLES
HOW MANY STANDARD DRINKS CONTAINING ALCOHOL DO YOU HAVE ON A TYPICAL DAY: PATIENT DOES NOT DRINK
REASON UNABLE TO ASSESS: NO
HAVE YOU EVER FELT YOU SHOULD CUT DOWN ON YOUR DRINKING: NO
AUDIT-C TOTAL SCORE: 0
HOW OFTEN DO YOU HAVE A DRINK CONTAINING ALCOHOL: NEVER
EVER FELT BAD OR GUILTY ABOUT YOUR DRINKING: NO
EVER HAD A DRINK FIRST THING IN THE MORNING TO STEADY YOUR NERVES TO GET RID OF A HANGOVER: NO
HAVE PEOPLE ANNOYED YOU BY CRITICIZING YOUR DRINKING: NO
AUDIT-C TOTAL SCORE: 0
SKIP TO QUESTIONS 9-10: 1
HOW OFTEN DO YOU HAVE 6 OR MORE DRINKS ON ONE OCCASION: NEVER

## 2024-01-22 ASSESSMENT — COGNITIVE AND FUNCTIONAL STATUS - GENERAL
DAILY ACTIVITIY SCORE: 24
WALKING IN HOSPITAL ROOM: A LITTLE
CLIMB 3 TO 5 STEPS WITH RAILING: A LITTLE
MOBILITY SCORE: 22
PATIENT BASELINE BEDBOUND: NO

## 2024-01-22 ASSESSMENT — COLUMBIA-SUICIDE SEVERITY RATING SCALE - C-SSRS
2. HAVE YOU ACTUALLY HAD ANY THOUGHTS OF KILLING YOURSELF?: NO
1. IN THE PAST MONTH, HAVE YOU WISHED YOU WERE DEAD OR WISHED YOU COULD GO TO SLEEP AND NOT WAKE UP?: NO
6. HAVE YOU EVER DONE ANYTHING, STARTED TO DO ANYTHING, OR PREPARED TO DO ANYTHING TO END YOUR LIFE?: NO

## 2024-01-22 ASSESSMENT — PAIN DESCRIPTION - LOCATION: LOCATION: LEG

## 2024-01-22 ASSESSMENT — PAIN SCALES - GENERAL
PAINLEVEL_OUTOF10: 0 - NO PAIN
PAINLEVEL_OUTOF10: 3
PAINLEVEL_OUTOF10: 0 - NO PAIN
PAINLEVEL_OUTOF10: 8

## 2024-01-22 ASSESSMENT — PAIN DESCRIPTION - ORIENTATION: ORIENTATION: LEFT;RIGHT

## 2024-01-22 ASSESSMENT — PAIN DESCRIPTION - PAIN TYPE: TYPE: ACUTE PAIN

## 2024-01-22 ASSESSMENT — PAIN - FUNCTIONAL ASSESSMENT: PAIN_FUNCTIONAL_ASSESSMENT: 0-10

## 2024-01-22 NOTE — ED PROVIDER NOTES
EMERGENCY DEPARTMENT ENCOUNTER      Pt Name: Hugo Weaver  MRN: [id number]  Birthdate 9/7/1931  Date of evaluation: 1/22/2024  Provider: Hola Mccarthy DO    CHIEF COMPLAINT       Chief Complaint   Patient presents with    Leg Swelling     Reports sent by mariana sent him for leg swelling. Is scheduled for pacemaker placement tomorrow.            HISTORY OF PRESENT ILLNESS    This is a 89-year-old male who arrives to the emergency department accompanied by his son after being sent in by his cardiologist, Dr. Jara for evaluation of bilateral lower leg swelling.  Patient states his left leg is warm to the touch and erythematous, patient states he was scheduled to have a pacemaker placed by Dr. Jara tomorrow however when he was seeing a cardiologist at [facility] he noted the bilateral leg swelling and informed Dr. Jara who wanted him evaluated in the emergency department for potential deep vein thrombosis versus cellulitis.  The patient has a history of permanent atrial fibrillation, chronic systolic heart failure with an EF of 35 to 40%, coronary artery disease status post CABG, CKD 3 and a left bundle branch block.  He has type 2 diabetes that is maintained by diet.  He has also had an aortic aneurysm repair.  Patient states that he has not taken Xarelto for the last 3 days due to the scheduled pacemaker implementation.  States also he has had a DVT approximately a year ago in the right leg.  Today he has complaint of bilateral leg swelling however the left leg is erythematous and weeping, he states the left leg is more swollen than the right.  Additionally states he has had cellulitis in the past twice and it was similar to this episode.  He denies chest pain denies shortness of breath, denies changes in bowel or urinary habits, denies blood in stool or urine.      History provided by:  Patient, relative and medical records      Nursing Notes were reviewed.    PAST MEDICAL HISTORY     Past  Medical History:   Diagnosis Date    Atrial fibrillation (CMS/HCC)     Cataract     Chronic kidney disease     Coronary artery disease     Diabetes mellitus (CMS/HCC)     Dyspnea, unspecified     Dyspnea    Heart disease     HL (hearing loss)     Hyperlipidemia     Hypertension     Ischemic cardiomyopathy     Other specified postprocedural states     History of endoscopy    Personal history of other diseases of the circulatory system     History of aortic valve insufficiency    Personal history of other diseases of the circulatory system     History of mitral valve insufficiency    Personal history of other diseases of the circulatory system     Personal history of coronary atherosclerosis    Personal history of other diseases of the digestive system 09/27/2018    History of biliary colic    Personal history of other endocrine, nutritional and metabolic disease     History of hyperlipidemia    Personal history of other endocrine, nutritional and metabolic disease     History of obesity    Personal history of other endocrine, nutritional and metabolic disease     History of type 1 diabetes mellitus    Personal history of other medical treatment     History of echocardiogram    Personal history of other specified conditions 12/30/2015    History of edema    Valvular heart disease          SURGICAL HISTORY       Past Surgical History:   Procedure Laterality Date    CHOLECYSTECTOMY  10/2018    CORONARY ARTERY BYPASS GRAFT  10/2005    CT ABDOMEN PELVIS ANGIOGRAM W AND/OR WO IV CONTRAST  03/18/2020    CT ABDOMEN PELVIS ANGIOGRAM W AND/OR WO IV CONTRAST 3/18/2020 [facility]    OTHER SURGICAL HISTORY  11/17/2014    Cardiac Catheter His Ablation    OTHER SURGICAL HISTORY  11/05/2018    Cholecystectomy laparoscopic    OTHER SURGICAL HISTORY  12/03/2019    Coronary artery bypass graft    OTHER SURGICAL HISTORY  07/25/2019    Gallbladder surgery         CURRENT MEDICATIONS       Discharge Medication List as of  1/23/2024  2:57 PM        CONTINUE these medications which have NOT CHANGED    Details   ascorbic acid (Vitamin C) 1,000 mg tablet Take 1 tablet (1,000 mg) by mouth once daily., Historical Med      !! azaTHIOprine (Imuran) 50 mg tablet Take 1 tablet (50mg) every morning and 1.5 tablets (75mg) every evening., Normal      bumetanide (Bumex) 2 mg tablet Take 1 tablet (2 mg) by mouth once daily as needed (swelling)., Historical Med      enalapril (Vasotec) 5 mg tablet Take 1 tablet (5 mg) by mouth once daily at bedtime., Starting Tue 11/21/2023, Until Wed 11/20/2024, Normal      omega 3-dha-epa-fish oil (Fish OiL) 1,000 mg (120 mg-180 mg) capsule Take 1 capsule (1,000 mg) by mouth once daily., Historical Med      pravastatin (Pravachol) 20 mg tablet Take 1 tablet (20 mg) by mouth once daily at bedtime., Starting Sat 12/30/2023, Until Sun 12/29/2024, Normal      predniSONE (Deltasone) 1 mg tablet Take 1 tablet (1 mg) by mouth once daily., Starting Tue 11/28/2023, Until Wed 11/27/2024, Normal      rivaroxaban (Xarelto) 15 mg tablet Take 1 tablet (15 mg) by mouth once daily in the evening. Take with meals. Take with food., Historical Med      spironolactone (Aldactone) 25 mg tablet Take 1 tablet (25 mg) by mouth once daily., Starting Sat 12/30/2023, Until Sun 12/29/2024, Normal      tamsulosin (Flomax) 0.4 mg 24 hr capsule Take 1 capsule (0.4 mg) by mouth once daily at bedtime., Historical Med      vitamin E 180 mg (400 unit) capsule Take 1 capsule (400 Units) by mouth once daily., Historical Med      !! azaTHIOprine (Imuran) 50 mg tablet Take 1.5 tablets (75 mg) by mouth once daily at bedtime., Historical Med       !! - Potential duplicate medications found. Please discuss with provider.          ALLERGIES     Atorvastatin and Simvastatin    FAMILY HISTORY       Family History   Problem Relation Name Age of Onset    Diabetes Mother Clemencia     Other (Cardiac disorder) Mother Clemencia     Heart disease Mother Clemencia      Hypertension Mother Clemencia     Arthritis Mother Clemencia     Diabetes type II Mother Clemencia     No Known Problems Father            SOCIAL HISTORY       Social History     Socioeconomic History    Marital status:      Spouse name: None    Number of children: None    Years of education: None    Highest education level: None   Occupational History    None   Tobacco Use    Smoking status: Never    Smokeless tobacco: Never   Substance and Sexual Activity    Alcohol use: Not Currently    Drug use: Never    Sexual activity: Never   Other Topics Concern    None   Social History Narrative    None     Social Determinants of Health     Financial Resource Strain: Low Risk  (1/23/2024)    Overall Financial Resource Strain (CARDIA)     Difficulty of Paying Living Expenses: Not hard at all   Food Insecurity: Not on file   Transportation Needs: No Transportation Needs (1/23/2024)    PRAPARE - Transportation     Lack of Transportation (Medical): No     Lack of Transportation (Non-Medical): No   Physical Activity: Not on file   Stress: Not on file   Social Connections: Not on file   Intimate Partner Violence: Not on file   Housing Stability: Low Risk  (1/23/2024)    Housing Stability Vital Sign     Unable to Pay for Housing in the Last Year: No     Number of Places Lived in the Last Year: 1     Unstable Housing in the Last Year: No       SCREENINGS                        PHYSICAL EXAM    (up to 7 for level 4, 8 or more for level 5)     ED Triage Vitals [01/22/24 1706]   Temp Heart Rate Respirations BP   36.5 °C (97.7 °F) 58 18 162/71      Pulse Ox Temp Source Heart Rate Source Patient Position   97 % Temporal Monitor Sitting      BP Location FiO2 (%)     Right arm --       Physical Exam  Vitals and nursing note reviewed.   Constitutional:       General: He is not in acute distress.     Appearance: Normal appearance. He is normal weight. He is not ill-appearing, toxic-appearing or diaphoretic.   HENT:      Head: Normocephalic and  atraumatic.      Nose: Nose normal.      Mouth/Throat:      Mouth: Mucous membranes are moist.      Pharynx: Oropharynx is clear. Posterior oropharyngeal erythema present. No oropharyngeal exudate.   Eyes:      Extraocular Movements: Extraocular movements intact.      Conjunctiva/sclera: Conjunctivae normal.      Pupils: Pupils are equal, round, and reactive to light.   Cardiovascular:      Rate and Rhythm: Normal rate. Rhythm irregular.      Pulses: Normal pulses.      Heart sounds: Normal heart sounds. No murmur heard.     No friction rub. No gallop.   Pulmonary:      Effort: Pulmonary effort is normal. No respiratory distress.      Breath sounds: Normal breath sounds. No stridor. No wheezing, rhonchi or rales.   Abdominal:      General: Abdomen is flat. Bowel sounds are normal. There is no distension.      Palpations: Abdomen is soft.      Tenderness: There is no abdominal tenderness. There is no guarding or rebound.   Musculoskeletal:         General: No swelling or deformity. Normal range of motion.      Cervical back: Normal range of motion.      Right lower le+ Pitting Edema present.      Left lower leg: Pitting Edema present.        Legs:    Skin:     General: Skin is warm.      Capillary Refill: Capillary refill takes less than 2 seconds.      Findings: Erythema and rash present.   Neurological:      General: No focal deficit present.      Mental Status: He is alert and oriented to person, place, and time.   Psychiatric:         Mood and Affect: Mood normal.         Behavior: Behavior normal.         Thought Content: Thought content normal.         Judgment: Judgment normal.          DIAGNOSTIC RESULTS     LABS:  Labs Reviewed   CBC WITH AUTO DIFFERENTIAL - Abnormal       Result Value    WBC 7.1      nRBC 0.0      RBC 4.26 (*)     Hemoglobin 13.3 (*)     Hematocrit 41.1      MCV 97      MCH 31.2      MCHC 32.4      RDW 14.3      Platelets 186      Neutrophils % 67.2      Immature Granulocytes %,  Automated 1.1 (*)     Lymphocytes % 12.1      Monocytes % 12.0      Eosinophils % 6.9      Basophils % 0.7      Neutrophils Absolute 4.75      Immature Granulocytes Absolute, Automated 0.08      Lymphocytes Absolute 0.86      Monocytes Absolute 0.85 (*)     Eosinophils Absolute 0.49 (*)     Basophils Absolute 0.05     COMPREHENSIVE METABOLIC PANEL - Abnormal    Glucose 123 (*)     Sodium 137      Potassium 4.5      Chloride 102      Bicarbonate 25      Anion Gap 15      Urea Nitrogen 30 (*)     Creatinine 1.49 (*)     eGFR 44 (*)     Calcium 9.6      Albumin 4.3      Alkaline Phosphatase 39      Total Protein 6.8      AST 13      Bilirubin, Total 0.7      ALT 9 (*)    MAGNESIUM - Abnormal    Magnesium 1.52 (*)    B-TYPE NATRIURETIC PEPTIDE - Abnormal     (*)     Narrative:        <100 pg/mL - Heart failure unlikely  100-299 pg/mL - Intermediate probability of acute heart                  failure exacerbation. Correlate with clinical                  context and patient history.    >=300 pg/mL - Heart Failure likely. Correlate with clinical                  context and patient history.    BNP testing is performed using different testing methodology at [facility] than at other [facility] hospitals. Direct result comparisons should only be made within the same method.      CBC - Abnormal    WBC 6.4      nRBC 0.0      RBC 3.81 (*)     Hemoglobin 11.8 (*)     Hematocrit 37.0 (*)     MCV 97      MCH 31.0      MCHC 31.9 (*)     RDW 14.3      Platelets 164     COMPREHENSIVE METABOLIC PANEL - Abnormal    Glucose 116 (*)     Sodium 140      Potassium 3.9      Chloride 108 (*)     Bicarbonate 22      Anion Gap 14      Urea Nitrogen 27 (*)     Creatinine 1.28      eGFR 53 (*)     Calcium 8.9      Albumin 3.6      Alkaline Phosphatase 30 (*)     Total Protein 5.6 (*)     AST 11      Bilirubin, Total 0.7      ALT 8 (*)    TROPONIN I, HIGH SENSITIVITY - Normal    Troponin I, High Sensitivity 14      Narrative:      Less than 99th percentile of normal range cutoff-  Female and children under 18 years old <14 ng/L; Male <21 ng/L: Negative  Repeat testing should be performed if clinically indicated.     Female and children under 18 years old 14-50 ng/L; Male 21-50 ng/L:  Consistent with possible cardiac damage and possible increased clinical   risk. Serial measurements may help to assess extent of myocardial damage.     >50 ng/L: Consistent with cardiac damage, increased clinical risk and  myocardial infarction. Serial measurements may help assess extent of   myocardial damage.      NOTE: Children less than 1 year old may have higher baseline troponin   levels and results should be interpreted in conjunction with the overall   clinical context.     NOTE: Troponin I testing is performed using a different   testing methodology at [facility] than at other   [facility]. Direct result comparisons should only   be made within the same method.   LACTATE - Normal    Lactate 1.3      Narrative:     Venipuncture immediately after or during the administration of Metamizole may lead to falsely low results. Testing should be performed immediately  prior to Metamizole dosing.   C-REACTIVE PROTEIN - Normal    C-Reactive Protein 0.55         All other labs were within normal range or not returned as of this dictation.    Imaging  Vascular US lower extremity venous duplex bilateral   Final Result      XR chest 1 view   Final Result   No acute cardiopulmonary process.        MACRO:   None        Signed by: Vania Jarrell 1/22/2024 7:49 PM   Dictation workstation:   [id number]           Procedures  Procedures     EMERGENCY DEPARTMENT COURSE/MDM:     ED Course as of 01/27/24 1906 Mon Jan 22, 2024 1949 The patient's ultrasound of the lower extremities showed no deep vein thrombosis.  Patient had a mostly normal CMP no acute electrolyte dyscrasia, patient does have a history of CKD 3, he has a creatinine of 1.49 and a GFR of  44 these are consistent with findings of CKD 3. [PV]   1950 BNP was elevated at 259, consistent with the patient's history of chronic systolic heart failure.  CBC was unremarkable no leukocytosis to indicate a systemic infection.  Hemoglobin and hematocrit were stable at 13.3 and 41.1.  Patient's troponin was negative at 14.  Mild hypomagnesemia at 1.5 to versus normal at 1.6. [PV]   1951 Chest x-ray showed no pulmonary edema, no acute cardiopulmonary process. [PV]   1957 Patient's magnesium was replaced via oral tablet p.o.  Patient was offered admission to the hospital as last time he had cellulitis he ultimately required hospitalization due to poor tolerance of outpatient antibiotics.  Patient states at this time he will consider it with his son. [PV]   2043 The patient and his son state they believe it would be best if the patient stays overnight for IV antibiotic biotic therapy, pharmacy consult.  Patient was started on 3 g of Unasyn as this is what he was treated with on his last admission in November. [PV]      ED Course User Index  [PV] Chung Covarrubias DO         Diagnoses as of 01/27/24 1906   Cellulitis of left lower extremity        Medical Decision Making  Patient has no complaint of pain at this time.  A CBC, CMP, troponin, BNP were ordered to evaluate for signs of infection versus signs of fluid overload.  Twelve-lead EKG for evaluation of patient's underlying rhythm, troponin ordered to evaluate for cardiac involvement.  Chest x-ray to evaluate for fluid overload.  Ultrasound Doppler of the bilateral lower extremities ordered for evaluation of deep vein thrombosis.        Patient and or family in agreement and understanding of treatment plan.  All questions answered.      I reviewed the case with the attending ED physician. The attending ED physician agrees with the plan. Patient and/or patient´s representative was counseled regarding labs, imaging, likely diagnosis, and plan. All questions were  answered.    ED Medications administered this visit:    Medications   magnesium oxide (Mag-Ox) tablet 400 mg (400 mg oral Given 1/22/24 2006)   ampicillin-sulbactam (Unasyn) in sodium chloride 0.9 % 100 mL 3 g (0 g intravenous Stopped 1/22/24 2127)       New Prescriptions from this visit:    Discharge Medication List as of 1/23/2024  2:57 PM        START taking these medications    Details   amoxicillin-pot clavulanate (Augmentin) 875-125 mg tablet Take 1 tablet (875 mg) by mouth 2 times a day for 14 days., Starting Tue 1/23/2024, Until Tue 2/6/2024, Normal             Follow-up:  Mehrdad Barton DO  [address] [facility] [id number] [phone]    Follow up      Mehrdad Barton DO  [address] [facility] [id number] [phone]          Andrez Gibbons MD  [address] [facility] [address]  [phone]    Call in 1 day(s)          Final Impression:   1. Localized edema    2. Cellulitis of left lower extremity    3. Cellulitis of left lower limb          (Please note that portions of this note were completed with a voice recognition program.  Efforts were made to edit the dictations but occasionally words are mis-transcribed.)     Chung Covarrubias DO  Resident  01/22/24 2045    The patient was seen by the resident/fellow.  I have personally performed a substantive portion of the encounter.  I have seen and examined the patient; agree with the workup, evaluation, MDM, management and diagnosis.  The care plan has been discussed with the resident; I have reviewed the resident’s note and agree with the documented findings.       Hola Mccarthy DO  01/27/24 1906

## 2024-01-22 NOTE — TELEPHONE ENCOUNTER
Patient called and he is supposed to have a pacemaker surgwry at 1:00 and dr smith wants to know if you Ultra  sound on his left leg because his left leg is warm swollen please advise     [phone]

## 2024-01-23 VITALS
DIASTOLIC BLOOD PRESSURE: 71 MMHG | HEART RATE: 63 BPM | OXYGEN SATURATION: 98 % | WEIGHT: 168 LBS | BODY MASS INDEX: 22.75 KG/M2 | RESPIRATION RATE: 16 BRPM | HEIGHT: 72 IN | SYSTOLIC BLOOD PRESSURE: 145 MMHG | TEMPERATURE: 97.3 F

## 2024-01-23 PROBLEM — L03.116 CELLULITIS OF LEFT LOWER EXTREMITY: Status: RESOLVED | Noted: 2024-01-22 | Resolved: 2024-01-23

## 2024-01-23 PROBLEM — L03.116 CELLULITIS OF LEFT LOWER LIMB: Status: RESOLVED | Noted: 2023-07-18 | Resolved: 2024-01-23

## 2024-01-23 LAB
ALBUMIN SERPL BCP-MCNC: 3.6 G/DL (ref 3.4–5)
ALP SERPL-CCNC: 30 U/L (ref 33–136)
ALT SERPL W P-5'-P-CCNC: 8 U/L (ref 10–52)
ANION GAP SERPL CALC-SCNC: 14 MMOL/L (ref 10–20)
AST SERPL W P-5'-P-CCNC: 11 U/L (ref 9–39)
BILIRUB SERPL-MCNC: 0.7 MG/DL (ref 0–1.2)
BUN SERPL-MCNC: 27 MG/DL (ref 6–23)
CALCIUM SERPL-MCNC: 8.9 MG/DL (ref 8.6–10.3)
CHLORIDE SERPL-SCNC: 108 MMOL/L (ref 98–107)
CO2 SERPL-SCNC: 22 MMOL/L (ref 21–32)
CREAT SERPL-MCNC: 1.28 MG/DL (ref 0.5–1.3)
CRP SERPL-MCNC: 0.55 MG/DL
EGFRCR SERPLBLD CKD-EPI 2021: 53 ML/MIN/1.73M*2
ERYTHROCYTE [DISTWIDTH] IN BLOOD BY AUTOMATED COUNT: 14.3 % (ref 11.5–14.5)
GLUCOSE SERPL-MCNC: 116 MG/DL (ref 74–99)
HCT VFR BLD AUTO: 37 % (ref 41–52)
HGB BLD-MCNC: 11.8 G/DL (ref 13.5–17.5)
MCH RBC QN AUTO: 31 PG (ref 26–34)
MCHC RBC AUTO-ENTMCNC: 31.9 G/DL (ref 32–36)
MCV RBC AUTO: 97 FL (ref 80–100)
NRBC BLD-RTO: 0 /100 WBCS (ref 0–0)
PLATELET # BLD AUTO: 164 X10*3/UL (ref 150–450)
POTASSIUM SERPL-SCNC: 3.9 MMOL/L (ref 3.5–5.3)
PROT SERPL-MCNC: 5.6 G/DL (ref 6.4–8.2)
RBC # BLD AUTO: 3.81 X10*6/UL (ref 4.5–5.9)
SODIUM SERPL-SCNC: 140 MMOL/L (ref 136–145)
WBC # BLD AUTO: 6.4 X10*3/UL (ref 4.4–11.3)

## 2024-01-23 PROCEDURE — 85027 COMPLETE CBC AUTOMATED: CPT | Performed by: INTERNAL MEDICINE

## 2024-01-23 PROCEDURE — 2500000001 HC RX 250 WO HCPCS SELF ADMINISTERED DRUGS (ALT 637 FOR MEDICARE OP)

## 2024-01-23 PROCEDURE — G0378 HOSPITAL OBSERVATION PER HR: HCPCS

## 2024-01-23 PROCEDURE — 86140 C-REACTIVE PROTEIN: CPT

## 2024-01-23 PROCEDURE — 36415 COLL VENOUS BLD VENIPUNCTURE: CPT | Performed by: INTERNAL MEDICINE

## 2024-01-23 PROCEDURE — 80053 COMPREHEN METABOLIC PANEL: CPT | Performed by: INTERNAL MEDICINE

## 2024-01-23 PROCEDURE — 99238 HOSP IP/OBS DSCHRG MGMT 30/<: CPT

## 2024-01-23 PROCEDURE — 2500000004 HC RX 250 GENERAL PHARMACY W/ HCPCS (ALT 636 FOR OP/ED): Performed by: INTERNAL MEDICINE

## 2024-01-23 RX ORDER — AMOXICILLIN AND CLAVULANATE POTASSIUM 875; 125 MG/1; MG/1
875 TABLET, FILM COATED ORAL EVERY 12 HOURS SCHEDULED
Status: DISCONTINUED | OUTPATIENT
Start: 2024-01-23 | End: 2024-01-23 | Stop reason: HOSPADM

## 2024-01-23 RX ORDER — AMOXICILLIN AND CLAVULANATE POTASSIUM 875; 125 MG/1; MG/1
875 TABLET, FILM COATED ORAL 2 TIMES DAILY
Qty: 28 TABLET | Refills: 0 | Status: SHIPPED | OUTPATIENT
Start: 2024-01-23 | End: 2024-02-06

## 2024-01-23 RX ADMIN — AMOXICILLIN AND CLAVULANATE POTASSIUM 875 MG: 875; 125 TABLET, FILM COATED ORAL at 10:46

## 2024-01-23 RX ADMIN — PANTOPRAZOLE SODIUM 40 MG: 40 TABLET, DELAYED RELEASE ORAL at 06:43

## 2024-01-23 RX ADMIN — AMPICILLIN SODIUM AND SULBACTAM SODIUM 3 G: 2; 1 INJECTION, POWDER, FOR SOLUTION INTRAMUSCULAR; INTRAVENOUS at 08:34

## 2024-01-23 ASSESSMENT — COGNITIVE AND FUNCTIONAL STATUS - GENERAL
WALKING IN HOSPITAL ROOM: A LITTLE
MOBILITY SCORE: 22
CLIMB 3 TO 5 STEPS WITH RAILING: A LITTLE
DAILY ACTIVITIY SCORE: 24

## 2024-01-23 ASSESSMENT — PAIN SCALES - GENERAL: PAINLEVEL_OUTOF10: 0 - NO PAIN

## 2024-01-23 ASSESSMENT — ACTIVITIES OF DAILY LIVING (ADL): LACK_OF_TRANSPORTATION: NO

## 2024-01-23 NOTE — DISCHARGE SUMMARY
Discharge Diagnosis  Cellulitis of left lower extremity    Issues Requiring Follow-Up  Follow up with cardiology to reschedule pacemaker procedure    Resume Xarelto until procedure rescheduled    Discharge Meds     Your medication list        START taking these medications        Instructions Last Dose Given Next Dose Due   amoxicillin-pot clavulanate 875-125 mg tablet  Commonly known as: Augmentin      Take 1 tablet (875 mg) by mouth 2 times a day for 14 days.              CHANGE how you take these medications        Instructions Last Dose Given Next Dose Due   azaTHIOprine 50 mg tablet  Commonly known as: Imuran  What changed: Another medication with the same name was changed. Make sure you understand how and when to take each.           azaTHIOprine 50 mg tablet  Commonly known as: Imuran  What changed:   how much to take  how to take this  when to take this      Take 1 tablet (50mg) every morning and 1.5 tablets (75mg) every evening.              CONTINUE taking these medications        Instructions Last Dose Given Next Dose Due   ascorbic acid 1,000 mg tablet  Commonly known as: Vitamin C           bumetanide 2 mg tablet  Commonly known as: Bumex           enalapril 5 mg tablet  Commonly known as: Vasotec      Take 1 tablet (5 mg) by mouth once daily at bedtime.       Fish OiL 1,000 mg (120 mg-180 mg) capsule  Generic drug: omega 3-dha-epa-fish oil           pravastatin 20 mg tablet  Commonly known as: Pravachol      Take 1 tablet (20 mg) by mouth once daily at bedtime.       predniSONE 1 mg tablet  Commonly known as: Deltasone      Take 1 tablet (1 mg) by mouth once daily.       rivaroxaban 15 mg tablet  Commonly known as: Xarelto           spironolactone 25 mg tablet  Commonly known as: Aldactone      Take 1 tablet (25 mg) by mouth once daily.       tamsulosin 0.4 mg 24 hr capsule  Commonly known as: Flomax           vitamin E 180 mg (400 unit) capsule                     Where to Get Your Medications         These medications were sent to [id number] - [place], [place] - [address]  [address], [facility] [address]      Phone: [phone]   amoxicillin-pot clavulanate 875-125 mg tablet         Test Results Pending At Discharge  Pending Labs       No current pending labs.            Hospital Course  89 YOM pmh Afib slow ventricular response presenting to [facility] ED C/O of cellulitis LLE.  He had a planned procedure on 01/23/24 for pacemaker placement but was cancelled.  On exam had mild LLE, non-purulant cellulitis.  There was concern he had an allergic reaction previously to PO beta-lactams. Originally on unasysn overnight, cellulitis improved, transitioned to augmentin in AM, monitored for 8 hours without reaction.  Discharged with 14 day course of augmentin.  Held xarelto for 48 hours prior to presenting due to planned procedure. Instructed to resume Xaralto until procedure rescheduled.  Needs follow up with cardiology and PCP.    Pertinent Physical Exam At Time of Discharge  Physical Exam  Constitutional: A&Ox4, NAD, resting comfortable   Head and Face: Atraumatic, normocephalic   Eyes: Normal external exam, EOMI  ENT: Normal external inspection of ears and nose. Oropharynx normal.  Cardiovascular: irregular-irregular rhythm S1/S2, no murmurs, rubs, or gallops, radial pulses +2  Pulmonary: CTAB, no respiratory distress, no wheezing, rales or rhonchi, on RA  Abdomen: +BS, soft, non-tender, nondistended, no guarding rigidity or rebound tenderness, no masses noted  MSK: Negative for edema, No joint swelling, normal movements of all extremities.   LLE: mild erythema starting at ankle extending to mid tibia, trace edema, no purulence or skin breakdown    Neuro: No focal deficits, normal motor function, normal sensation, follows all commands  Skin- No lesions, contusions, or erythema.  Psychiatric: Judgment intact. Appropriate mood, affect and behavior   Outpatient Follow-Up  Future Appointments    Date Time Provider Department Center   3/5/2024 11:40 AM Guanakito Kang MD PhD [id number]   5/14/2024 11:15 AM Jef Jara MD [id number] [place]   11/13/2024  2:00 PM Mehrdad Barton DO [id number] [place]         Lance Manley DO

## 2024-01-23 NOTE — H&P
History Of Present Illness  Hugo Weaver is a 89 y.o. male presenting with concern for possible lower extremity DVT.  Patient is noted to be on Xarelto for atrial fibrillation with slow ventricular response.  He has been off of this medication for the last 48 hours due to a planned cardiac procedure/pacemaker implantation.  Acutely, patient has experienced increased swelling, redness, and warmth of his left lower extremity.  Patient does have a history of recurrent cellulitis which she states may present in a similar fashion.  However he contacted his outpatient electrophysiologist and cardiologist who recommended due to him having recently suspended his Xarelto, to be seen in the emergency department for evaluation of possible DVT.  Upon arrival to this facility, patient does demonstrate unilateral left lower extremity swelling.  However venous duplex obtained at this time shows no evidence of acute DVT.  Findings most consistent with unilateral lower extremity cellulitis.  Admitted for further management.     Past Medical History  Past Medical History:   Diagnosis Date    Atrial fibrillation (CMS/HCC)     Cataract     Chronic kidney disease     Coronary artery disease     Diabetes mellitus (CMS/HCC)     Dyspnea, unspecified     Dyspnea    Heart disease     HL (hearing loss)     Hyperlipidemia     Hypertension     Ischemic cardiomyopathy     Other specified postprocedural states     History of endoscopy    Personal history of other diseases of the circulatory system     History of aortic valve insufficiency    Personal history of other diseases of the circulatory system     History of mitral valve insufficiency    Personal history of other diseases of the circulatory system     Personal history of coronary atherosclerosis    Personal history of other diseases of the digestive system 09/27/2018    History of biliary colic    Personal history of other endocrine, nutritional and metabolic disease     History of  hyperlipidemia    Personal history of other endocrine, nutritional and metabolic disease     History of obesity    Personal history of other endocrine, nutritional and metabolic disease     History of type 1 diabetes mellitus    Personal history of other medical treatment     History of echocardiogram    Personal history of other specified conditions 12/30/2015    History of edema    Valvular heart disease        Surgical History  Past Surgical History:   Procedure Laterality Date    CHOLECYSTECTOMY  10/2018    CORONARY ARTERY BYPASS GRAFT  10/2005    CT ABDOMEN PELVIS ANGIOGRAM W AND/OR WO IV CONTRAST  03/18/2020    CT ABDOMEN PELVIS ANGIOGRAM W AND/OR WO IV CONTRAST 3/18/2020 [facility]    OTHER SURGICAL HISTORY  11/17/2014    Cardiac Catheter His Ablation    OTHER SURGICAL HISTORY  11/05/2018    Cholecystectomy laparoscopic    OTHER SURGICAL HISTORY  12/03/2019    Coronary artery bypass graft    OTHER SURGICAL HISTORY  07/25/2019    Gallbladder surgery        Social History  He reports that he has never smoked. He has never used smokeless tobacco. He reports that he does not currently use alcohol. He reports that he does not use drugs.    Family History  Family History   Problem Relation Name Age of Onset    Diabetes Mother Clemencia     Other (Cardiac disorder) Mother Clemencia     Heart disease Mother Clemencia     Hypertension Mother Clemencia     Arthritis Mother Clemencia     No Known Problems Father          Allergies  Atorvastatin and Simvastatin    Review of Systems   All other systems reviewed and are negative.       Physical Exam  Vitals reviewed.   Constitutional:       Appearance: Normal appearance.   HENT:      Head: Normocephalic and atraumatic.      Nose: Nose normal.      Mouth/Throat:      Mouth: Mucous membranes are moist.   Eyes:      Extraocular Movements: Extraocular movements intact.      Conjunctiva/sclera: Conjunctivae normal.      Pupils: Pupils are equal, round, and reactive to light.   Cardiovascular:  "     Rate and Rhythm: Normal rate and regular rhythm.      Pulses: Normal pulses.      Heart sounds: Normal heart sounds.   Pulmonary:      Effort: Pulmonary effort is normal.      Breath sounds: Normal breath sounds.   Abdominal:      General: Bowel sounds are normal.      Palpations: Abdomen is soft.   Musculoskeletal:         General: Normal range of motion.      Cervical back: Normal range of motion and neck supple.   Skin:     General: Skin is warm and dry.      Findings: Erythema, lesion and rash present.   Neurological:      General: No focal deficit present.      Mental Status: He is alert. Mental status is at baseline.   Psychiatric:         Mood and Affect: Mood normal.         Behavior: Behavior normal.          Last Recorded Vitals  Blood pressure 172/65, pulse 59, temperature 36.5 °C (97.7 °F), temperature source Temporal, resp. rate 19, height 1.854 m (6' 1\"), weight 77.1 kg (170 lb), SpO2 99 %.    Relevant Results  Scheduled medications  ampicillin-sulbactam, 3 g, intravenous, q6h  pantoprazole, 40 mg, oral, Daily before breakfast   Or  pantoprazole, 40 mg, intravenous, Daily before breakfast  rivaroxaban, 15 mg, oral, Daily with evening meal      Continuous medications     PRN medications  PRN medications: acetaminophen **OR** acetaminophen **OR** acetaminophen, acetaminophen **OR** acetaminophen **OR** acetaminophen, melatonin, metoclopramide **OR** metoclopramide, ondansetron ODT **OR** ondansetron, polyethylene glycol  XR chest 1 view    Result Date: 1/22/2024  Interpreted By:  Vania Jarrell, STUDY: XR CHEST 1 VIEW;  1/22/2024 7:17 pm   INDICATION: Signs/Symptoms:Bilateral leg swelling.   COMPARISON: 11/02/2023   ACCESSION NUMBER(S): [id number]   ORDERING CLINICIAN: SABRINA WILLAMS   FINDINGS:     CARDIOMEDIASTINAL SILHOUETTE: Stable cardiomegaly. Post CABG changes noted.   LUNGS: No pulmonary consolidation, pleural effusion or pneumothorax. Calcified right hilar lymph nodes and calcified right " lung nodules, consistent with prior granulomatous disease.   ABDOMEN: No remarkable upper abdominal findings.   BONES: No acute osseous abnormality.       No acute cardiopulmonary process.   MACRO: None   Signed by: Vania Jarrell 1/22/2024 7:49 PM Dictation workstation:   [id number]    Vascular US lower extremity venous duplex bilateral    Result Date: 1/22/2024  Preliminary Cardiology Report            [facility] [address] [place] [address]     Tel [phone] Fax [phone]          Preliminary Vascular Lab Report  VASC US LOWER EXTREMITY VENOUS DUPLEX BILATERAL  Patient Name:     VIPUL MORTENSEN ANAHI Reading Physician:  [id number] Jennyfer Bo MD Study Date:       1/22/2024       Ordering Provider:  [id number] MICHA JENNINGS MRN/PID:          [id number]        Fellow: Accession#:       [id number]    Technologist:       Esvin EPPERSON RVT YOB: 1931        Technologist 2: Gender:           M               Encounter#:         [id number] Admission Status: Emergency       Location Performed: [facility]  Diagnosis/ICD: Localized (leg) edema-R60.0 CPT Codes:     53680 Peripheral venous duplex scan for DVT complete  PRELIMINARY CONCLUSIONS:  Right Lower Venous: No evidence of acute deep vein thrombus visualized in the right lower extremity. Additional Findings; Cystic Structure Right popliteal fossa baker's cyst ~ 4.6 x 2.3cm. Left Lower Venous: No evidence of acute deep vein thrombus visualized in the left lower extremity.  Imaging & Doppler Findings:  Right Compress Thrombus SFJ     Yes      None  Left Compress Thrombus SFJ    Yes      None  Right                 Compressible Thrombus        Flow Distal External Iliac     Yes        None   Spontaneous/Phasic CFV                       Yes        None   Spontaneous/Phasic PFV                       Yes        None FV Proximal               Yes        None   Spontaneous/Phasic FV Mid                    Yes        None FV  Distal                 Yes        None Popliteal                 Yes        None   Spontaneous/Phasic Peroneal                  Yes        None PTV                       Yes        None  Left                  Compress Thrombus        Flow Distal External Iliac   Yes      None   Spontaneous/Phasic CFV                     Yes      None   Spontaneous/Phasic PFV                     Yes      None FV Proximal             Yes      None   Spontaneous/Phasic FV Mid                  Yes      None FV Distal               Yes      None Popliteal               Yes      None   Spontaneous/Phasic Peroneal                Yes      None PTV                     Yes      None VASCULAR PRELIMINARY REPORT completed by Esvin EPPERSON RVT on 1/22/2024 at 7:48:41 PM  ** Final **     ECG 12 lead (Clinic Performed)    Result Date: 1/18/2024  Atrial fibrillation, LBBB, HR 70bpm, frequent PVCs   Results for orders placed or performed during the hospital encounter of 01/22/24 (from the past 24 hour(s))   CBC and Auto Differential   Result Value Ref Range    WBC 7.1 4.4 - 11.3 x10*3/uL    nRBC 0.0 0.0 - 0.0 /100 WBCs    RBC 4.26 (L) 4.50 - 5.90 x10*6/uL    Hemoglobin 13.3 (L) 13.5 - 17.5 g/dL    Hematocrit 41.1 41.0 - 52.0 %    MCV 97 80 - 100 fL    MCH 31.2 26.0 - 34.0 pg    MCHC 32.4 32.0 - 36.0 g/dL    RDW 14.3 11.5 - 14.5 %    Platelets 186 150 - 450 x10*3/uL    Neutrophils % 67.2 40.0 - 80.0 %    Immature Granulocytes %, Automated 1.1 (H) 0.0 - 0.9 %    Lymphocytes % 12.1 13.0 - 44.0 %    Monocytes % 12.0 2.0 - 10.0 %    Eosinophils % 6.9 0.0 - 6.0 %    Basophils % 0.7 0.0 - 2.0 %    Neutrophils Absolute 4.75 1.60 - 5.50 x10*3/uL    Immature Granulocytes Absolute, Automated 0.08 0.00 - 0.50 x10*3/uL    Lymphocytes Absolute 0.86 0.80 - 3.00 x10*3/uL    Monocytes Absolute 0.85 (H) 0.05 - 0.80 x10*3/uL    Eosinophils Absolute 0.49 (H) 0.00 - 0.40 x10*3/uL    Basophils Absolute 0.05 0.00 - 0.10 x10*3/uL   Comprehensive metabolic panel    Result Value Ref Range    Glucose 123 (H) 74 - 99 mg/dL    Sodium 137 136 - 145 mmol/L    Potassium 4.5 3.5 - 5.3 mmol/L    Chloride 102 98 - 107 mmol/L    Bicarbonate 25 21 - 32 mmol/L    Anion Gap 15 10 - 20 mmol/L    Urea Nitrogen 30 (H) 6 - 23 mg/dL    Creatinine 1.49 (H) 0.50 - 1.30 mg/dL    eGFR 44 (L) >60 mL/min/1.73m*2    Calcium 9.6 8.6 - 10.3 mg/dL    Albumin 4.3 3.4 - 5.0 g/dL    Alkaline Phosphatase 39 33 - 136 U/L    Total Protein 6.8 6.4 - 8.2 g/dL    AST 13 9 - 39 U/L    Bilirubin, Total 0.7 0.0 - 1.2 mg/dL    ALT 9 (L) 10 - 52 U/L   Magnesium   Result Value Ref Range    Magnesium 1.52 (L) 1.60 - 2.40 mg/dL   Troponin I, High Sensitivity   Result Value Ref Range    Troponin I, High Sensitivity 14 0 - 20 ng/L   B-Type Natriuretic Peptide   Result Value Ref Range     (H) 0 - 99 pg/mL   Lactate   Result Value Ref Range    Lactate 1.3 0.4 - 2.0 mmol/L       Assessment/Plan   Principal Problem:    Cellulitis of leg, left  Active Problems:    Atrial fibrillation (CMS/[facility])    Chronic kidney disease, stage III (moderate) (CMS/[facility])    Cellulitis of left lower limb      Patient presents to this facility with concern for unilateral left lower extremity swelling having recently suspended his Xarelto due to a planned surgical procedure.  Per ED report at the time of admission, this procedure has been postponed due to his need for inpatient evaluation.     Left lower extremity erythematous with no evidence of purulent drainage or discharge.  In discussion with patient's family, they did note that the patient has had difficulty tolerating oral formulations of many antibiotics but tends to tolerate the IV formulation.  Specifically they note difficult time tolerating amoxicillin and Keflex.  Patient was started on IV Unasyn in the emergency department which he appears to be tolerating without difficulty.  We will continue at this time.    Patient's cellulitis, in the absence of acute worsening in the next 12  hours, may be appropriate to treat in the outpatient setting given the lack of alarm features such as high white blood cell count, fever/chills, or other signs of systemic illness.  He has not been on oral antibiotics for this current infection.  And even though he reports intolerance to amoxicillin and Keflex, could consider other alternatives such as doxycycline, Bactrim, or possibly Levaquin.    PT/OT evaluation to be obtained    Home medications to be reviewed and resumed as indicated    Cardiac/low-salt diet     I spent >75 minutes in the professional and overall care of this patient.      Brad Cervantes, DO

## 2024-01-23 NOTE — PROGRESS NOTES
Met with patient at the bedside. Confirmed address and contacts. Patient lives at home alone and has help from his daughter, son, and DIL. He does not drive, family provides transportation. Uses [employer] and is independent with all care and ADLs. Manages his own medications. PCP is Dr Cabrera annually and PRN. Denies any dc needs. Will follow.

## 2024-01-23 NOTE — CARE PLAN
The patient's goals for the shift include    Problem: Pain  Goal: My pain/discomfort is manageable  Outcome: Progressing     Problem: Safety  Goal: Patient will be injury free during hospitalization  Outcome: Progressing  Goal: I will remain free of falls  Outcome: Progressing     Problem: Daily Care  Goal: Daily care needs are met  Outcome: Progressing     Problem: Psychosocial Needs  Goal: Demonstrates ability to cope with hospitalization/illness  Outcome: Progressing  Goal: Collaborate with me, my family, and caregiver to identify my specific goals  Outcome: Progressing     Problem: Discharge Barriers  Goal: My discharge needs are met  Outcome: Progressing       The clinical goals for the shift include remain HDS this shift

## 2024-01-23 NOTE — HOSPITAL COURSE
89 YOM pmh Afib slow ventricular response presenting to [facility] ED C/O of cellulitis LLE.  He had a planned procedure on 01/23/24 for pacemaker placement but was cancelled.  On exam had mild LLE, non-purulant cellulitis.  There was concern he had an allergic reaction previously to PO beta-lactams. Originally on unasysn overnight, cellulitis improved, transitioned to augmentin in AM, monitored for 8 hours without reaction.  Discharged with 14 day course of augmentin.  Held xarelto for 48 hours prior to presenting due to planned procedure. Instructed to resume Xaralto until procedure rescheduled.  Needs follow up with cardiology and PCP.

## 2024-01-23 NOTE — DISCHARGE INSTRUCTIONS
Take the antibiotic augmentin for 14 days    Continue Xaralto until cardiology tells you to hold it for procedure     Follow up with cardiologist, Dr. Gibbons, to reschedule pacemaker procedure    Follow up with Primary care provider in 3-5 days

## 2024-01-23 NOTE — CARE PLAN
Problem: Pain  Goal: My pain/discomfort is manageable  Outcome: Progressing     Problem: Safety  Goal: Patient will be injury free during hospitalization  Outcome: Progressing  Goal: I will remain free of falls  Outcome: Progressing     Problem: Daily Care  Goal: Daily care needs are met  Outcome: Progressing     Problem: Psychosocial Needs  Goal: Demonstrates ability to cope with hospitalization/illness  Outcome: Progressing  Goal: Collaborate with me, my family, and caregiver to identify my specific goals  Outcome: Progressing  Flowsheets (Taken 1/22/2024 1115)  Cultural Requests During Hospitalization: None  Spiritual Requests During Hospitalization: Scientology, would like to see    The patient's goals for the shift include      The clinical goals for the shift include Pt will remain free from falls this shift

## 2024-01-24 ENCOUNTER — PATIENT OUTREACH (OUTPATIENT)
Dept: PRIMARY CARE | Facility: CLINIC | Age: 89
End: 2024-01-24
Payer: MEDICARE

## 2024-01-24 ENCOUNTER — TELEPHONE (OUTPATIENT)
Dept: PRIMARY CARE | Facility: CLINIC | Age: 89
End: 2024-01-24
Payer: MEDICARE

## 2024-01-24 NOTE — PROGRESS NOTES
Discharge Facility: [facility]  Discharge Diagnosis:cellulitis of leg  Admission Date: 1/22/2024  Discharge Date: 1/23/2024    PCP Appointment Date: none  Specialist Appointment Date: derm 3/5/2024  Cardiology 5/14/2024  Hospital Encounter and Summary: Linked   See discharge assessment below for further details    Engagement  Call Start Time: 1126 (1/24/2024 11:26 AM)    Medications  Medications reviewed with patient/caregiver?: Yes (1/24/2024 11:26 AM)  Is the patient having any side effects they believe may be caused by any medication additions or changes?: No (1/24/2024 11:26 AM)  Does the patient have all medications ordered at discharge?: Yes (1/24/2024 11:26 AM)  Care Management Interventions: No intervention needed (1/24/2024 11:26 AM)  Prescription Comments: new med: augmentin (1/24/2024 11:26 AM)  Is the patient taking all medications as directed (includes completed medication regime)?: Yes (1/24/2024 11:26 AM)  Medication Comments: see med list (1/24/2024 11:26 AM)    Appointments  Does the patient have a primary care provider?: Yes (1/24/2024 11:26 AM)  Care Management Interventions: Advised patient to make appointment (1/24/2024 11:26 AM)  Has the patient kept scheduled appointments due by today?: Yes (1/24/2024 11:26 AM)  Care Management Interventions: Advised patient to keep appointment (1/24/2024 11:26 AM)    Self Management  What is the home health agency?: none (1/24/2024 11:26 AM)  Has home health visited the patient within 72 hours of discharge?: Not applicable (1/24/2024 11:26 AM)    Patient Teaching  Does the patient have access to their discharge instructions?: Yes (1/24/2024 11:26 AM)  Care Management Interventions: Reviewed instructions with patient (1/24/2024 11:26 AM)  What is the patient's perception of their health status since discharge?: Improving (1/24/2024 11:26 AM)  Is the patient/caregiver able to teach back the hierarchy of who to call/visit for symptoms/problems? PCP, Specialist, Home  Health nurse, Urgent Care, ED, 911: Yes (1/24/2024 11:26 AM)    Wrap Up  Wrap Up Additional Comments: CTS spoke with patient after his discharge from [facility] for cellulitis. Patient stated that he is doing well. Swelling, redness and soreness are improving. No issues with antibiotic that was prescribed. Patient needs to wait until leg clears up before rescheduling the pacemaker placement with Dr Gibbons. Patient has resumed the xarelto as directed. Will task PCP office staff for help with an appt. No questions or concerns at this time. (1/24/2024 11:26 AM)  Call End Time: 1131 (1/24/2024 11:26 AM)

## 2024-01-26 ENCOUNTER — OFFICE VISIT (OUTPATIENT)
Dept: PRIMARY CARE | Facility: CLINIC | Age: 89
End: 2024-01-26
Payer: MEDICARE

## 2024-01-26 VITALS
BODY MASS INDEX: 22.78 KG/M2 | WEIGHT: 168 LBS | DIASTOLIC BLOOD PRESSURE: 73 MMHG | HEART RATE: 58 BPM | OXYGEN SATURATION: 98 % | SYSTOLIC BLOOD PRESSURE: 148 MMHG

## 2024-01-26 DIAGNOSIS — E11.22 TYPE 2 DIABETES MELLITUS WITH CHRONIC KIDNEY DISEASE, WITHOUT LONG-TERM CURRENT USE OF INSULIN, UNSPECIFIED CKD STAGE (MULTI): ICD-10-CM

## 2024-01-26 DIAGNOSIS — I50.22 CHRONIC SYSTOLIC HEART FAILURE (MULTI): ICD-10-CM

## 2024-01-26 DIAGNOSIS — I25.709 ATHEROSCLEROSIS OF CORONARY ARTERY BYPASS GRAFT OF NATIVE HEART WITH ANGINA PECTORIS (CMS-HCC): ICD-10-CM

## 2024-01-26 DIAGNOSIS — L03.116 CELLULITIS OF LEFT LEG: Primary | ICD-10-CM

## 2024-01-26 PROCEDURE — 1159F MED LIST DOCD IN RCRD: CPT | Performed by: FAMILY MEDICINE

## 2024-01-26 PROCEDURE — 3078F DIAST BP <80 MM HG: CPT | Performed by: FAMILY MEDICINE

## 2024-01-26 PROCEDURE — 1123F ACP DISCUSS/DSCN MKR DOCD: CPT | Performed by: FAMILY MEDICINE

## 2024-01-26 PROCEDURE — 3077F SYST BP >= 140 MM HG: CPT | Performed by: FAMILY MEDICINE

## 2024-01-26 PROCEDURE — 1160F RVW MEDS BY RX/DR IN RCRD: CPT | Performed by: FAMILY MEDICINE

## 2024-01-26 PROCEDURE — 1158F ADVNC CARE PLAN TLK DOCD: CPT | Performed by: FAMILY MEDICINE

## 2024-01-26 PROCEDURE — 1126F AMNT PAIN NOTED NONE PRSNT: CPT | Performed by: FAMILY MEDICINE

## 2024-01-26 PROCEDURE — 1036F TOBACCO NON-USER: CPT | Performed by: FAMILY MEDICINE

## 2024-01-26 PROCEDURE — 99496 TRANSJ CARE MGMT HIGH F2F 7D: CPT | Performed by: FAMILY MEDICINE

## 2024-01-26 ASSESSMENT — ENCOUNTER SYMPTOMS
ABDOMINAL PAIN: 0
DIZZINESS: 1
FEVER: 0
COUGH: 0

## 2024-01-26 NOTE — ASSESSMENT & PLAN NOTE
We will continue to monitor A1c periodically  Stable based on symptoms and exam.  Continue established treatment plan and follow-up at least yearly

## 2024-01-26 NOTE — PROGRESS NOTES
Subjective   Patient ID: Hugo Weaver is a 89 y.o. male who presents for Cellulitis.    Rash  This is a new problem. The current episode started in the past 7 days. Pertinent negatives include no cough or fever.      He is here today for follow-up hospitalization 1/22 through 1/23 with left lower extremity cellulitis  I reviewed records from hospitalization and transition to care summary    Prior to hospitalization he had a several day history of redness and increased swelling involving his left leg  On admission he had a normal white blood cell count.  He had a left leg ultrasound which was negative for DVT.  CRP was in normal range.  During his stay he was treated with Unasyn with improvement, and then was transitioned to Augmentin.  He was discharged with a full 14-day course of Augmentin  He is still taking the antibiotic and feels that his leg symptoms have continued to improve.  Redness and swelling have gotten better.  No fever or chills.  He has had slight loose bowels with the antibiotic but no abdominal pain or blood.  He estimates that his leg symptoms are approximately 50% improved    He currently follows with cardiology due to history of chronic atrial fibrillation, CAD with history of CABG in past, history of left bundle branch block, and chronic systolic heart failure.  Currently takes spironolactone, enalapril, Xarelto and bumetanide as needed.  He was scheduled to have a pacemaker this week, but this was canceled due to his hospitalization for cellulitis    Review of Systems   Constitutional:  Negative for fever.   Respiratory:  Negative for cough.    Cardiovascular:  Positive for leg swelling.   Gastrointestinal:  Negative for abdominal pain.   Skin:  Positive for rash.   Neurological:  Positive for dizziness.       Objective   /73   Pulse 58   Wt 76.2 kg (168 lb)   SpO2 98%   BMI 22.78 kg/m²     Physical Exam  Vitals reviewed.   Constitutional:       General: He is not in acute  distress.     Appearance: Normal appearance. He is well-developed.   HENT:      Head: Normocephalic.   Eyes:      Conjunctiva/sclera: Conjunctivae normal.   Cardiovascular:      Rate and Rhythm: Normal rate.      Heart sounds: Normal heart sounds.      Comments: Irregularly irregular  Pulmonary:      Effort: Pulmonary effort is normal.      Breath sounds: Normal breath sounds.   Musculoskeletal:      Left lower leg: Edema present.      Comments: Left leg: There is 1+ edema.  There is dusky, dull red erythema involving the anterior and medial aspect of his left shin.  No significant warmth or tenderness   Skin:     Findings: No rash.          Neurological:      Mental Status: He is alert.   Psychiatric:         Mood and Affect: Mood normal.         Behavior: Behavior normal.         Assessment/Plan   Problem List Items Addressed This Visit    None  Visit Diagnoses       Cellulitis of left leg    -  Primary        With recent hospitalization 1/22 through 1/23/2024 with left lower extremity cellulitis.  I reviewed records from hospitalization and transition to care summary.  He was treated with IV Unasyn with improvement, and discharged with a 14-day course of Augmentin.  Leg ultrasound was negative for DVT and both CRP and white blood cell count were normal range during hospitalization.  He has continued to feel that his left leg symptoms have been improving.  I recommended that he complete the full 14-day course of Augmentin.  Follow-up if any persistent symptoms after completing course of antibiotic.  Discussed importance of going to ER if any rapidly worsening symptoms including increased swelling or redness, or if he develops a fever

## 2024-01-29 ENCOUNTER — APPOINTMENT (OUTPATIENT)
Dept: CARDIOLOGY | Facility: CLINIC | Age: 89
End: 2024-01-29
Payer: MEDICARE

## 2024-01-29 NOTE — PROGRESS NOTES
Chief Complaint:   No chief complaint on file.     History Of Present Illness:    Hugo Weaver is a 89 y.o. male with a history of LBBB, CAD s/p CABG (2003 LIMA to LAD, VG to OM, and VG to right PDA), peripheral neuropathy, atrial fibrillation, chronic systolic heart failure, abdominal aortic aneurysm s/p repair, diabetes and chronic kidney disease here for follow-up.     He still has intermittent episodes of feeling weak.  His pulse will be low at that time by his pulse oximeter.  If he gets up and marches around he starts to feel little better.     Patient recently seen at [facility] 11/2023 for atypical chest pain.  Cardiac markers were negative.  Patient did have atrial fibrillation with slow ventricular response at times.  It was recommended that EP evaluate the patient however EP was unavailable prior to the patient being discharged.  He was seen as an outpatient shortly thereafter.     In terms of his heart failure we could not initiate beta-blockers due to his bradycardia.  He was continued on Aldactone, Vasotec, and diuretics.     Was seen by Dr. Gibbons (EP) recently who recommended CRT-P to the patient.  The patient wanted to discuss that with me prior to proceeding with any intervention.     Echocardiogram 11/2/2023: Mildly reduced LV function.  Mildly enlarged RV with mildly reduced RV function.  Severe left atrial enlargement and mild to moderate right atrial enlargement.  Moderate AR, mild MR, and mild to moderate TR.  RVSP 26 mmHg.     Echocardiogram 3/1/2023: EF 40 to 45%. Abnormal septal motion consistent with LBBB. Mildly reduced right ventricular systolic function. Severe left atrial enlargement and mild right atrial enlargement. Moderate AR.     Lexiscan nuclear stress test 12/6/2021: No evidence of ischemia. Possible scar in the distal anterior and distal septal wall extending to the apex. EF 53%.     24-hour Holter February 2021: Atrial fibrillation 100% of the time. Average  heart rate 57 bpm ( bpm). Pause of 3 seconds at 4 AM on day 2.     Echocardiogram 6/12/2015: EF 30 to 35%. Mild to moderate left atrial enlargement and right atrial enlargement. Mild AR and TR.     Catheterization 12/31/2007: 95 to 99% stenosis of the mid circumflex and 80% stenosis of the right PDA. Vein graft to PDA occluded. Vein graft to OM patent. LIMA was atretic and nonfunctional with note of anterograde flow through the native LAD system.      Past Medical History:  He has a past medical history of Atrial fibrillation (CMS/HCC), Cataract, Chronic kidney disease, Coronary artery disease, Diabetes mellitus (CMS/HCC), Dyspnea, unspecified, Heart disease, HL (hearing loss), Hyperlipidemia, Hypertension, Ischemic cardiomyopathy, Other specified postprocedural states, Personal history of other diseases of the circulatory system, Personal history of other diseases of the circulatory system, Personal history of other diseases of the circulatory system, Personal history of other diseases of the digestive system (09/27/2018), Personal history of other endocrine, nutritional and metabolic disease, Personal history of other endocrine, nutritional and metabolic disease, Personal history of other endocrine, nutritional and metabolic disease, Personal history of other medical treatment, Personal history of other specified conditions (12/30/2015), and Valvular heart disease.    Past Surgical History:  He has a past surgical history that includes Other surgical history (11/17/2014); Other surgical history (11/05/2018); Other surgical history (12/03/2019); Other surgical history (07/25/2019); CT angio abdomen pelvis w and or wo IV IV contrast (03/18/2020); Coronary artery bypass graft (10/2005); and Cholecystectomy (10/2018).      Social History:  He reports that he has never smoked. He has never used smokeless tobacco. He reports that he does not currently use alcohol. He reports that he does not use drugs.    Family  History:  Family History   Problem Relation Name Age of Onset    Diabetes Mother Clemencia     Other (Cardiac disorder) Mother Clemencia     Heart disease Mother Clemencia     Hypertension Mother Clemencia     Arthritis Mother Clemencia     Diabetes type II Mother Clemencia     No Known Problems Father          Allergies:  Atorvastatin and Simvastatin    Outpatient Medications:  Current Outpatient Medications   Medication Instructions    amoxicillin-pot clavulanate (Augmentin) 875-125 mg tablet 875 mg, oral, 2 times daily    ascorbic acid (Vitamin C) 1,000 mg tablet 1 tablet, oral, Daily    azaTHIOprine (Imuran) 50 mg tablet Take 1 tablet (50mg) every morning and 1.5 tablets (75mg) every evening.    bumetanide (BUMEX) 2 mg, oral, Daily PRN    enalapril (VASOTEC) 5 mg, oral, Nightly    omega 3-dha-epa-fish oil (Fish OiL) 1,000 mg (120 mg-180 mg) capsule 1 capsule, oral, Daily    pravastatin (PRAVACHOL) 20 mg, oral, Nightly    predniSONE (DELTASONE) 1 mg, oral, Daily    rivaroxaban (Xarelto) 15 mg tablet 1 tablet, oral, Daily with evening meal, Take with food.    spironolactone (ALDACTONE) 25 mg, oral, Daily    tamsulosin (FLOMAX) 0.4 mg, oral, Nightly    vitamin E 400 Units, oral, Daily       Last Recorded Vitals:  Visit Vitals  Smoking Status Never      LASTWT(3):   Wt Readings from Last 3 Encounters:   01/26/24 76.2 kg (168 lb)   01/22/24 76.2 kg (168 lb)   01/17/24 76.2 kg (168 lb)       Physical Exam:  HEENT: Carotid upstrokes normal with no bruits. JVP is normal.  Pulmonary: Clear to auscultation bilaterally.  Cardiovascular: S1,S2, irregularly irregular. No appreciable murmurs, rubs or gallops.   Lower extremities: Warm. 2+ distal pulses. No edema.       Last Labs:  CBC -  Recent Labs     01/23/24  0516 01/22/24  1854 11/03/23  0851   WBC 6.4 7.1 6.6   HGB 11.8* 13.3* 12.6*   HCT 37.0* 41.1 38.8*    186 183   MCV 97 97 96       CMP -  Recent Labs     01/23/24  0516 01/22/24  1854 11/03/23  0851 11/02/23  1108    137 140 137   K  3.9 4.5 4.4 4.2   * 102 109* 107   CO2 22 25 23 22   ANIONGAP 14 15 12 12   BUN 27* 30* 25* 30*   CREATININE 1.28 1.49* 1.46* 1.27   EGFR 53* 44* 45* 53*   MG  --  1.52* 1.58* 1.68     Recent Labs     01/23/24  0516 01/22/24  1854 11/02/23  1108   ALBUMIN 3.6 4.3 3.6   ALKPHOS 30* 39 26*   ALT 8* 9* 9*   AST 11 13 13   BILITOT 0.7 0.7 0.6       LIPID PANEL -   Recent Labs     12/06/23  1044 01/04/23  1026 12/06/21  1047 11/16/20  1005   CHOL 160 167 147 176   LDLCALC 86  --   --   --    LDLF  --  82 77 99   HDL 49.1 50.1 46.0 51.0   TRIG 126 176* 119 129       Recent Labs     01/22/24  1854 12/06/23  1044 11/01/23  2108 10/27/23  1450 05/10/23  1028 03/06/23  1316 01/04/23  1026   *  --  468* 134*  --    < >  --    HGBA1C  --  6.5*  --   --  7.0*  --  7.4*    < > = values in this interval not displayed.           Assessment/Plan   1) CAD: No symptoms of exertional chest pain or shortness of breath. Based on the ISCHEMIA Trial no need for routine stress testing.     2) atrial fibrillation: Continue Xarelto 15 mg daily.     3) left bundle branch block: Chronic     4) chronic systolic heart failure: Patient with diminished EF intolerant to beta-blockers due to symptomatic bradycardia. Not interested in changing/adding heart failure medications at this time.  Spoke to him about the CRT-P and he and his son think they may proceed with this.  He will reach out to Dr. Gibbons's office.     5) dyslipidemia: LDL 82 most recently in January.  Continue with pravastatin     6) follow-up: As scheduled or sooner if needed      Jef Jara MD

## 2024-01-31 LAB
ATRIAL RATE: 277 BPM
Q ONSET: 209 MS
QRS COUNT: 9 BEATS
QRS DURATION: 146 MS
QT INTERVAL: 418 MS
QTC CALCULATION(BAZETT): 410 MS
QTC FREDERICIA: 413 MS
R AXIS: -64 DEGREES
T AXIS: 137 DEGREES
T OFFSET: 418 MS
VENTRICULAR RATE: 58 BPM

## 2024-02-07 ENCOUNTER — PATIENT OUTREACH (OUTPATIENT)
Dept: PRIMARY CARE | Facility: CLINIC | Age: 89
End: 2024-02-07
Payer: MEDICARE

## 2024-02-07 NOTE — PROGRESS NOTES
Unable to reach patient for call back after patient's follow up appointment with PCP. 1/26/2024  LVM with call back number for patient to call if needed

## 2024-02-15 ENCOUNTER — APPOINTMENT (OUTPATIENT)
Dept: PRIMARY CARE | Facility: CLINIC | Age: 89
End: 2024-02-15
Payer: MEDICARE

## 2024-02-16 ENCOUNTER — APPOINTMENT (OUTPATIENT)
Dept: PRIMARY CARE | Facility: CLINIC | Age: 89
End: 2024-02-16
Payer: MEDICARE

## 2024-03-05 ENCOUNTER — TELEMEDICINE (OUTPATIENT)
Dept: DERMATOLOGY | Facility: CLINIC | Age: 89
End: 2024-03-05
Payer: MEDICARE

## 2024-03-05 DIAGNOSIS — R71.0 DROP IN HEMATOCRIT: ICD-10-CM

## 2024-03-05 DIAGNOSIS — L12.0 BULLOUS PEMPHIGOID (MULTI): Primary | ICD-10-CM

## 2024-03-05 DIAGNOSIS — Z79.899 ENCOUNTER FOR LONG-TERM (CURRENT) USE OF HIGH-RISK MEDICATION: ICD-10-CM

## 2024-03-05 PROCEDURE — 99213 OFFICE O/P EST LOW 20 MIN: CPT | Performed by: DERMATOLOGY

## 2024-03-05 PROCEDURE — 1126F AMNT PAIN NOTED NONE PRSNT: CPT | Performed by: DERMATOLOGY

## 2024-03-05 PROCEDURE — 1159F MED LIST DOCD IN RCRD: CPT | Performed by: DERMATOLOGY

## 2024-03-05 PROCEDURE — 1036F TOBACCO NON-USER: CPT | Performed by: DERMATOLOGY

## 2024-03-05 PROCEDURE — 1160F RVW MEDS BY RX/DR IN RCRD: CPT | Performed by: DERMATOLOGY

## 2024-03-05 ASSESSMENT — PHYSICIAN GLOBAL ASSESSMENT (PGA)
WHAT IS THE PGA: PHYSICIAN GLOBAL ASSESSMENT:  0 - CLEAR
WHAT IS THE PGA: PHYSICIAN GLOBAL ASSESSMENT:  0 - CLEAR

## 2024-03-05 ASSESSMENT — DERMATOLOGY QUALITY OF LIFE (QOL) ASSESSMENT
WHAT SINGLE SKIN CONDITION LISTED BELOW IS THE PATIENT ANSWERING THE QUALITY-OF-LIFE ASSESSMENT QUESTIONS ABOUT: NONE OF THE ABOVE
WHAT SINGLE SKIN CONDITION LISTED BELOW IS THE PATIENT ANSWERING THE QUALITY-OF-LIFE ASSESSMENT QUESTIONS ABOUT: NONE OF THE ABOVE

## 2024-03-05 ASSESSMENT — BODY SURFACE AREA (BSA): WHAT IS THE BSA PERCENTAGE: < 3% BODY SURFACE AREA INVOLVED

## 2024-03-05 NOTE — PROGRESS NOTES
Subjective     Hugo Weaver is a 89 y.o. male who presents for the following: Bullous Pemphigoid.. Patient last seen 11/28/23. Patient denies any pruritus or new lesions. Continues to take azathioprine 50mg qAM and 75mg qPM. Patient continues to take prednisone 1mg. Patient has not had to use clobetasol since his last visit. Last labs 1/23/24 WNL.    Review of Systems:  No other skin or systemic complaints other than what is documented elsewhere in the note.    The following portions of the chart were reviewed this encounter and updated as appropriate:   Tobacco  Allergies  Meds  Problems  Med Hx  Surg Hx  Fam Hx         Skin Cancer History  No skin cancer on file.      Specialty Problems          Dermatology Problems    Bullous pemphigoid    Papilloma of eyelid    Pemphigus    Actinic keratosis    Bullous eruption    Lichen simplex chronicus    Other follicular cysts of the skin and subcutaneous tissue    Rash and other nonspecific skin eruption        Objective   Well appearing patient in no apparent distress; mood and affect are within normal limits.    A focused skin examination was performed. All findings within normal limits unless otherwise noted below.    Assessment/Plan   1. Bullous pemphigoid    Skin is clear.      Bullous pemphigoid, well-controlled with azathioprine and prednisone  - Continue azathioprine 50 mg qAM and 75mg qPM. Refills ordered 11/23 for one year.  - At this time we will have the patient stop taking prednisone 1mg daily. Discussed with patient that stopping the medication may increase the risk of flaring.    - Continue clobetasol 0.05% ointment PRN.   - CMP, CBC w/ diff 1/23/24 remains stable, with slight decrease in hematocrit (see below). We will have the patient repeat labs in two months. Order placed today.   - RTC in 6 months, call back in one month for appointment as the schedule has not been set up yet    Related Procedures  Follow Up In Dermatology - Established  Patient  CBC and Auto Differential  Comprehensive metabolic panel    Related Medications  azaTHIOprine (Imuran) 50 mg tablet  Take 1 tablet (50mg) every morning and 1.5 tablets (75mg) every evening.    2. Drop in hematocrit  A slight drop in the patient's hematocrit (37) was noted on his lab work from 1/23/24, compared to his previous lab work in 11/3/23 (38.8). The current value continues to fall in the patient's normal range. (37-40). We will continue to monitor.     Related Procedures  CBC and Auto Differential  Comprehensive metabolic panel    3. Encounter for long-term (current) use of high-risk medication    Related Procedures  CBC and Auto Differential  Comprehensive metabolic panel        Silver White MD

## 2024-03-07 ENCOUNTER — PATIENT OUTREACH (OUTPATIENT)
Dept: PRIMARY CARE | Facility: CLINIC | Age: 89
End: 2024-03-07
Payer: MEDICARE

## 2024-03-25 ENCOUNTER — TELEPHONE (OUTPATIENT)
Dept: CARDIOLOGY | Facility: CLINIC | Age: 89
End: 2024-03-25
Payer: MEDICARE

## 2024-03-25 DIAGNOSIS — I48.91 ATRIAL FIBRILLATION, UNSPECIFIED TYPE (MULTI): ICD-10-CM

## 2024-04-08 ENCOUNTER — PATIENT OUTREACH (OUTPATIENT)
Dept: PRIMARY CARE | Facility: CLINIC | Age: 89
End: 2024-04-08
Payer: MEDICARE

## 2024-04-11 ENCOUNTER — LAB (OUTPATIENT)
Dept: LAB | Facility: LAB | Age: 89
End: 2024-04-11
Payer: MEDICARE

## 2024-04-11 DIAGNOSIS — I44.7 LEFT BUNDLE-BRANCH BLOCK: ICD-10-CM

## 2024-04-11 DIAGNOSIS — I44.7 LEFT BUNDLE-BRANCH BLOCK: Primary | ICD-10-CM

## 2024-04-11 DIAGNOSIS — I50.22 CHRONIC SYSTOLIC HEART FAILURE (MULTI): ICD-10-CM

## 2024-04-11 LAB
ANION GAP SERPL CALC-SCNC: 15 MMOL/L (ref 10–20)
BUN SERPL-MCNC: 27 MG/DL (ref 6–23)
CALCIUM SERPL-MCNC: 9.4 MG/DL (ref 8.6–10.6)
CHLORIDE SERPL-SCNC: 105 MMOL/L (ref 98–107)
CO2 SERPL-SCNC: 23 MMOL/L (ref 21–32)
CREAT SERPL-MCNC: 1.42 MG/DL (ref 0.5–1.3)
EGFRCR SERPLBLD CKD-EPI 2021: 46 ML/MIN/1.73M*2
ERYTHROCYTE [DISTWIDTH] IN BLOOD BY AUTOMATED COUNT: 14.4 % (ref 11.5–14.5)
GLUCOSE SERPL-MCNC: 135 MG/DL (ref 74–99)
HCT VFR BLD AUTO: 41.4 % (ref 41–52)
HGB BLD-MCNC: 12.8 G/DL (ref 13.5–17.5)
INR PPP: 1.9 (ref 0.9–1.1)
MCH RBC QN AUTO: 30.9 PG (ref 26–34)
MCHC RBC AUTO-ENTMCNC: 30.9 G/DL (ref 32–36)
MCV RBC AUTO: 100 FL (ref 80–100)
NRBC BLD-RTO: 0 /100 WBCS (ref 0–0)
PLATELET # BLD AUTO: 180 X10*3/UL (ref 150–450)
POTASSIUM SERPL-SCNC: 4.3 MMOL/L (ref 3.5–5.3)
PROTHROMBIN TIME: 22 SECONDS (ref 9.8–12.8)
RBC # BLD AUTO: 4.14 X10*6/UL (ref 4.5–5.9)
SODIUM SERPL-SCNC: 139 MMOL/L (ref 136–145)
WBC # BLD AUTO: 6 X10*3/UL (ref 4.4–11.3)

## 2024-04-11 PROCEDURE — 85027 COMPLETE CBC AUTOMATED: CPT

## 2024-04-11 PROCEDURE — 36415 COLL VENOUS BLD VENIPUNCTURE: CPT

## 2024-04-11 PROCEDURE — 85610 PROTHROMBIN TIME: CPT

## 2024-04-11 PROCEDURE — 80048 BASIC METABOLIC PNL TOTAL CA: CPT

## 2024-04-12 ENCOUNTER — TELEPHONE (OUTPATIENT)
Dept: CARDIOLOGY | Facility: HOSPITAL | Age: 89
End: 2024-04-12
Payer: MEDICARE

## 2024-04-12 NOTE — TELEPHONE ENCOUNTER
Patient worried about taking care of himself at home after device placement, has no assistance at home. Requesting to cancel procedure and rebook at a later date when he can arrange help with his family.

## 2024-04-15 ENCOUNTER — TELEPHONE (OUTPATIENT)
Dept: CARDIOLOGY | Facility: HOSPITAL | Age: 89
End: 2024-04-15
Payer: MEDICARE

## 2024-04-15 DIAGNOSIS — I42.0 DILATED CARDIOMYOPATHY (MULTI): Primary | ICD-10-CM

## 2024-04-16 ENCOUNTER — HOSPITAL ENCOUNTER (OUTPATIENT)
Facility: HOSPITAL | Age: 89
Discharge: NURSING, REHAB OR LONG-TERM CARE | End: 2024-04-17
Attending: INTERNAL MEDICINE | Admitting: INTERNAL MEDICINE
Payer: MEDICARE

## 2024-04-16 ENCOUNTER — APPOINTMENT (OUTPATIENT)
Dept: CARDIOLOGY | Facility: HOSPITAL | Age: 89
End: 2024-04-16
Payer: MEDICARE

## 2024-04-16 DIAGNOSIS — L12.0 BULLOUS PEMPHIGOID (MULTI): ICD-10-CM

## 2024-04-16 DIAGNOSIS — R00.1 SYMPTOMATIC BRADYCARDIA: Primary | ICD-10-CM

## 2024-04-16 DIAGNOSIS — I48.91 ATRIAL FIBRILLATION, UNSPECIFIED TYPE (MULTI): ICD-10-CM

## 2024-04-16 DIAGNOSIS — I42.0 DILATED CARDIOMYOPATHY (MULTI): ICD-10-CM

## 2024-04-16 DIAGNOSIS — I71.40 ABDOMINAL AORTIC ANEURYSM (AAA) WITHOUT RUPTURE, UNSPECIFIED PART (CMS-HCC): ICD-10-CM

## 2024-04-16 DIAGNOSIS — I49.5 SINUS NODE DYSFUNCTION (MULTI): ICD-10-CM

## 2024-04-16 DIAGNOSIS — Z95.0 PACEMAKER: ICD-10-CM

## 2024-04-16 LAB
ANION GAP SERPL CALC-SCNC: 17 MMOL/L (ref 10–20)
APTT PPP: 38 SECONDS (ref 27–38)
BUN SERPL-MCNC: 27 MG/DL (ref 6–23)
CALCIUM SERPL-MCNC: 10 MG/DL (ref 8.6–10.3)
CHLORIDE SERPL-SCNC: 105 MMOL/L (ref 98–107)
CO2 SERPL-SCNC: 20 MMOL/L (ref 21–32)
CREAT SERPL-MCNC: 1.46 MG/DL (ref 0.5–1.3)
EGFRCR SERPLBLD CKD-EPI 2021: 45 ML/MIN/1.73M*2
ERYTHROCYTE [DISTWIDTH] IN BLOOD BY AUTOMATED COUNT: 13.9 % (ref 11.5–14.5)
GLUCOSE SERPL-MCNC: 131 MG/DL (ref 74–99)
HCT VFR BLD AUTO: 40.6 % (ref 41–52)
HGB BLD-MCNC: 13.7 G/DL (ref 13.5–17.5)
INR PPP: 1.2 (ref 0.9–1.1)
MCH RBC QN AUTO: 31.8 PG (ref 26–34)
MCHC RBC AUTO-ENTMCNC: 33.7 G/DL (ref 32–36)
MCV RBC AUTO: 94 FL (ref 80–100)
NRBC BLD-RTO: 0 /100 WBCS (ref 0–0)
PLATELET # BLD AUTO: 155 X10*3/UL (ref 150–450)
POTASSIUM SERPL-SCNC: 4.3 MMOL/L (ref 3.5–5.3)
PROTHROMBIN TIME: 13.1 SECONDS (ref 9.8–12.8)
RBC # BLD AUTO: 4.31 X10*6/UL (ref 4.5–5.9)
SODIUM SERPL-SCNC: 138 MMOL/L (ref 136–145)
WBC # BLD AUTO: 6.8 X10*3/UL (ref 4.4–11.3)

## 2024-04-16 PROCEDURE — 33225 L VENTRIC PACING LEAD ADD-ON: CPT | Performed by: INTERNAL MEDICINE

## 2024-04-16 PROCEDURE — 2780000003 HC OR 278 NO HCPCS: Performed by: INTERNAL MEDICINE

## 2024-04-16 PROCEDURE — 36415 COLL VENOUS BLD VENIPUNCTURE: CPT | Performed by: NURSE PRACTITIONER

## 2024-04-16 PROCEDURE — 2720000007 HC OR 272 NO HCPCS: Performed by: INTERNAL MEDICINE

## 2024-04-16 PROCEDURE — 2500000006 HC RX 250 W HCPCS SELF ADMINISTERED DRUGS (ALT 637 FOR ALL PAYERS): Performed by: NURSE PRACTITIONER

## 2024-04-16 PROCEDURE — C1889 IMPLANT/INSERT DEVICE, NOC: HCPCS | Performed by: INTERNAL MEDICINE

## 2024-04-16 PROCEDURE — 93005 ELECTROCARDIOGRAM TRACING: CPT | Mod: 59

## 2024-04-16 PROCEDURE — 2500000004 HC RX 250 GENERAL PHARMACY W/ HCPCS (ALT 636 FOR OP/ED): Performed by: NURSE PRACTITIONER

## 2024-04-16 PROCEDURE — 99153 MOD SED SAME PHYS/QHP EA: CPT | Performed by: INTERNAL MEDICINE

## 2024-04-16 PROCEDURE — 99152 MOD SED SAME PHYS/QHP 5/>YRS: CPT | Performed by: INTERNAL MEDICINE

## 2024-04-16 PROCEDURE — C1730 CATH, EP, 19 OR FEW ELECT: HCPCS | Performed by: INTERNAL MEDICINE

## 2024-04-16 PROCEDURE — C1887 CATHETER, GUIDING: HCPCS | Performed by: INTERNAL MEDICINE

## 2024-04-16 PROCEDURE — 85730 THROMBOPLASTIN TIME PARTIAL: CPT | Mod: 91 | Performed by: NURSE PRACTITIONER

## 2024-04-16 PROCEDURE — 2500000001 HC RX 250 WO HCPCS SELF ADMINISTERED DRUGS (ALT 637 FOR MEDICARE OP): Performed by: NURSE PRACTITIONER

## 2024-04-16 PROCEDURE — C1892 INTRO/SHEATH,FIXED,PEEL-AWAY: HCPCS | Performed by: INTERNAL MEDICINE

## 2024-04-16 PROCEDURE — 2500000004 HC RX 250 GENERAL PHARMACY W/ HCPCS (ALT 636 FOR OP/ED): Performed by: INTERNAL MEDICINE

## 2024-04-16 PROCEDURE — 2550000001 HC RX 255 CONTRASTS: Performed by: INTERNAL MEDICINE

## 2024-04-16 PROCEDURE — 33207 INSERT HEART PM VENTRICULAR: CPT | Mod: KX | Performed by: INTERNAL MEDICINE

## 2024-04-16 PROCEDURE — C2621 PMKR, OTHER THAN SING/DUAL: HCPCS | Performed by: INTERNAL MEDICINE

## 2024-04-16 PROCEDURE — 7100000009 HC PHASE TWO TIME - INITIAL BASE CHARGE: Performed by: INTERNAL MEDICINE

## 2024-04-16 PROCEDURE — 7100000011 HC EXTENDED STAY RECOVERY HOURLY - NURSING UNIT

## 2024-04-16 PROCEDURE — C1900 LEAD, CORONARY VENOUS: HCPCS | Performed by: INTERNAL MEDICINE

## 2024-04-16 PROCEDURE — C1769 GUIDE WIRE: HCPCS | Performed by: INTERNAL MEDICINE

## 2024-04-16 PROCEDURE — 2500000005 HC RX 250 GENERAL PHARMACY W/O HCPCS: Performed by: INTERNAL MEDICINE

## 2024-04-16 PROCEDURE — 80048 BASIC METABOLIC PNL TOTAL CA: CPT | Performed by: NURSE PRACTITIONER

## 2024-04-16 PROCEDURE — 75860 VEIN X-RAY NECK: CPT | Mod: CCI | Performed by: INTERNAL MEDICINE

## 2024-04-16 PROCEDURE — C1894 INTRO/SHEATH, NON-LASER: HCPCS | Performed by: INTERNAL MEDICINE

## 2024-04-16 PROCEDURE — 33207 INSERT HEART PM VENTRICULAR: CPT | Performed by: INTERNAL MEDICINE

## 2024-04-16 PROCEDURE — 7100000010 HC PHASE TWO TIME - EACH INCREMENTAL 1 MINUTE: Performed by: INTERNAL MEDICINE

## 2024-04-16 PROCEDURE — 93010 ELECTROCARDIOGRAM REPORT: CPT | Performed by: INTERNAL MEDICINE

## 2024-04-16 PROCEDURE — 2750000001 HC OR 275 NO HCPCS: Performed by: INTERNAL MEDICINE

## 2024-04-16 PROCEDURE — 85027 COMPLETE CBC AUTOMATED: CPT | Performed by: NURSE PRACTITIONER

## 2024-04-16 PROCEDURE — C1898 LEAD, PMKR, OTHER THAN TRANS: HCPCS | Performed by: INTERNAL MEDICINE

## 2024-04-16 PROCEDURE — G0378 HOSPITAL OBSERVATION PER HR: HCPCS

## 2024-04-16 DEVICE — LEAD 429888 MRI CANT US
Type: IMPLANTABLE DEVICE | Site: CHEST | Status: FUNCTIONAL
Brand: ATTAIN PERFORMA™ MRI SURESCAN™

## 2024-04-16 DEVICE — CRTP W4TR01 PERCEPTA QUAD CRTP MRI US
Type: IMPLANTABLE DEVICE | Site: CHEST | Status: FUNCTIONAL
Brand: PERCEPTA™ QUAD CRT-P MRI SURESCAN™

## 2024-04-16 DEVICE — SLITTER, ADJUSTABLE: Type: IMPLANTABLE DEVICE | Site: CHEST | Status: FUNCTIONAL

## 2024-04-16 DEVICE — LEAD 383069 SELECT SECURE US EN FPA
Type: IMPLANTABLE DEVICE | Site: CHEST | Status: FUNCTIONAL
Brand: SELECTSECURE™

## 2024-04-16 RX ORDER — IODIXANOL 320 MG/ML
INJECTION, SOLUTION INTRAVASCULAR AS NEEDED
Status: DISCONTINUED | OUTPATIENT
Start: 2024-04-16 | End: 2024-04-16 | Stop reason: HOSPADM

## 2024-04-16 RX ORDER — SODIUM CHLORIDE 9 MG/ML
20 INJECTION, SOLUTION INTRAVENOUS CONTINUOUS
Status: DISCONTINUED | OUTPATIENT
Start: 2024-04-16 | End: 2024-04-17

## 2024-04-16 RX ORDER — MIDAZOLAM HYDROCHLORIDE 1 MG/ML
INJECTION, SOLUTION INTRAMUSCULAR; INTRAVENOUS AS NEEDED
Status: DISCONTINUED | OUTPATIENT
Start: 2024-04-16 | End: 2024-04-16 | Stop reason: HOSPADM

## 2024-04-16 RX ORDER — TAMSULOSIN HYDROCHLORIDE 0.4 MG/1
0.4 CAPSULE ORAL NIGHTLY
Status: DISCONTINUED | OUTPATIENT
Start: 2024-04-16 | End: 2024-04-17 | Stop reason: HOSPADM

## 2024-04-16 RX ORDER — ONDANSETRON HYDROCHLORIDE 2 MG/ML
4 INJECTION, SOLUTION INTRAVENOUS EVERY 8 HOURS PRN
Status: DISCONTINUED | OUTPATIENT
Start: 2024-04-16 | End: 2024-04-17 | Stop reason: HOSPADM

## 2024-04-16 RX ORDER — ENALAPRIL MALEATE 5 MG/1
5 TABLET ORAL NIGHTLY
Status: DISCONTINUED | OUTPATIENT
Start: 2024-04-16 | End: 2024-04-17 | Stop reason: HOSPADM

## 2024-04-16 RX ORDER — PRAVASTATIN SODIUM 20 MG/1
20 TABLET ORAL NIGHTLY
Status: DISCONTINUED | OUTPATIENT
Start: 2024-04-16 | End: 2024-04-17 | Stop reason: HOSPADM

## 2024-04-16 RX ORDER — MUPIROCIN 20 MG/G
1 OINTMENT TOPICAL ONCE
Status: COMPLETED | OUTPATIENT
Start: 2024-04-16 | End: 2024-04-16

## 2024-04-16 RX ORDER — FENTANYL CITRATE 50 UG/ML
INJECTION, SOLUTION INTRAMUSCULAR; INTRAVENOUS AS NEEDED
Status: DISCONTINUED | OUTPATIENT
Start: 2024-04-16 | End: 2024-04-16 | Stop reason: HOSPADM

## 2024-04-16 RX ORDER — CEPHALEXIN 500 MG/1
500 CAPSULE ORAL EVERY 12 HOURS SCHEDULED
Status: DISCONTINUED | OUTPATIENT
Start: 2024-04-16 | End: 2024-04-17 | Stop reason: HOSPADM

## 2024-04-16 RX ORDER — SPIRONOLACTONE 25 MG/1
25 TABLET ORAL DAILY
Status: DISCONTINUED | OUTPATIENT
Start: 2024-04-16 | End: 2024-04-17 | Stop reason: HOSPADM

## 2024-04-16 RX ORDER — CHLORHEXIDINE GLUCONATE 40 MG/ML
SOLUTION TOPICAL ONCE
Status: COMPLETED | OUTPATIENT
Start: 2024-04-16 | End: 2024-04-16

## 2024-04-16 RX ORDER — CEFAZOLIN SODIUM 1 G/50ML
SOLUTION INTRAVENOUS CONTINUOUS PRN
Status: COMPLETED | OUTPATIENT
Start: 2024-04-16 | End: 2024-04-16

## 2024-04-16 RX ORDER — VANCOMYCIN HYDROCHLORIDE 1 G/200ML
1000 INJECTION, SOLUTION INTRAVENOUS ONCE
Status: DISCONTINUED | OUTPATIENT
Start: 2024-04-16 | End: 2024-04-16

## 2024-04-16 RX ORDER — TRAMADOL HYDROCHLORIDE 50 MG/1
50 TABLET ORAL EVERY 6 HOURS PRN
Status: DISCONTINUED | OUTPATIENT
Start: 2024-04-16 | End: 2024-04-17 | Stop reason: HOSPADM

## 2024-04-16 RX ORDER — ACETAMINOPHEN 325 MG/1
650 TABLET ORAL EVERY 4 HOURS PRN
Status: DISCONTINUED | OUTPATIENT
Start: 2024-04-16 | End: 2024-04-17 | Stop reason: HOSPADM

## 2024-04-16 RX ORDER — AZATHIOPRINE 50 MG/1
50 TABLET ORAL DAILY
Status: DISCONTINUED | OUTPATIENT
Start: 2024-04-16 | End: 2024-04-17 | Stop reason: HOSPADM

## 2024-04-16 RX ORDER — LIDOCAINE HYDROCHLORIDE 10 MG/ML
INJECTION, SOLUTION EPIDURAL; INFILTRATION; INTRACAUDAL; PERINEURAL AS NEEDED
Status: DISCONTINUED | OUTPATIENT
Start: 2024-04-16 | End: 2024-04-16 | Stop reason: HOSPADM

## 2024-04-16 RX ADMIN — TRAMADOL HYDROCHLORIDE 50 MG: 50 TABLET, COATED ORAL at 20:34

## 2024-04-16 RX ADMIN — ENALAPRIL MALEATE 5 MG: 5 TABLET ORAL at 20:34

## 2024-04-16 RX ADMIN — PRAVASTATIN SODIUM 20 MG: 20 TABLET ORAL at 20:34

## 2024-04-16 RX ADMIN — Medication: at 12:06

## 2024-04-16 RX ADMIN — SODIUM CHLORIDE 20 ML/HR: 9 INJECTION, SOLUTION INTRAVENOUS at 12:06

## 2024-04-16 RX ADMIN — CEPHALEXIN 500 MG: 500 CAPSULE ORAL at 20:34

## 2024-04-16 RX ADMIN — SODIUM CHLORIDE 20 ML/HR: 9 INJECTION, SOLUTION INTRAVENOUS at 12:05

## 2024-04-16 RX ADMIN — MUPIROCIN 1 APPLICATION: 20 OINTMENT TOPICAL at 12:05

## 2024-04-16 RX ADMIN — TAMSULOSIN HYDROCHLORIDE 0.4 MG: 0.4 CAPSULE ORAL at 20:34

## 2024-04-16 SDOH — SOCIAL STABILITY: SOCIAL INSECURITY: ABUSE: ADULT

## 2024-04-16 SDOH — SOCIAL STABILITY: SOCIAL INSECURITY: DO YOU FEEL ANYONE HAS EXPLOITED OR TAKEN ADVANTAGE OF YOU FINANCIALLY OR OF YOUR PERSONAL PROPERTY?: NO

## 2024-04-16 SDOH — SOCIAL STABILITY: SOCIAL INSECURITY: HAVE YOU HAD ANY THOUGHTS OF HARMING ANYONE ELSE?: NO

## 2024-04-16 SDOH — SOCIAL STABILITY: SOCIAL INSECURITY: ARE YOU OR HAVE YOU BEEN THREATENED OR ABUSED PHYSICALLY, EMOTIONALLY, OR SEXUALLY BY ANYONE?: NO

## 2024-04-16 SDOH — SOCIAL STABILITY: SOCIAL INSECURITY: HAVE YOU HAD THOUGHTS OF HARMING ANYONE ELSE?: NO

## 2024-04-16 SDOH — SOCIAL STABILITY: SOCIAL INSECURITY: DOES ANYONE TRY TO KEEP YOU FROM HAVING/CONTACTING OTHER FRIENDS OR DOING THINGS OUTSIDE YOUR HOME?: NO

## 2024-04-16 SDOH — SOCIAL STABILITY: SOCIAL INSECURITY: DO YOU FEEL UNSAFE GOING BACK TO THE PLACE WHERE YOU ARE LIVING?: NO

## 2024-04-16 SDOH — SOCIAL STABILITY: SOCIAL INSECURITY: HAS ANYONE EVER THREATENED TO HURT YOUR FAMILY OR YOUR PETS?: NO

## 2024-04-16 SDOH — SOCIAL STABILITY: SOCIAL INSECURITY: ARE THERE ANY APPARENT SIGNS OF INJURIES/BEHAVIORS THAT COULD BE RELATED TO ABUSE/NEGLECT?: NO

## 2024-04-16 ASSESSMENT — LIFESTYLE VARIABLES
AUDIT-C TOTAL SCORE: 0
SKIP TO QUESTIONS 9-10: 1
HOW OFTEN DO YOU HAVE 6 OR MORE DRINKS ON ONE OCCASION: NEVER
AUDIT-C TOTAL SCORE: 0
HOW OFTEN DO YOU HAVE A DRINK CONTAINING ALCOHOL: NEVER
HOW MANY STANDARD DRINKS CONTAINING ALCOHOL DO YOU HAVE ON A TYPICAL DAY: PATIENT DOES NOT DRINK

## 2024-04-16 ASSESSMENT — COGNITIVE AND FUNCTIONAL STATUS - GENERAL
WALKING IN HOSPITAL ROOM: A LITTLE
DRESSING REGULAR LOWER BODY CLOTHING: A LITTLE
HELP NEEDED FOR BATHING: A LITTLE
DAILY ACTIVITIY SCORE: 20
PATIENT BASELINE BEDBOUND: NO
CLIMB 3 TO 5 STEPS WITH RAILING: A LOT
STANDING UP FROM CHAIR USING ARMS: A LITTLE
TURNING FROM BACK TO SIDE WHILE IN FLAT BAD: A LITTLE
MOVING TO AND FROM BED TO CHAIR: A LITTLE
TOILETING: A LITTLE
MOVING FROM LYING ON BACK TO SITTING ON SIDE OF FLAT BED WITH BEDRAILS: A LITTLE
MOBILITY SCORE: 17
DRESSING REGULAR UPPER BODY CLOTHING: A LITTLE

## 2024-04-16 ASSESSMENT — ACTIVITIES OF DAILY LIVING (ADL)
ADEQUATE_TO_COMPLETE_ADL: YES
ASSISTIVE_DEVICE: OTHER (COMMENT)
HEARING - RIGHT EAR: FUNCTIONAL
DRESSING YOURSELF: INDEPENDENT
JUDGMENT_ADEQUATE_SAFELY_COMPLETE_DAILY_ACTIVITIES: YES
BATHING: NEEDS ASSISTANCE
FEEDING YOURSELF: INDEPENDENT
HEARING - LEFT EAR: FUNCTIONAL
WALKS IN HOME: INDEPENDENT
GROOMING: INDEPENDENT
PATIENT'S MEMORY ADEQUATE TO SAFELY COMPLETE DAILY ACTIVITIES?: YES
TOILETING: NEEDS ASSISTANCE

## 2024-04-16 ASSESSMENT — PAIN - FUNCTIONAL ASSESSMENT
PAIN_FUNCTIONAL_ASSESSMENT: 0-10
PAIN_FUNCTIONAL_ASSESSMENT: 0-10

## 2024-04-16 ASSESSMENT — PAIN SCALES - GENERAL
PAINLEVEL_OUTOF10: 0 - NO PAIN

## 2024-04-16 ASSESSMENT — PATIENT HEALTH QUESTIONNAIRE - PHQ9
SUM OF ALL RESPONSES TO PHQ9 QUESTIONS 1 & 2: 0
1. LITTLE INTEREST OR PLEASURE IN DOING THINGS: NOT AT ALL
2. FEELING DOWN, DEPRESSED OR HOPELESS: NOT AT ALL

## 2024-04-16 NOTE — NURSING NOTE
Pacer device rep at bedside, explaining home monitoring device to patient and son. Aware of plans to keep patient overnight. Box lunch provided. 12 lead EKG in progress.

## 2024-04-16 NOTE — Clinical Note
The PACEMAKER,  QUAD CRT-P MRI SURESCAN - [id number] device was inserted. The leads were placed into the connector and visually verified to be in correct position.

## 2024-04-16 NOTE — H&P
History Of Present Illness  89 y.o. year old male patient who is referred for management and evaluation of symptomatic bradycardia. He has a history of CAD status post CABG, ischemic cardiomyopathy with a fluctuating ejection fraction between 35 and 50%, as well as permanent atrial fibrillation.  He has been unable to tolerate AV adrian agents in the past due to symptomatic bradycardia.  He has had a recent hospital admission where he was noted to be bradycardic to the 30s.        He is here today for elective CRT-P placement.      Past Medical History  Past Medical History:   Diagnosis Date    Atrial fibrillation (Multi)     Cataract     Chronic kidney disease     Coronary artery disease     Diabetes mellitus (Multi)     Dyspnea, unspecified     Dyspnea    Heart disease     HL (hearing loss)     Hyperlipidemia     Hypertension     Ischemic cardiomyopathy     Other specified postprocedural states     History of endoscopy    Personal history of other diseases of the circulatory system     History of aortic valve insufficiency    Personal history of other diseases of the circulatory system     History of mitral valve insufficiency    Personal history of other diseases of the circulatory system     Personal history of coronary atherosclerosis    Personal history of other diseases of the digestive system 09/27/2018    History of biliary colic    Personal history of other endocrine, nutritional and metabolic disease     History of hyperlipidemia    Personal history of other endocrine, nutritional and metabolic disease     History of obesity    Personal history of other endocrine, nutritional and metabolic disease     History of type 1 diabetes mellitus    Personal history of other medical treatment     History of echocardiogram    Personal history of other specified conditions 12/30/2015    History of edema    Valvular heart disease        Surgical History  Past Surgical History:   Procedure Laterality Date     "CHOLECYSTECTOMY  10/2018    CORONARY ARTERY BYPASS GRAFT  10/2005    CT ABDOMEN PELVIS ANGIOGRAM W AND/OR WO IV CONTRAST  03/18/2020    CT ABDOMEN PELVIS ANGIOGRAM W AND/OR WO IV CONTRAST 3/18/2020 [facility]    OTHER SURGICAL HISTORY  11/17/2014    Cardiac Catheter His Ablation    OTHER SURGICAL HISTORY  11/05/2018    Cholecystectomy laparoscopic    OTHER SURGICAL HISTORY  12/03/2019    Coronary artery bypass graft    OTHER SURGICAL HISTORY  07/25/2019    Gallbladder surgery        Social History  He reports that he has quit smoking. His smoking use included cigarettes. He has never used smokeless tobacco. He reports that he does not currently use alcohol. He reports that he does not use drugs.    Family History  Family History   Problem Relation Name Age of Onset    Diabetes Mother Clemencia     Other (Cardiac disorder) Mother Clemencia     Heart disease Mother Clemencia     Hypertension Mother Clemencia     Arthritis Mother Clemencia     Diabetes type II Mother Clemencia     No Known Problems Father          Allergies  Atorvastatin and Simvastatin    Review of Systems   All other systems reviewed and are negative.       Physical Exam  Vitals reviewed.     Gen: NAD, sitting comfortably  HEENT: NC/AT  Card: Bradycardic, irregular rhythm  Pulm: Clear to auscultation bilaterally  Ext: No LE edema  Neuro: No focal deficits       Last Recorded Vitals  Blood pressure (!) 144/49, pulse (!) 44, temperature 36.9 °C (98.4 °F), temperature source Temporal, resp. rate 18, height 1.854 m (6' 1\"), weight 73.9 kg (162 lb 14.7 oz), SpO2 98%.    Relevant Results           Assessment/Plan   Principal Problem:    Dilated cardiomyopathy (Multi)  Active Problems:    Sinus node dysfunction (Multi)      Plan to move forward with BIV pacemaker implantation.        I spent 20 minutes in the professional and overall care of this patient.      Andrez Gibbons MD    "

## 2024-04-16 NOTE — NURSING NOTE
Patient transported via w/c to 8S, room change to 820, son informed and accompanied patient to his new room taking all of his belongings. (Rollator, clothes, shoes, home monitoring device).

## 2024-04-16 NOTE — DISCHARGE INSTRUCTIONS
Home going instructions after a Pacemaker/ Defibrillator Implant    After a procedure using sedation  You should return home and rest for the remainder of the day and evening.   It is recommended a responsible adult be with you for the first 24 hours after the procedure.  Do not make any legal decisions for 24 hours after your procedure.  Do not drink alcoholic beverages for 24 hours after your procedure.    Wound care  Leave the surgical dressing in place for 7 days post implant  The dressing will be removed at the 1 week follow up appointment.    Please keep your incision dry, the dressing is water resistant.    You may shower avoid water directly hitting the dressing.     Inspect your incisional site/ dressing each day  Apply ice to the site 3-4 times per day in 20 minute intervals for at least 2 days after surgery  It is normal for the area around the incision to be tender for a few weeks following surgery.     Pain relievers such as Tylenol or Motrin are usually sufficient for pain relief.    You may be prescribed Tramadol in addition.  Take as prescribed alternating with Tylenol or Motrin.    Activity  Avoid driving for 1 week.  If you have had passing out spells or previously restricted from driving, discuss driving restrictions with your doctor.  For the first 4 to 6 weeks after implant avoid excessive/repetitive arm movement on the side of your new implant.    Do not raise, push, pull, or stretch your arm above shoulder level.    Do not  items that weigh greater than 10 lbs with the device arm.    Wear your binder/sling for the first 48 hours then at night to remind you not to move the arm over your head and during the day as needed.    Report to your provider   Increased redness, swelling, drainage or gaping of your incisional site.  Increased pain at site unrelieved by pain medication.  Fever or chills prior to the 1 week appointment.  Bright red bleeding from the incisional site or complete  saturation of the dressing.  Dizziness, lightheadedness, passing out, or defibrillator shocks.    Remote monitoring/ Device ID card  You have been instructed by the device company representative regarding remote home monitoring.   There are multiple types of home monitoring units, please follow the instructions given  If you have questions please contact the device clinic for further instruction  After implant you will receive a temporary card.    Permanent card will come in the mail in the next few weeks.  It is important that you carry your pacemaker ID card with you at all times.    Inform all doctors and healthcare providers that you have a pacemaker.      Electromagnetic Interference:    Microwave ovens are safe to use.    Please inform any physicians of your pacemaker implant prior to MRI for appropriate scheduling.    You should be prepared to show your pacemaker identification card to the security guards at metal detectors.    Hand wand detector should be kept away from the pacemaker.    Read the patient booklet for more information.      Follow up appointments  Incision (wound) check in 1 week.  Appointment for Chest xray, device check, and provider in 3 months.  These appointments will be scheduled and appear on your After Visit Summary.

## 2024-04-16 NOTE — NURSING NOTE
Patient ambulated with standby assist to BR, uses his rollator. Gait slow, steady. Will call transport for transfer to .

## 2024-04-16 NOTE — NURSING NOTE
Patient arrived via cart from EP lab S/P BiV PPM implant via lt upper chest performed by Dr. Gibbons. Patient A/O x4, denies any complaints of pain, asking when he will be able to eat/drink. Lt upper chest mepilex dressing dry/intact site with some erythema surrounding his dressing . Ice pack placed and patient has lt arm in immobilizer. Vital signs stable.

## 2024-04-16 NOTE — NURSING NOTE
Left upper chest dressing remains ry/intact, ice pack in place. Vital signs stable. Patient fully awake so has been updated that no orders for bedrest, so once he needs assistance to use the BR to make sure he calls for assist. Report has been called to NEHEMIAH Alexandre on 8S. Will arrange for transporter for  after 1720, will recover patient here for full hour. Bettie made aware that device rep has been in, 12 lead EKG has been completed and that patient is eating his box lunch. Also aware patient lives alone, therefore PT/OT eval has been ordered and will see patient tomorrow to help with discharge planning. Son at bedside.

## 2024-04-16 NOTE — NURSING NOTE
Handoff report received from off-going RNShauna. Patient is currently in EP lab, planned procedure is BiV PPM Implant. Past medical history, allergies and labs reviewed as well. Last dose of Xarelto was 4/13/24. Plan is to admit patient as he lives alone, nursing supervisor has been made aware.

## 2024-04-17 ENCOUNTER — APPOINTMENT (OUTPATIENT)
Dept: CARDIOLOGY | Facility: HOSPITAL | Age: 89
End: 2024-04-17
Payer: MEDICARE

## 2024-04-17 ENCOUNTER — HOSPITAL ENCOUNTER (OUTPATIENT)
Dept: CARDIOLOGY | Facility: HOSPITAL | Age: 89
Discharge: HOME | End: 2024-04-17
Payer: MEDICARE

## 2024-04-17 ENCOUNTER — APPOINTMENT (OUTPATIENT)
Dept: RADIOLOGY | Facility: HOSPITAL | Age: 89
End: 2024-04-17
Payer: MEDICARE

## 2024-04-17 VITALS
RESPIRATION RATE: 18 BRPM | OXYGEN SATURATION: 97 % | HEART RATE: 76 BPM | SYSTOLIC BLOOD PRESSURE: 132 MMHG | DIASTOLIC BLOOD PRESSURE: 69 MMHG | TEMPERATURE: 97.5 F

## 2024-04-17 VITALS
BODY MASS INDEX: 21.59 KG/M2 | SYSTOLIC BLOOD PRESSURE: 148 MMHG | WEIGHT: 162.92 LBS | DIASTOLIC BLOOD PRESSURE: 72 MMHG | HEIGHT: 73 IN | RESPIRATION RATE: 18 BRPM | HEART RATE: 93 BPM | OXYGEN SATURATION: 95 % | TEMPERATURE: 97.9 F

## 2024-04-17 PROBLEM — I71.40 AAA (ABDOMINAL AORTIC ANEURYSM) (CMS-HCC): Status: ACTIVE | Noted: 2024-04-17

## 2024-04-17 PROCEDURE — 71046 X-RAY EXAM CHEST 2 VIEWS: CPT | Performed by: RADIOLOGY

## 2024-04-17 PROCEDURE — 93010 ELECTROCARDIOGRAM REPORT: CPT | Performed by: INTERNAL MEDICINE

## 2024-04-17 PROCEDURE — 93281 PM DEVICE PROGR EVAL MULTI: CPT

## 2024-04-17 PROCEDURE — 97165 OT EVAL LOW COMPLEX 30 MIN: CPT | Mod: GO

## 2024-04-17 PROCEDURE — 93290 INTERROG DEV EVAL ICPMS IP: CPT | Performed by: INTERNAL MEDICINE

## 2024-04-17 PROCEDURE — 2500000006 HC RX 250 W HCPCS SELF ADMINISTERED DRUGS (ALT 637 FOR ALL PAYERS): Performed by: NURSE PRACTITIONER

## 2024-04-17 PROCEDURE — 99239 HOSP IP/OBS DSCHRG MGMT >30: CPT | Performed by: NURSE PRACTITIONER

## 2024-04-17 PROCEDURE — 2500000001 HC RX 250 WO HCPCS SELF ADMINISTERED DRUGS (ALT 637 FOR MEDICARE OP): Performed by: NURSE PRACTITIONER

## 2024-04-17 PROCEDURE — 97161 PT EVAL LOW COMPLEX 20 MIN: CPT | Mod: GP | Performed by: PHYSICAL THERAPIST

## 2024-04-17 PROCEDURE — 71046 X-RAY EXAM CHEST 2 VIEWS: CPT

## 2024-04-17 PROCEDURE — 93005 ELECTROCARDIOGRAM TRACING: CPT

## 2024-04-17 PROCEDURE — 93281 PM DEVICE PROGR EVAL MULTI: CPT | Performed by: INTERNAL MEDICINE

## 2024-04-17 PROCEDURE — 2500000004 HC RX 250 GENERAL PHARMACY W/ HCPCS (ALT 636 FOR OP/ED): Performed by: NURSE PRACTITIONER

## 2024-04-17 PROCEDURE — 7100000011 HC EXTENDED STAY RECOVERY HOURLY - NURSING UNIT

## 2024-04-17 RX ORDER — AZATHIOPRINE 50 MG/1
50 TABLET ORAL DAILY
Start: 2024-04-17 | End: 2024-05-13 | Stop reason: SDUPTHER

## 2024-04-17 RX ORDER — CEPHALEXIN 500 MG/1
500 CAPSULE ORAL EVERY 12 HOURS SCHEDULED
Start: 2024-04-17 | End: 2024-04-21

## 2024-04-17 RX ORDER — ACETAMINOPHEN 325 MG/1
650 TABLET ORAL EVERY 4 HOURS PRN
Start: 2024-04-17

## 2024-04-17 RX ADMIN — AZATHIOPRINE 50 MG: 50 TABLET ORAL at 09:10

## 2024-04-17 RX ADMIN — SPIRONOLACTONE 25 MG: 25 TABLET ORAL at 09:10

## 2024-04-17 RX ADMIN — CEPHALEXIN 500 MG: 500 CAPSULE ORAL at 09:10

## 2024-04-17 ASSESSMENT — COGNITIVE AND FUNCTIONAL STATUS - GENERAL
CLIMB 3 TO 5 STEPS WITH RAILING: TOTAL
MOVING TO AND FROM BED TO CHAIR: A LITTLE
DAILY ACTIVITIY SCORE: 15
DRESSING REGULAR LOWER BODY CLOTHING: A LOT
TURNING FROM BACK TO SIDE WHILE IN FLAT BAD: A LITTLE
WALKING IN HOSPITAL ROOM: A LOT
STANDING UP FROM CHAIR USING ARMS: A LITTLE
PERSONAL GROOMING: A LITTLE
TOILETING: A LOT
HELP NEEDED FOR BATHING: A LOT
MOVING FROM LYING ON BACK TO SITTING ON SIDE OF FLAT BED WITH BEDRAILS: A LITTLE
DRESSING REGULAR UPPER BODY CLOTHING: A LOT
MOBILITY SCORE: 15

## 2024-04-17 ASSESSMENT — ACTIVITIES OF DAILY LIVING (ADL)
LACK_OF_TRANSPORTATION: NO
BATHING_ASSISTANCE: STAND BY

## 2024-04-17 ASSESSMENT — PAIN SCALES - GENERAL
PAINLEVEL_OUTOF10: 0 - NO PAIN
PAINLEVEL_OUTOF10: 0 - NO PAIN

## 2024-04-17 NOTE — CARE PLAN
The patient's goals for the shift include  remaining free from injury and pain management that keeps discomfort at a tolerable level.    The clinical goals for the shift include Patient will remain free of injury through shift    Over the shift, the patient has made progress toward the following goals. Barriers to progression include immobilizer to left arm. Recommendations to address these barriers include keeping call light within reach and providing assistance with ambulation .

## 2024-04-17 NOTE — NURSING NOTE
Hospitalist notified concerning increase in bounding Hospitalist at bedside 1242. Awaiting further orders.

## 2024-04-17 NOTE — PROGRESS NOTES
04/17/24 1313   Discharge Planning   Living Arrangements Alone   Support Systems Children;Family members   Assistance Needed none, PTA ind with self care/ADLS/, family assists/completes IADLS, pt ambulates with rollator, has exercise  that comes to home 2x/week, pt doesn't drive d/t neuropathy- family drives, denies falls. Owns rollator, ramp, walk-in shower with built in seat and grab bars   Type of Residence Private residence  (1 level condo at [facility] with ramp entry)   Number of Stairs to Enter Residence 0   Number of Stairs Within Residence 0   Do you have animals or pets at home? No   Home or Post Acute Services Post acute facilities (Rehab/SNF/etc)   Type of Post Acute Facility Services Rehab;Skilled nursing   Patient expects to be discharged to: [facility]   Does the patient need discharge transport arranged? No  (family to transport)   Financial Resource Strain   How hard is it for you to pay for the very basics like food, housing, medical care, and heating? Not hard   Housing Stability   In the last 12 months, was there a time when you were not able to pay the mortgage or rent on time? N   In the last 12 months, how many places have you lived? 1   In the last 12 months, was there a time when you did not have a steady place to sleep or slept in a shelter (including now)? N   Transportation Needs   In the past 12 months, has lack of transportation kept you from medical appointments or from getting medications? no   In the past 12 months, has lack of transportation kept you from meetings, work, or from getting things needed for daily living? No   Patient Choice   Provider Choice list and CMS website (https://medicare.gov/care-compare#search) for post-acute Quality and Resource Measure Data were provided and reviewed with: Patient;Family   Patient / Family choosing to utilize agency / facility established prior to hospitalization No     Pt is s/p permanent pacemaker 4/17/24. [facility]  scores are PT (15) OT (15) recommending continued therapy and 24/7 assistance/supervision. Pt lives alone, DC preference is continued rehab and FOC is [facility] who confirms able to accept today. Spoke with pts son who states can provide transport to facility. Zoey Woo CNP notified, awaiting DC order and [facility]. Pts NEHEMIAH Alexandre given report number [phone]. SARAH Liu notified.

## 2024-04-17 NOTE — NURSING NOTE
Pt stated he felt better but has a heavy bounding sensation in chest. Bounding palpable and visible with exertion vital signs stable Pt asymptomatic attempted contact with Shai CARRANZA unsuccessful. MD Castellanos consulted via secure text recommendation defer to EP  EP paged concerning visible bounding will continue to monitor through shift.

## 2024-04-17 NOTE — PROGRESS NOTES
"Occupational Therapy    Evaluation/Treatment    Patient Name: Hugo Weaver \"Leeann"  MRN: [id number]  : 1931  Today's Date: 24  Time Calculation  Start Time: 949  Stop Time:   Time Calculation (min): 26 min       Assessment:  End of Session Patient Position: Up in chair, Alarm on  OT Assessment Results: Decreased ADL status, Decreased endurance, Decreased functional mobility    Plan:  Treatment Interventions: ADL retraining, Functional transfer training, Endurance training  OT Frequency: 2 times per week  OT Discharge Recommendations: Moderate intensity level of continued care  OT Recommended Transfer Status: Minimal assist  OT - OK to Discharge: Yes  Treatment Interventions: ADL retraining, Functional transfer training, Endurance training  Subjective             General:   OT Received On: 24  General  Reason for Referral: ADL impairment  Referred By: PT/OT 24   Past Medical History Relevant to Rehab: HLD, HTN, CVA, Neuorpathy, Anxiety, A fb, DM, CAD, IBS, AAA, hearing loss, obesity, Valvular heart disease, cervicalgia, heart failure, claustrophobia, CABG, ICM  Patient Position Received: Bed, 3 rail up, Alarm on (pt in L shoulder immobilizer due to PPM, requesting to amb to bathroom)  General Comment: Referred for pacemaker placement for bradycardia. INR 1.2; PPM 24    Precautions:  Hearing/Visual Limitations: [place], Glasses  Medical Precautions: Fall precautions  Post-Surgical Precautions:  (PPM precautions)           Pain:  Pain Assessment  Pain Score: 0 - No pain  Objective     Cognition:  Overall Cognitive Status: Within Functional Limits             Home Living:  Home Living Comments: Per patient report resides alone in [address] [facility] ramp entry. Walk in shower with grab bar. Grab bar near toilet    Prior Function:  Hand Dominance: Right  Prior Function Comments: Per patient report independent in mobility, ADLs and IADLs with rollator . Denies any falls past 3 months. Does " not drive Family provides transportation Patient works out with  regularly           Activities of Daily Living:   Eating Assistance: Independent  Grooming Assistance: Independent  Bathing Assistance: Stand by  UE Dressing Assistance: Minimal  LE Dressing Assistance: Minimal  Toileting Assistance with Device: Minimal  Toileting Deficit: Perineal hygiene  Functional Assistance:  (pt ambulated with min A with ww)                         Activity Tolerance:  Endurance:  (poor)           Bed Mobility/Transfers: Bed Mobility  Bed Mobility:  (Supine > sit wtih HOB 45 degrees supervised. Patient uses RUE to assist in moving.)  Transfers  Transfer:  (Sit <> stand at bed level + 1minimal assist.,patient pulls self up with rollator. Sit <> stand at toiilet with grab bar CGA. Stand > sit at recliner CGA Retopulsive Patient follows precautions well)                Balance:  Static Sitting: good  Dynamic Sitting: good -  Static Standing: fair +  Dynamic Standing: fair          Vision:Vision - Basic Assessment  Current Vision: No visual deficits               Strength:  Strength Comments: R UE 4/5 throughout, L UE NT due to PPM precautions            Extremities: RUE   RUE : Within Functional Limits and LUE   LUE:  (Shoulder not tested, AROM elbow/rwrist, hand WFL.)    Outcome Measures: [facility] Daily Activity  Putting on and taking off regular lower body clothing: A lot  Bathing (including washing, rinsing, drying): A lot  Putting on and taking off regular upper body clothing: A lot  Toileting, which includes using toilet, bedpan or urinal: A lot  Taking care of personal grooming such as brushing teeth: A little  Eating Meals: None  Daily Activity - Total Score: 15    Education Documentation  Precautions, taught by Zoey Umanzor OT at 4/17/2024 12:03 PM.  Learner: Patient  Readiness: Acceptance  Method: Explanation  Response: Verbalizes Understanding, Needs Reinforcement    ADL Training, taught by Zoey Umanzor OT at  4/17/2024 12:03 PM.  Learner: Patient  Readiness: Acceptance  Method: Explanation  Response: Verbalizes Understanding, Needs Reinforcement          EDUCATION:  Education  Education Comment: educated pt on PPM precautions    Goals:  Encounter Problems       Encounter Problems (Active)       OT Goals       Pt will verbalize and adhere to PPM precautions indep (Progressing)       Start:  04/17/24    Expected End:  05/01/24            Pt will dress UB with SBA (Progressing)       Start:  04/17/24    Expected End:  05/01/24            Pt will dress LB with SBA (Progressing)       Start:  04/17/24    Expected End:  05/01/24            Pt will transfer to bed, chair, toilet with modified indep (Progressing)       Start:  04/17/24    Expected End:  05/01/24

## 2024-04-17 NOTE — DISCHARGE SUMMARY
Discharge Diagnosis  Dilated cardiomyopathy (Multi)    Issues Requiring Follow-Up  Post BiV permanent pacemaker implant    Test Results Pending At Discharge  Pending Labs       No current pending labs.            Hospital Course   89-year-old male admitted to [facility] with symptomatic bradycardia and ischemic cardiomyopathy with ejection fraction between 35 and 50% for elective BiV PPM placement.  Patient underwent successful procedure per Dr. Gibbons, see full operative report.  Patient was monitored overnight for PT OT evaluation and possible acute rehab placement.  Chest x-ray shows normal lead and device placement.  Device check shows normal lead and device function.  Patient did complain of diaphragmatic stimulation which after device adjustment for lead vector is no longer detected.  Post procedure potential complications, activity restrictions, medications, and follow-up reviewed with patient.  PT/OT evaluation completed and patient will transfer to acute inpatient rehab.  Patient to follow-up in [place] office in 1 week for incision check and in 3 months for device check and outpatient follow-up with Dr. Gibbons.  Patient will be on Keflex 500 mg twice daily x 5 days.  He will also hold his anticoagulation x 2 days post pacemaker implant and resume on Friday, 4/19/2024.    Pertinent Physical Exam At Time of Discharge  Physical Exam  Constitutional:       Appearance: Normal appearance.   HENT:      Head: Normocephalic.   Eyes:      Conjunctiva/sclera: Conjunctivae normal.   Cardiovascular:      Rate and Rhythm: Normal rate. Rhythm irregular.      Pulses: Normal pulses.      Heart sounds: Normal heart sounds.      Comments: Telemetry shows atrial fibrillation with V paced.  Left prepectoral PPM site dressing D & I without hematoma or bleeding.  Bruising noted below pacing pacemaker site dressing.  Pulmonary:      Effort: Pulmonary effort is normal.   Musculoskeletal:          General: Normal range of motion.   Skin:     General: Skin is warm and dry.   Neurological:      Mental Status: He is alert and oriented to person, place, and time.         Home Medications     Medication List      START taking these medications     acetaminophen 325 mg tablet; Commonly known as: Tylenol; Take 2 tablets   (650 mg) by mouth every 4 hours if needed for mild pain (1 - 3) or   moderate pain (4 - 6).   cephalexin 500 mg capsule; Commonly known as: Keflex; Take 1 capsule   (500 mg) by mouth every 12 hours for 8 doses.     CHANGE how you take these medications     rivaroxaban 15 mg tablet; Commonly known as: Xarelto; Take 1 tablet (15   mg) by mouth once daily in the evening. Take with meals. Hold for 2 days   and resume on 4/19/24 Do not start before April 19, 2024.; Start taking   on: April 19, 2024; What changed: additional instructions, These   instructions start on April 19, 2024. If you are unsure what to do until   then, ask your doctor or other care provider.     CONTINUE taking these medications     ascorbic acid 1,000 mg tablet; Commonly known as: Vitamin C   azaTHIOprine 50 mg tablet; Commonly known as: Imuran; Take 1 tablet (50   mg) by mouth once daily. Take 1 tablet (50mg) every morning and 1.5   tablets (75mg) every evening.   bumetanide 2 mg tablet; Commonly known as: Bumex   enalapril 5 mg tablet; Commonly known as: Vasotec; Take 1 tablet (5 mg)   by mouth once daily at bedtime.   Fish OiL 1,000 mg (120 mg-180 mg) capsule; Generic drug: omega   3-dha-epa-fish oil   pravastatin 20 mg tablet; Commonly known as: Pravachol; Take 1 tablet   (20 mg) by mouth once daily at bedtime.   spironolactone 25 mg tablet; Commonly known as: Aldactone; Take 1 tablet   (25 mg) by mouth once daily.   tamsulosin 0.4 mg 24 hr capsule; Commonly known as: Flomax   vitamin E 180 mg (400 unit) capsule       Outpatient Follow-Up  Future Appointments   Date Time Provider Department Center   4/22/2024  9:00 AM NO  [id number] CARDIOLOGY RN 1 [id number] [place]   5/14/2024 11:15 AM Jef Jara MD [id number] [place]   7/23/2024 10:00 AM [facility] 3 [id number] [place]    7/23/2024 10:40 AM Andrez Gibbons MD [id number] [place]   11/13/2024  2:00 PM [facility] [address]   Total discharge time 40 minutes    Zoey Woo, VEL-CNP

## 2024-04-17 NOTE — PROGRESS NOTES
Physical Therapy    Physical Therapy Evaluation    Patient Name: Hugo Weaver  MRN: [id number]  Today's Date: 4/17/2024   Time Calculation  Start Time: 0940  Stop Time: 1012  Time Calculation (min): 32 min    Assessment/Plan   PT Assessment  PT Assessment Results: Decreased strength, Impaired balance, Decreased mobility, Decreased endurance, Decreased safety awareness, Impaired judgement  Rehab Prognosis: Good  Evaluation/Treatment Tolerance: Patient limited by fatigue  Assessment Comment: Patient will benefit from additional PT to increase activity tolerance and mobiltiy Continue to reinforce pacer  precautions.  End of Session Patient Position: Up in chair, Alarm on  IP OR SWING BED PT PLAN  Inpatient or Swing Bed: Inpatient  PT Plan  Treatment/Interventions: Bed mobility, Transfer training, Gait training, Strengthening, Therapeutic activity  PT Plan: Skilled PT  PT Frequency: 3 times per week  PT Discharge Recommendations:  (Continued PT with 24/7 support)  PT Recommended Transfer Status: Assist x1  PT - OK to Discharge:  (once deemed medically appropriate with 24/7 support)      Subjective   General Visit Information:  General  Reason for Referral: Impaired mobility  Referred By: PT/OT 4/16/24   Past Medical History Relevant to Rehab: HLD, HTN, CVA, Neuorpathy, Anxiety, A fb, DM, CAD, IBS, AAA, hearing loss, obesity, Valvular heart disease, cervicalgia, heart failure, claustrophobia, CABG, ICM  Patient Position Received: Bed, 3 rail up, Alarm on  General Comment: Referred for pacemaker placement for bradycardia. INR 1.2; PPM 4/16/24  Home Living:  Home Living  Home Living Comments: Per patient report resides alone in [address] [place] [address] [facility] ramp entry. Walk in shower with grab bar. Grab bar near toilet  Prior Level of Function:  Prior Function Per Pt/Caregiver Report  Hand Dominance: Right  Prior Function Comments: Per patient report independent in mobility, ADLs and IADLs with rollator . Denies any falls past 3  months. Does not drive Family provides transportation Patient works out with  regularly  Precautions:  Precautions  Hearing/Visual Limitations: [place], Glasses  Medical Precautions: Fall precautions  Post-Surgical Precautions:  (Pacemaker precautions)         Objective   Pain:  Pain Assessment  Pain Score: 0 - No pain  Cognition:  Cognition  Overall Cognitive Status: Within Functional Limits    General Assessments:  General Observation  General Observation: patient getting OOB to go to bathroom upon arrival to room. Shoulder immobiizer in place but positioned incorrectly Remoed and donned appropriately with education to paiient. Agreeable to PT/OT.       Activity Tolerance  Endurance:  (Poor)    Static Sitting Balance  Static Sitting-: Good  Dynamic Sitting Balance  Dynamic Sitting-: Good    Static Standing Balance  Static Standing-: Fair -  Dynamic Standing Balance  Dynamic Standing-: poor  Functional Assessments:  Bed Mobility  Bed Mobility:  (Supine > sit wt HOB 45 degrees supervised. Patient uses RUE to assist in moving.)    Transfers  Transfer:  (Sit <> stand at bed level + 1minimal assist.,patient pulls self up with rollator. Sit <> stand at toiilet with grab bar CGA. Stand > sit at recliner CGA Retopulsive Patient follows precautions well)    Ambulation/Gait Training  Ambulation/Gait Training Performed:  (Patient ambulated wt rollator 5' x1 and 10' x1, wrist freed up from shoulder immobilizer so able to use with rollator CGA.)  Extremity/Trunk Assessments:  RUE   RUE : Within Functional Limits  LUE   LUE:  (Shoulder not tested, elbow/rwrist, hand WFL.)  RLE   RLE :  (AROM Hip/knee/ankle  WFL MMT 4/5 grossly)  LLE   LLE :  (AROM Hip/knee/ankle WFL MMT 4/5 grossly)  Outcome Measures:  [facility] Basic Mobility  Turning from your back to your side while in a flat bed without using bedrails: A little  Moving from lying on your back to sitting on the side of a flat bed without using bedrails: A little  Moving  to and from bed to chair (including a wheelchair): A little  Standing up from a chair using your arms (e.g. wheelchair or bedside chair): A little  To walk in hospital room: A lot  Climbing 3-5 steps with railing: Total  Basic Mobility - Total Score: 15    Encounter Problems       Encounter Problems (Active)       PT Problem       Bed mobility supine <> sit independent  (Progressing)       Start:  04/17/24    Expected End:  05/01/24            Transfers sit <> stand modified independent  (Progressing)       Start:  04/17/24    Expected End:  05/01/24            Patient to ambulate 100' with rollator.  modified independent  (Progressing)       Start:  04/17/24    Expected End:  05/01/24                   Education Documentation  Precautions, taught by Ashley Saldaña PT at 4/17/2024 11:27 AM.  Learner: Patient  Readiness: Acceptance  Method: Explanation, Demonstration  Response: Demonstrated Understanding, Needs Reinforcement    Mobility Training, taught by Ashley Saldaña PT at 4/17/2024 11:27 AM.  Learner: Patient  Readiness: Acceptance  Method: Explanation, Demonstration  Response: Demonstrated Understanding, Needs Reinforcement

## 2024-04-18 ENCOUNTER — TELEPHONE (OUTPATIENT)
Dept: PRIMARY CARE | Facility: CLINIC | Age: 89
End: 2024-04-18
Payer: MEDICARE

## 2024-04-18 NOTE — TELEPHONE ENCOUNTER
FYI  Patient had a pacemaker put in on 04/16/24  And he is now in [facility] in [place]   Patient stated he was informed to let you now

## 2024-04-22 ENCOUNTER — APPOINTMENT (OUTPATIENT)
Dept: CARDIOLOGY | Facility: CLINIC | Age: 89
End: 2024-04-22
Payer: MEDICARE

## 2024-04-23 ENCOUNTER — DOCUMENTATION (OUTPATIENT)
Dept: HOME HEALTH SERVICES | Facility: HOME HEALTH | Age: 89
End: 2024-04-23
Payer: MEDICARE

## 2024-04-23 ENCOUNTER — HOME HEALTH ADMISSION (OUTPATIENT)
Dept: HOME HEALTH SERVICES | Facility: HOME HEALTH | Age: 89
End: 2024-04-23
Payer: MEDICARE

## 2024-04-23 NOTE — HH CARE COORDINATION
Home Care received a Referral for Nursing, Physical Therapy, and Occupational Therapy. We have processed the referral for a Start of Care on 24-48 HOUR POST DC .     If you have any questions or concerns, please feel free to contact us at [phone]. Follow the prompts, enter your five digit zip code, and you will be directed to your care team on WEST 2.

## 2024-04-25 LAB
ATRIAL RATE: 64 BPM
ATRIAL RATE: 78 BPM
ATRIAL RATE: 84 BPM
Q ONSET: 187 MS
Q ONSET: 188 MS
Q ONSET: 206 MS
QRS COUNT: 12 BEATS
QRS COUNT: 14 BEATS
QRS COUNT: 14 BEATS
QRS DURATION: 162 MS
QRS DURATION: 176 MS
QRS DURATION: 178 MS
QT INTERVAL: 424 MS
QT INTERVAL: 428 MS
QT INTERVAL: 460 MS
QTC CALCULATION(BAZETT): 454 MS
QTC CALCULATION(BAZETT): 526 MS
QTC CALCULATION(BAZETT): 543 MS
QTC FREDERICIA: 444 MS
QTC FREDERICIA: 492 MS
QTC FREDERICIA: 514 MS
R AXIS: -58 DEGREES
R AXIS: 120 DEGREES
R AXIS: 150 DEGREES
T AXIS: -45 DEGREES
T AXIS: -57 DEGREES
T AXIS: 101 DEGREES
T OFFSET: 401 MS
T OFFSET: 418 MS
T OFFSET: 418 MS
VENTRICULAR RATE: 69 BPM
VENTRICULAR RATE: 84 BPM
VENTRICULAR RATE: 91 BPM

## 2024-04-26 ENCOUNTER — HOME CARE VISIT (OUTPATIENT)
Dept: HOME HEALTH SERVICES | Facility: HOME HEALTH | Age: 89
End: 2024-04-26
Payer: MEDICARE

## 2024-04-26 ENCOUNTER — HOSPITAL ENCOUNTER (OUTPATIENT)
Dept: CARDIOLOGY | Facility: HOSPITAL | Age: 89
Discharge: HOME | End: 2024-04-26
Payer: MEDICARE

## 2024-04-26 DIAGNOSIS — Z95.0 CARDIAC PACEMAKER IN SITU: Primary | ICD-10-CM

## 2024-04-26 DIAGNOSIS — I42.0 PRIMARY IDIOPATHIC DILATED CARDIOMYOPATHY (MULTI): ICD-10-CM

## 2024-04-26 DIAGNOSIS — I48.0 PAROXYSMAL ATRIAL FIBRILLATION (MULTI): ICD-10-CM

## 2024-04-26 DIAGNOSIS — Z95.0 CARDIAC PACEMAKER IN SITU: ICD-10-CM

## 2024-04-26 PROCEDURE — G0299 HHS/HOSPICE OF RN EA 15 MIN: HCPCS | Mod: HHH

## 2024-04-26 PROCEDURE — 1090000002 HH PPS REVENUE DEBIT

## 2024-04-26 PROCEDURE — 169592 NO-PAY CLAIM PROCEDURE

## 2024-04-26 PROCEDURE — 1090000001 HH PPS REVENUE CREDIT

## 2024-04-26 PROCEDURE — 0023 HH SOC

## 2024-04-27 VITALS
SYSTOLIC BLOOD PRESSURE: 118 MMHG | RESPIRATION RATE: 20 BRPM | TEMPERATURE: 97.6 F | OXYGEN SATURATION: 98 % | HEART RATE: 68 BPM | DIASTOLIC BLOOD PRESSURE: 66 MMHG

## 2024-04-27 PROCEDURE — 1090000001 HH PPS REVENUE CREDIT

## 2024-04-27 PROCEDURE — 1090000002 HH PPS REVENUE DEBIT

## 2024-04-27 ASSESSMENT — ENCOUNTER SYMPTOMS
HIGHEST PAIN SEVERITY IN PAST 24 HOURS: 2/10
DEPRESSION: 0
LOSS OF SENSATION IN FEET: 0
LOWEST PAIN SEVERITY IN PAST 24 HOURS: 0/10
PAIN LOCATION - PAIN FREQUENCY: INTERMITTENT
OCCASIONAL FEELINGS OF UNSTEADINESS: 0
SUBJECTIVE PAIN PROGRESSION: UNCHANGED
PAIN LOCATION - PAIN SEVERITY: 2/10
PAIN LOCATION: BACK
CHANGE IN APPETITE: UNCHANGED
PAIN SEVERITY GOAL: 0/10
PERSON REPORTING PAIN: PATIENT
PAIN LOCATION - PAIN QUALITY: ACHES
LAST BOWEL MOVEMENT: 66956
PAIN: 1
APPETITE LEVEL: FAIR

## 2024-04-27 ASSESSMENT — PAIN SCALES - PAIN ASSESSMENT IN ADVANCED DEMENTIA (PAINAD)
BODYLANGUAGE: 0
CONSOLABILITY: 0 - NO NEED TO CONSOLE.
CONSOLABILITY: 0
TOTALSCORE: 0
BREATHING: 0
FACIALEXPRESSION: 0 - SMILING OR INEXPRESSIVE.
NEGVOCALIZATION: 0 - NONE.
BODYLANGUAGE: 0 - RELAXED.
NEGVOCALIZATION: 0
FACIALEXPRESSION: 0

## 2024-04-27 ASSESSMENT — ACTIVITIES OF DAILY LIVING (ADL)
OASIS_M1830: 05
ENTERING_EXITING_HOME: MODERATE ASSIST

## 2024-04-27 NOTE — HOME HEALTH
Skilled nursing visit for admission to homecare services assessment teaching of medications disease process and incision care. Pt. tolerated procedures well. Pt resides in one level home with support of adult children. Left upper chest pacemaker incision intact without redness edema or drainage. JAMES. Pt accepts SN PT and OT services.

## 2024-04-28 PROCEDURE — 1090000001 HH PPS REVENUE CREDIT

## 2024-04-28 PROCEDURE — 1090000002 HH PPS REVENUE DEBIT

## 2024-04-29 ENCOUNTER — TELEPHONE (OUTPATIENT)
Dept: PRIMARY CARE | Facility: CLINIC | Age: 89
End: 2024-04-29
Payer: MEDICARE

## 2024-04-29 ENCOUNTER — HOME CARE VISIT (OUTPATIENT)
Dept: HOME HEALTH SERVICES | Facility: HOME HEALTH | Age: 89
End: 2024-04-29
Payer: MEDICARE

## 2024-04-29 PROCEDURE — G0151 HHCP-SERV OF PT,EA 15 MIN: HCPCS | Mod: HHH

## 2024-04-29 PROCEDURE — 1090000002 HH PPS REVENUE DEBIT

## 2024-04-29 PROCEDURE — 1090000001 HH PPS REVENUE CREDIT

## 2024-04-29 SDOH — HEALTH STABILITY: PHYSICAL HEALTH: EXERCISE TYPE: BLE PER HEP

## 2024-04-29 ASSESSMENT — ACTIVITIES OF DAILY LIVING (ADL)
AMBULATION ASSISTANCE: 1
PHYSICAL TRANSFERS ASSESSED: 1
CURRENT_FUNCTION: STAND BY ASSIST
AMBULATION ASSISTANCE: STAND BY ASSIST
AMBULATION ASSISTANCE ON FLAT SURFACES: 1
AMBULATION_DISTANCE/DURATION_TOLERATED: 50'

## 2024-04-29 ASSESSMENT — ENCOUNTER SYMPTOMS
DENIES PAIN: 1
PERSON REPORTING PAIN: PATIENT
MUSCLE WEAKNESS: 1

## 2024-04-29 NOTE — TELEPHONE ENCOUNTER
Patient called stating that he had a pacemaker placed several weeks ago.  While he was in rehab, the dietician told him to contact Dr. Barton in regards to being prescribed digestive enzymes. If prescribed please send to [facility] on [address] In [place]. Or if he can get OTC?  Please advise patient. Phone #[phone]

## 2024-04-30 ENCOUNTER — HOME CARE VISIT (OUTPATIENT)
Dept: HOME HEALTH SERVICES | Facility: HOME HEALTH | Age: 89
End: 2024-04-30
Payer: MEDICARE

## 2024-04-30 PROCEDURE — 1090000001 HH PPS REVENUE CREDIT

## 2024-04-30 PROCEDURE — G0152 HHCP-SERV OF OT,EA 15 MIN: HCPCS | Mod: HHH | Performed by: OCCUPATIONAL THERAPIST

## 2024-04-30 PROCEDURE — 1090000002 HH PPS REVENUE DEBIT

## 2024-04-30 ASSESSMENT — ACTIVITIES OF DAILY LIVING (ADL)
DRESSING_LB_CURRENT_FUNCTION: INDEPENDENT
PHYSICAL TRANSFERS ASSESSED: 1
AMBULATION ASSISTANCE: SUPERVISION
CURRENT_FUNCTION: SUPERVISION
BATHING_CURRENT_FUNCTION: SUPERVISION
TOILETING: 1
DRESSING_UB_CURRENT_FUNCTION: INDEPENDENT
BATHING ASSESSED: 1
TOILETING: SUPERVISION
AMBULATION ASSISTANCE: 1

## 2024-04-30 ASSESSMENT — ENCOUNTER SYMPTOMS: DENIES PAIN: 1

## 2024-05-01 ENCOUNTER — HOME CARE VISIT (OUTPATIENT)
Dept: HOME HEALTH SERVICES | Facility: HOME HEALTH | Age: 89
End: 2024-05-01
Payer: MEDICARE

## 2024-05-01 VITALS
RESPIRATION RATE: 20 BRPM | OXYGEN SATURATION: 99 % | DIASTOLIC BLOOD PRESSURE: 70 MMHG | TEMPERATURE: 97.3 F | SYSTOLIC BLOOD PRESSURE: 134 MMHG | HEART RATE: 79 BPM

## 2024-05-01 PROCEDURE — 1090000001 HH PPS REVENUE CREDIT

## 2024-05-01 PROCEDURE — 1090000002 HH PPS REVENUE DEBIT

## 2024-05-01 PROCEDURE — G0299 HHS/HOSPICE OF RN EA 15 MIN: HCPCS | Mod: HHH

## 2024-05-01 SDOH — ECONOMIC STABILITY: FOOD INSECURITY: MEALS PER DAY: 3

## 2024-05-01 SDOH — ECONOMIC STABILITY: GENERAL

## 2024-05-01 ASSESSMENT — ENCOUNTER SYMPTOMS
CHANGE IN APPETITE: UNCHANGED
PAIN LOCATION: RIGHT LEG
PERSON REPORTING PAIN: PATIENT
PAIN: 1
LAST BOWEL MOVEMENT: 66961
DEPRESSION: 0
PAIN LOCATION - PAIN FREQUENCY: CONSTANT
PAIN LOCATION - PAIN FREQUENCY: CONSTANT
LOWEST PAIN SEVERITY IN PAST 24 HOURS: 3/10
PAIN LOCATION - PAIN QUALITY: NEUROPATHY
PAIN LOCATION - PAIN QUALITY: NEUROPATHY
BOWEL PATTERN NORMAL: 1
PAIN LOCATION: LEFT LEG
PAIN LOCATION - PAIN SEVERITY: 4/10
PAIN LOCATION - EXACERBATING FACTORS: NEUROPATHY
PAIN SEVERITY GOAL: 1/10
HIGHEST PAIN SEVERITY IN PAST 24 HOURS: 5/10
MUSCLE WEAKNESS: 1
FATIGUES EASILY: 1
PAIN LOCATION - PAIN SEVERITY: 4/10
APPETITE LEVEL: GOOD
LOSS OF SENSATION IN FEET: 1
PAIN LOCATION - EXACERBATING FACTORS: NEUROPATHY
SUBJECTIVE PAIN PROGRESSION: UNCHANGED
OCCASIONAL FEELINGS OF UNSTEADINESS: 1
FATIGUE: 1

## 2024-05-01 ASSESSMENT — PAIN SCALES - PAIN ASSESSMENT IN ADVANCED DEMENTIA (PAINAD)
BODYLANGUAGE: 0 - RELAXED.
TOTALSCORE: 0
FACIALEXPRESSION: 0 - SMILING OR INEXPRESSIVE.
NEGVOCALIZATION: 0
BREATHING: 0
NEGVOCALIZATION: 0 - NONE.
CONSOLABILITY: 0
BODYLANGUAGE: 0
FACIALEXPRESSION: 0
CONSOLABILITY: 0 - NO NEED TO CONSOLE.

## 2024-05-01 ASSESSMENT — ACTIVITIES OF DAILY LIVING (ADL)
AMBULATION ASSISTANCE: 1
AMBULATION ASSISTANCE: STAND BY ASSIST
MONEY MANAGEMENT (EXPENSES/BILLS): INDEPENDENT

## 2024-05-02 ENCOUNTER — HOME CARE VISIT (OUTPATIENT)
Dept: HOME HEALTH SERVICES | Facility: HOME HEALTH | Age: 89
End: 2024-05-02
Payer: MEDICARE

## 2024-05-02 VITALS — OXYGEN SATURATION: 96 %

## 2024-05-02 PROCEDURE — 1090000001 HH PPS REVENUE CREDIT

## 2024-05-02 PROCEDURE — G0157 HHC PT ASSISTANT EA 15: HCPCS | Mod: HHH

## 2024-05-02 PROCEDURE — 1090000002 HH PPS REVENUE DEBIT

## 2024-05-02 ASSESSMENT — ENCOUNTER SYMPTOMS
DENIES PAIN: 1
PERSON REPORTING PAIN: PATIENT

## 2024-05-03 PROCEDURE — 1090000002 HH PPS REVENUE DEBIT

## 2024-05-03 PROCEDURE — 1090000001 HH PPS REVENUE CREDIT

## 2024-05-04 PROCEDURE — 1090000002 HH PPS REVENUE DEBIT

## 2024-05-04 PROCEDURE — 1090000001 HH PPS REVENUE CREDIT

## 2024-05-04 PROCEDURE — G0180 MD CERTIFICATION HHA PATIENT: HCPCS | Performed by: FAMILY MEDICINE

## 2024-05-05 PROCEDURE — 1090000001 HH PPS REVENUE CREDIT

## 2024-05-05 PROCEDURE — 1090000002 HH PPS REVENUE DEBIT

## 2024-05-06 PROCEDURE — 1090000001 HH PPS REVENUE CREDIT

## 2024-05-06 PROCEDURE — 1090000002 HH PPS REVENUE DEBIT

## 2024-05-07 ENCOUNTER — HOME CARE VISIT (OUTPATIENT)
Dept: HOME HEALTH SERVICES | Facility: HOME HEALTH | Age: 89
End: 2024-05-07
Payer: MEDICARE

## 2024-05-07 VITALS — OXYGEN SATURATION: 96 %

## 2024-05-07 PROCEDURE — G0152 HHCP-SERV OF OT,EA 15 MIN: HCPCS | Mod: HHH | Performed by: OCCUPATIONAL THERAPIST

## 2024-05-07 PROCEDURE — 1090000001 HH PPS REVENUE CREDIT

## 2024-05-07 PROCEDURE — 1090000002 HH PPS REVENUE DEBIT

## 2024-05-07 PROCEDURE — G0157 HHC PT ASSISTANT EA 15: HCPCS | Mod: HHH

## 2024-05-07 ASSESSMENT — ENCOUNTER SYMPTOMS
DENIES PAIN: 1
DENIES PAIN: 1
PERSON REPORTING PAIN: PATIENT

## 2024-05-08 ENCOUNTER — HOME CARE VISIT (OUTPATIENT)
Dept: HOME HEALTH SERVICES | Facility: HOME HEALTH | Age: 89
End: 2024-05-08
Payer: MEDICARE

## 2024-05-08 VITALS
TEMPERATURE: 98.2 F | OXYGEN SATURATION: 98 % | SYSTOLIC BLOOD PRESSURE: 122 MMHG | RESPIRATION RATE: 20 BRPM | DIASTOLIC BLOOD PRESSURE: 60 MMHG | HEART RATE: 74 BPM

## 2024-05-08 PROCEDURE — G0299 HHS/HOSPICE OF RN EA 15 MIN: HCPCS | Mod: HHH

## 2024-05-08 PROCEDURE — 1090000001 HH PPS REVENUE CREDIT

## 2024-05-08 PROCEDURE — 1090000002 HH PPS REVENUE DEBIT

## 2024-05-08 SDOH — ECONOMIC STABILITY: GENERAL

## 2024-05-08 ASSESSMENT — ACTIVITIES OF DAILY LIVING (ADL)
FEEDING: INDEPENDENT
PHYSICAL TRANSFERS ASSESSED: 1
CURRENT_FUNCTION: STAND BY ASSIST
MONEY MANAGEMENT (EXPENSES/BILLS): INDEPENDENT
AMBULATION ASSISTANCE: STAND BY ASSIST
FEEDING ASSESSED: 1
AMBULATION ASSISTANCE: 1

## 2024-05-08 ASSESSMENT — ENCOUNTER SYMPTOMS
LAST BOWEL MOVEMENT: 66968
BOWEL PATTERN NORMAL: 1
APPETITE LEVEL: GOOD
LOSS OF SENSATION IN FEET: 1
PERSON REPORTING PAIN: PATIENT
DENIES PAIN: 1
HYPERTENSION: 1
CHANGE IN APPETITE: UNCHANGED
FATIGUE: 1
MUSCLE WEAKNESS: 1
DEPRESSION: 0
FATIGUES EASILY: 1
OCCASIONAL FEELINGS OF UNSTEADINESS: 1

## 2024-05-08 ASSESSMENT — PAIN SCALES - PAIN ASSESSMENT IN ADVANCED DEMENTIA (PAINAD)
NEGVOCALIZATION: 0
NEGVOCALIZATION: 0 - NONE.
TOTALSCORE: 0
FACIALEXPRESSION: 0 - SMILING OR INEXPRESSIVE.
CONSOLABILITY: 0
CONSOLABILITY: 0 - NO NEED TO CONSOLE.
BODYLANGUAGE: 0 - RELAXED.
BODYLANGUAGE: 0
FACIALEXPRESSION: 0
BREATHING: 0

## 2024-05-09 ENCOUNTER — LAB (OUTPATIENT)
Dept: LAB | Facility: LAB | Age: 89
End: 2024-05-09
Payer: MEDICARE

## 2024-05-09 DIAGNOSIS — R71.0 DROP IN HEMATOCRIT: ICD-10-CM

## 2024-05-09 DIAGNOSIS — Z79.899 ENCOUNTER FOR LONG-TERM (CURRENT) USE OF HIGH-RISK MEDICATION: ICD-10-CM

## 2024-05-09 DIAGNOSIS — L12.0 BULLOUS PEMPHIGOID (MULTI): ICD-10-CM

## 2024-05-09 LAB
ALBUMIN SERPL BCP-MCNC: 4.1 G/DL (ref 3.4–5)
ALP SERPL-CCNC: 39 U/L (ref 33–136)
ALT SERPL W P-5'-P-CCNC: 10 U/L (ref 10–52)
ANION GAP SERPL CALC-SCNC: 18 MMOL/L (ref 10–20)
AST SERPL W P-5'-P-CCNC: 14 U/L (ref 9–39)
BASOPHILS # BLD AUTO: 0.06 X10*3/UL (ref 0–0.1)
BASOPHILS NFR BLD AUTO: 1 %
BILIRUB SERPL-MCNC: 0.9 MG/DL (ref 0–1.2)
BUN SERPL-MCNC: 39 MG/DL (ref 6–23)
CALCIUM SERPL-MCNC: 9.4 MG/DL (ref 8.6–10.6)
CHLORIDE SERPL-SCNC: 103 MMOL/L (ref 98–107)
CO2 SERPL-SCNC: 21 MMOL/L (ref 21–32)
CREAT SERPL-MCNC: 1.47 MG/DL (ref 0.5–1.3)
EGFRCR SERPLBLD CKD-EPI 2021: 44 ML/MIN/1.73M*2
EOSINOPHIL # BLD AUTO: 0.38 X10*3/UL (ref 0–0.4)
EOSINOPHIL NFR BLD AUTO: 6.4 %
ERYTHROCYTE [DISTWIDTH] IN BLOOD BY AUTOMATED COUNT: 14.1 % (ref 11.5–14.5)
GLUCOSE SERPL-MCNC: 98 MG/DL (ref 74–99)
HCT VFR BLD AUTO: 40.7 % (ref 41–52)
HGB BLD-MCNC: 12.8 G/DL (ref 13.5–17.5)
IMM GRANULOCYTES # BLD AUTO: 0.06 X10*3/UL (ref 0–0.5)
IMM GRANULOCYTES NFR BLD AUTO: 1 % (ref 0–0.9)
LYMPHOCYTES # BLD AUTO: 0.9 X10*3/UL (ref 0.8–3)
LYMPHOCYTES NFR BLD AUTO: 15.3 %
MCH RBC QN AUTO: 30.9 PG (ref 26–34)
MCHC RBC AUTO-ENTMCNC: 31.4 G/DL (ref 32–36)
MCV RBC AUTO: 98 FL (ref 80–100)
MONOCYTES # BLD AUTO: 0.63 X10*3/UL (ref 0.05–0.8)
MONOCYTES NFR BLD AUTO: 10.7 %
NEUTROPHILS # BLD AUTO: 3.87 X10*3/UL (ref 1.6–5.5)
NEUTROPHILS NFR BLD AUTO: 65.6 %
NRBC BLD-RTO: 0 /100 WBCS (ref 0–0)
PLATELET # BLD AUTO: 190 X10*3/UL (ref 150–450)
POTASSIUM SERPL-SCNC: 4.5 MMOL/L (ref 3.5–5.3)
PROT SERPL-MCNC: 6.5 G/DL (ref 6.4–8.2)
RBC # BLD AUTO: 4.14 X10*6/UL (ref 4.5–5.9)
SODIUM SERPL-SCNC: 137 MMOL/L (ref 136–145)
WBC # BLD AUTO: 5.9 X10*3/UL (ref 4.4–11.3)

## 2024-05-09 PROCEDURE — 1090000001 HH PPS REVENUE CREDIT

## 2024-05-09 PROCEDURE — 1090000002 HH PPS REVENUE DEBIT

## 2024-05-09 PROCEDURE — 85025 COMPLETE CBC W/AUTO DIFF WBC: CPT

## 2024-05-09 PROCEDURE — 80053 COMPREHEN METABOLIC PANEL: CPT

## 2024-05-09 PROCEDURE — 36415 COLL VENOUS BLD VENIPUNCTURE: CPT

## 2024-05-10 PROCEDURE — 1090000001 HH PPS REVENUE CREDIT

## 2024-05-10 PROCEDURE — 1090000002 HH PPS REVENUE DEBIT

## 2024-05-11 PROCEDURE — 1090000002 HH PPS REVENUE DEBIT

## 2024-05-11 PROCEDURE — 1090000001 HH PPS REVENUE CREDIT

## 2024-05-12 PROCEDURE — 1090000002 HH PPS REVENUE DEBIT

## 2024-05-12 PROCEDURE — 1090000001 HH PPS REVENUE CREDIT

## 2024-05-13 ENCOUNTER — TELEPHONE (OUTPATIENT)
Dept: DERMATOLOGY | Facility: CLINIC | Age: 89
End: 2024-05-13
Payer: MEDICARE

## 2024-05-13 DIAGNOSIS — L12.0 BULLOUS PEMPHIGOID (MULTI): ICD-10-CM

## 2024-05-13 PROCEDURE — 1090000001 HH PPS REVENUE CREDIT

## 2024-05-13 PROCEDURE — 1090000002 HH PPS REVENUE DEBIT

## 2024-05-13 NOTE — TELEPHONE ENCOUNTER
Pt needs refill for Azathioprine 50 mg , and needs more leary 30 day supply pt takes 21?2 pills daily sent to [facility]

## 2024-05-14 ENCOUNTER — APPOINTMENT (OUTPATIENT)
Dept: CARDIOLOGY | Facility: CLINIC | Age: 89
End: 2024-05-14
Payer: MEDICARE

## 2024-05-14 ENCOUNTER — HOME CARE VISIT (OUTPATIENT)
Dept: HOME HEALTH SERVICES | Facility: HOME HEALTH | Age: 89
End: 2024-05-14
Payer: MEDICARE

## 2024-05-14 PROCEDURE — 1090000002 HH PPS REVENUE DEBIT

## 2024-05-14 PROCEDURE — 1090000001 HH PPS REVENUE CREDIT

## 2024-05-14 RX ORDER — AZATHIOPRINE 50 MG/1
TABLET ORAL
Qty: 135 TABLET | Refills: 3 | Status: SHIPPED | OUTPATIENT
Start: 2024-05-14

## 2024-05-15 ENCOUNTER — HOME CARE VISIT (OUTPATIENT)
Dept: HOME HEALTH SERVICES | Facility: HOME HEALTH | Age: 89
End: 2024-05-15
Payer: MEDICARE

## 2024-05-15 VITALS
DIASTOLIC BLOOD PRESSURE: 78 MMHG | OXYGEN SATURATION: 100 % | TEMPERATURE: 97.5 F | HEART RATE: 87 BPM | SYSTOLIC BLOOD PRESSURE: 142 MMHG

## 2024-05-15 PROCEDURE — 1090000002 HH PPS REVENUE DEBIT

## 2024-05-15 PROCEDURE — G0299 HHS/HOSPICE OF RN EA 15 MIN: HCPCS | Mod: HHH

## 2024-05-15 PROCEDURE — G0151 HHCP-SERV OF PT,EA 15 MIN: HCPCS | Mod: HHH

## 2024-05-15 PROCEDURE — 1090000001 HH PPS REVENUE CREDIT

## 2024-05-15 SDOH — HEALTH STABILITY: PHYSICAL HEALTH: EXERCISE TYPE: BLE PER HEP

## 2024-05-15 ASSESSMENT — ENCOUNTER SYMPTOMS
PERSON REPORTING PAIN: PATIENT
DENIES PAIN: 1
DENIES PAIN: 1
PERSON REPORTING PAIN: PATIENT

## 2024-05-15 ASSESSMENT — ACTIVITIES OF DAILY LIVING (ADL)
CURRENT_FUNCTION: INDEPENDENT
AMBULATION ASSISTANCE ON FLAT SURFACES: 1
AMBULATION ASSISTANCE: 1
OASIS_M1830: 02
AMBULATION_DISTANCE/DURATION_TOLERATED: 150'
HOME_HEALTH_OASIS: 00
PHYSICAL TRANSFERS ASSESSED: 1
AMBULATION ASSISTANCE: INDEPENDENT

## 2024-05-29 ENCOUNTER — HOSPITAL ENCOUNTER (OUTPATIENT)
Dept: CARDIOLOGY | Facility: HOSPITAL | Age: 89
Discharge: HOME | End: 2024-05-29
Payer: MEDICARE

## 2024-05-29 DIAGNOSIS — I42.0 PRIMARY IDIOPATHIC DILATED CARDIOMYOPATHY (MULTI): ICD-10-CM

## 2024-05-29 DIAGNOSIS — I48.0 PAROXYSMAL ATRIAL FIBRILLATION (MULTI): ICD-10-CM

## 2024-05-29 DIAGNOSIS — Z95.0 CARDIAC PACEMAKER IN SITU: ICD-10-CM

## 2024-05-30 ENCOUNTER — TELEPHONE (OUTPATIENT)
Dept: PRIMARY CARE | Facility: CLINIC | Age: 89
End: 2024-05-30
Payer: MEDICARE

## 2024-05-30 DIAGNOSIS — M25.569 KNEE PAIN, UNSPECIFIED CHRONICITY, UNSPECIFIED LATERALITY: Primary | ICD-10-CM

## 2024-05-30 NOTE — TELEPHONE ENCOUNTER
Patient called in initially asking where does Dr. Barton refer to for knee pain.  [facility]  I looked in his chart and he does not have a Orthopedic referral.  Is Dr. Barton able to do a virtual / phone appointment to discuss what he may need to have done and place a referral if necessary?

## 2024-05-30 NOTE — TELEPHONE ENCOUNTER
Spoke to the patient in regards to him having a Orthopedic Referral in his chart.  He had asked and was given [facility] phone number ([phone]) so that he can schedule his own appointment, due to his son having to drive him.

## 2024-06-05 ENCOUNTER — OFFICE VISIT (OUTPATIENT)
Dept: ORTHOPEDIC SURGERY | Facility: CLINIC | Age: 89
End: 2024-06-05
Payer: MEDICARE

## 2024-06-05 ENCOUNTER — CLINICAL SUPPORT (OUTPATIENT)
Dept: ORTHOPEDIC SURGERY | Facility: CLINIC | Age: 89
End: 2024-06-05
Payer: MEDICARE

## 2024-06-05 DIAGNOSIS — M25.569 KNEE PAIN, UNSPECIFIED CHRONICITY, UNSPECIFIED LATERALITY: ICD-10-CM

## 2024-06-05 DIAGNOSIS — M17.12 PRIMARY OSTEOARTHRITIS OF LEFT KNEE: ICD-10-CM

## 2024-06-05 DIAGNOSIS — M25.562 LEFT KNEE PAIN, UNSPECIFIED CHRONICITY: ICD-10-CM

## 2024-06-05 PROCEDURE — 1160F RVW MEDS BY RX/DR IN RCRD: CPT | Performed by: FAMILY MEDICINE

## 2024-06-05 PROCEDURE — 1159F MED LIST DOCD IN RCRD: CPT | Performed by: FAMILY MEDICINE

## 2024-06-05 PROCEDURE — 99204 OFFICE O/P NEW MOD 45 MIN: CPT | Performed by: FAMILY MEDICINE

## 2024-06-05 PROCEDURE — 1036F TOBACCO NON-USER: CPT | Performed by: FAMILY MEDICINE

## 2024-06-05 ASSESSMENT — PAIN SCALES - GENERAL: PAINLEVEL_OUTOF10: 10 - WORST POSSIBLE PAIN

## 2024-06-05 ASSESSMENT — PAIN DESCRIPTION - DESCRIPTORS: DESCRIPTORS: OTHER (COMMENT)

## 2024-06-05 ASSESSMENT — PAIN - FUNCTIONAL ASSESSMENT: PAIN_FUNCTIONAL_ASSESSMENT: 0-10

## 2024-06-05 NOTE — PROGRESS NOTES
NPV L knee pain  Chief Complaint: Pain of the Left Knee  History of Present Illness:  Encounter date: 06/05/2024  Hugo Weaver is a 89 y.o. male who presents today for evaluation of left knee pain and swelling.    He states has been ongoing for years.  He does not remember any injury or trauma prior to the onset of swelling.  His swelling is worse in the morning and he describes stiffness.  He does not have any pain.  Has not noticed any warmth, erythema.  He walks at least 1 mile daily with the assistance of a rollator. He does not have any pain today. 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 (CMS-HCC) 11/13/2023    Chronic systolic heart failure (Multi) 09/07/2023    Claustrophobia 09/07/2023    IBS (irritable bowel syndrome) 09/07/2023    Abnormal TSH 09/07/2023    Neuropathy 09/07/2023    Rhinitis 09/07/2023    Bullous eruption 07/18/2023    Actinic keratosis 07/18/2023    Lichen simplex chronicus 07/18/2023    Rash and other nonspecific skin eruption 07/18/2023    Other follicular cysts of the skin and subcutaneous tissue 07/18/2023    Testosterone deficiency 03/12/2023    Allergic rhinosinusitis 03/12/2023    Alternating constipation and diarrhea 03/12/2023    Anxiety 03/12/2023    Atrial fibrillation (Multi) 03/12/2023    Bilateral sensorineural hearing loss 03/12/2023    Benign prostate hyperplasia 03/12/2023    Cervicalgia 03/12/2023    HLD (hyperlipidemia) 03/12/2023    Cold sore 03/12/2023    Chronic sinusitis of both maxillary sinuses 03/12/2023    Chronic kidney disease,  stage III (moderate) (Multi) 03/12/2023    Chronic diarrhea 03/12/2023    Left bundle-branch block 03/12/2023    Peripheral neuropathy 03/12/2023    Pemphigus (Multi) 03/12/2023    Osteopenia 03/12/2023    Mitral insufficiency 03/12/2023    Hypomagnesemia 03/12/2023    Vitamin D deficiency 03/12/2023    Sick sinus syndrome (Multi) 03/12/2023    Retinal vessel occlusion 03/12/2023    Hemorrhoid 03/12/2023    Trapezius muscle strain 03/12/2023    Nuclear senile cataract 07/07/2017    Combined form of senile cataract of both eyes 06/29/2017    Papilloma of eyelid 06/29/2017    Ptosis of both eyelids 06/29/2017    Combined senile cataract 11/24/2015    H/O: stroke 11/24/2015    Myopia 11/24/2015    Presbyopia 11/24/2015    Regular astigmatism 11/24/2015    Benign essential hypertension 05/31/2015    Bullous pemphigoid (Multi) 05/31/2015    Dilated cardiomyopathy (Multi) 04/15/2024    Symptomatic bradycardia 01/17/2024       Past Medical History  Past Medical History:   Diagnosis Date    Atrial fibrillation (Multi)     Cataract     Chronic kidney disease     Coronary artery disease     Diabetes mellitus (Multi)     Dyspnea, unspecified     Dyspnea    Heart disease     HL (hearing loss)     Hyperlipidemia     Hypertension     Ischemic cardiomyopathy     Other specified postprocedural states     History of endoscopy    Personal history of other diseases of the circulatory system     History of aortic valve insufficiency    Personal history of other diseases of the circulatory system     History of mitral valve insufficiency    Personal history of other diseases of the circulatory system     Personal history of coronary atherosclerosis    Personal history of other diseases of the digestive system 09/27/2018    History of biliary colic    Personal history of other endocrine, nutritional and metabolic disease     History of hyperlipidemia    Personal history of other endocrine, nutritional and metabolic disease     History of  obesity    Personal history of other endocrine, nutritional and metabolic disease     History of type 1 diabetes mellitus    Personal history of other medical treatment     History of echocardiogram    Personal history of other specified conditions 12/30/2015    History of edema    Valvular heart disease        Past Surgical History  Past Surgical History:   Procedure Laterality Date    CARDIAC ELECTROPHYSIOLOGY PROCEDURE N/A 4/16/2024    Procedure: PPM BIV Implant;  Surgeon: Andrez Gibbons MD;  Location: [facility];  Service: Electrophysiology;  Laterality: N/A;    CHOLECYSTECTOMY  10/2018    CORONARY ARTERY BYPASS GRAFT  10/2005    CT ABDOMEN PELVIS ANGIOGRAM W AND/OR WO IV CONTRAST  03/18/2020    CT ABDOMEN PELVIS ANGIOGRAM W AND/OR WO IV CONTRAST 3/18/2020 [facility]    OTHER SURGICAL HISTORY  11/17/2014    Cardiac Catheter His Ablation    OTHER SURGICAL HISTORY  11/05/2018    Cholecystectomy laparoscopic    OTHER SURGICAL HISTORY  12/03/2019    Coronary artery bypass graft    OTHER SURGICAL HISTORY  07/25/2019    Gallbladder surgery       Social History  Social History     Socioeconomic History    Marital status:      Spouse name: Not on file    Number of children: Not on file    Years of education: Not on file    Highest education level: Not on file   Occupational History    Not on file   Tobacco Use    Smoking status: Former     Types: Cigarettes    Smokeless tobacco: Never   Vaping Use    Vaping status: Never Used   Substance and Sexual Activity    Alcohol use: Not Currently    Drug use: Never    Sexual activity: Never   Other Topics Concern    Not on file   Social History Narrative    Not on file     Social Determinants of Health     Financial Resource Strain: Low Risk  (4/17/2024)    Overall Financial Resource Strain (CARDIA)     Difficulty of Paying Living Expenses: Not hard at all   Food Insecurity: Not on file   Transportation Needs: No Transportation Needs (5/15/2024)     OASIS : Transportation     Lack of Transportation (Medical): No     Lack of Transportation (Non-Medical): No     Patient Unable or Declines to Respond: No   Physical Activity: Not on file   Stress: Not on file   Social Connections: Feeling Socially Integrated (5/15/2024)    OASIS : Social Isolation     Frequency of experiencing loneliness or isolation: Never   Intimate Partner Violence: Not on file   Housing Stability: Low Risk  (4/17/2024)    Housing Stability Vital Sign     Unable to Pay for Housing in the Last Year: No     Number of Places Lived in the Last Year: 1     Unstable Housing in the Last Year: No       Allergies  Allergies   Allergen Reactions    Atorvastatin Unknown    Simvastatin Unknown       Medications  Current Outpatient Medications   Medication Sig Dispense Refill    acetaminophen (Tylenol) 325 mg tablet Take 2 tablets (650 mg) by mouth every 4 hours if needed for mild pain (1 - 3) or moderate pain (4 - 6).      ascorbic acid (Vitamin C) 1,000 mg tablet Take 1 tablet (1,000 mg) by mouth once daily.      azaTHIOprine (Imuran) 50 mg tablet Take 2.5 tablets daily. 135 tablet 3    bumetanide (Bumex) 2 mg tablet Take 1 tablet (2 mg) by mouth once daily as needed (swelling).      enalapril (Vasotec) 5 mg tablet Take 1 tablet (5 mg) by mouth once daily at bedtime. 90 tablet 3    omega 3-dha-epa-fish oil (Fish OiL) 1,000 mg (120 mg-180 mg) capsule Take 1 capsule (1,000 mg) by mouth once daily.      pravastatin (Pravachol) 20 mg tablet Take 1 tablet (20 mg) by mouth once daily at bedtime. 90 tablet 3    rivaroxaban (Xarelto) 15 mg tablet Take 1 tablet (15 mg) by mouth once daily in the evening. Take with meals. Hold for 2 days and resume on 4/19/24 Do not start before April 19, 2024.      spironolactone (Aldactone) 25 mg tablet Take 1 tablet (25 mg) by mouth once daily. 90 tablet 3    tamsulosin (Flomax) 0.4 mg 24 hr capsule Take 1 capsule (0.4 mg) by mouth once daily at bedtime.      vitamin E  180 mg (400 unit) capsule Take 1 capsule (400 Units) by mouth once daily.       No current facility-administered medications for this visit.       Physical Exam:  Examination of the left knee: Range of motion is full and pain-free. No effusion. No patellar apprehension.  Mild tenderness palpation over the medial joint line no tenderness to palpation of the lateral joint line, extensor mechanism or patellar facets. Varus and valgus stress test are negative. Lachman's negative. Posterior drawer is negative. Sybil's and meniscal grind tests are negative.    Imaging:  Radiographs/images of the left knee taken in the office today were reviewed and revealed osteoarthritis in the medial lateral compartments, moderate osteoarthritis in the patellofemoral compartment.  The studies were reviewed with Dr. Roper personally in the office today.    Problem List Items Addressed This Visit    None  Visit Diagnoses         Codes    Primary osteoarthritis of left knee     M17.12    Relevant Orders    XR knee left 4+ views (Completed)    Knee pain, unspecified chronicity, unspecified laterality     M25.569          Patient Discussion/Summary  We reviewed the exam and x-ray findings and discussed the conservative and surgical treatment options. We agreed to to start with conservative measures with topical Voltaren gel.  He would like to defer physical therapy at this time and would not like a corticosteroid injection today.  We did discuss that if his pain worsens he can return for this at any time.  All questions were answered and he was agreeable to plan.  Follow-up as needed.    Morteza Selby DO  Primary Care Sports Medicine Fellow      ** Please excuse any errors in grammar or translation related to this dictation. Voice recognition software was utilized to prepare this document. **

## 2024-06-10 ENCOUNTER — TELEMEDICINE (OUTPATIENT)
Dept: PRIMARY CARE | Facility: CLINIC | Age: 89
End: 2024-06-10
Payer: MEDICARE

## 2024-06-10 DIAGNOSIS — J02.9 ACUTE PHARYNGITIS, UNSPECIFIED ETIOLOGY: Primary | ICD-10-CM

## 2024-06-10 PROCEDURE — 1159F MED LIST DOCD IN RCRD: CPT | Performed by: FAMILY MEDICINE

## 2024-06-10 PROCEDURE — 1160F RVW MEDS BY RX/DR IN RCRD: CPT | Performed by: FAMILY MEDICINE

## 2024-06-10 PROCEDURE — 99441 PR PHYS/QHP TELEPHONE EVALUATION 5-10 MIN: CPT | Performed by: FAMILY MEDICINE

## 2024-06-10 PROCEDURE — 1036F TOBACCO NON-USER: CPT | Performed by: FAMILY MEDICINE

## 2024-06-10 RX ORDER — AMOXICILLIN AND CLAVULANATE POTASSIUM 875; 125 MG/1; MG/1
1 TABLET, FILM COATED ORAL 2 TIMES DAILY
Qty: 14 TABLET | Refills: 0 | Status: SHIPPED | OUTPATIENT
Start: 2024-06-10 | End: 2024-06-17

## 2024-06-10 ASSESSMENT — ENCOUNTER SYMPTOMS
SORE THROAT: 1
FEVER: 0
HOARSE VOICE: 1

## 2024-06-10 NOTE — PROGRESS NOTES
Subjective   Patient ID: Hugo Weaver is a 89 y.o. male who presents for Sore Throat.    Virtual or Telephone Consent    A telephone visit (audio only) between the patient (at the originating site) and the provider (at the distant site) was utilized to provide this telehealth service.   Verbal consent was requested and obtained from Hugo Weaver on this date, 06/10/24 for a telehealth visit.     Sore Throat   This is a new problem. The current episode started today. Associated symptoms include a hoarse voice.      He developed a sore throat this morning.  Main symptoms are sore throat and voice hoarseness.  No fever, cough or nasal congestion or drainage.  No known sick contacts        Review of Systems   Constitutional:  Negative for fever.   HENT:  Positive for hoarse voice and sore throat.        Objective   There were no vitals taken for this visit.    Physical Exam    Assessment/Plan   Problem List Items Addressed This Visit    None  Visit Diagnoses       Acute pharyngitis, unspecified etiology    -  Primary    Relevant Medications    amoxicillin-pot clavulanate (Augmentin) 875-125 mg tablet        He presents today with sore throat and voice hoarseness which have been present since this morning.  He would prefer to start antibiotics now.  He is at higher risk of bacterial infection based on risk factors including age, so this would be reasonable.  Will start treatment with Augmentin twice daily x 7 days.  Recommend follow-up or call in the next 3 to 5 days if symptoms are not improving with antibiotics  Total time spent today was between 5 and 10 minutes

## 2024-06-18 ENCOUNTER — TELEPHONE (OUTPATIENT)
Dept: PRIMARY CARE | Facility: CLINIC | Age: 89
End: 2024-06-18
Payer: MEDICARE

## 2024-06-18 DIAGNOSIS — J01.90 ACUTE SINUSITIS, RECURRENCE NOT SPECIFIED, UNSPECIFIED LOCATION: Primary | ICD-10-CM

## 2024-06-18 RX ORDER — DOXYCYCLINE 100 MG/1
100 CAPSULE ORAL 2 TIMES DAILY
Qty: 14 CAPSULE | Refills: 0 | Status: SHIPPED | OUTPATIENT
Start: 2024-06-18 | End: 2024-06-25

## 2024-06-18 NOTE — TELEPHONE ENCOUNTER
Dr. Barton had did a virtual visit (phone call) for the patient on 06/10/24, and had prescribed the patient Augmentin.  Patient called today, states that his throat feels much better however, he now has sinus congestion and is coughing up phlegm.  I had put him on the schedule this Friday 06/21/24 (soonest available) for another virtual visit (phone call) due to the patient being unable to drive.  The patient was wondering if Dr. Barton may be able to call  him sooner, and/or prescribe him another antibiotic and for it to be sent to [facility] in [place]? Please advise.

## 2024-06-18 NOTE — TELEPHONE ENCOUNTER
I spoke with him.  His sore throat is better, however he has had nasal symptoms including thick nasal drainage and cough with postnasal drainage.  No headache, or fever.  He completed Augmentin 1 to 2 days ago.  I did send in doxycycline to cover for acute sinusitis, and recommended that he keep his follow-up on 6/21 so we can recheck his symptoms

## 2024-06-21 ENCOUNTER — TELEMEDICINE (OUTPATIENT)
Dept: PRIMARY CARE | Facility: CLINIC | Age: 89
End: 2024-06-21
Payer: MEDICARE

## 2024-06-21 DIAGNOSIS — J06.9 UPPER RESPIRATORY TRACT INFECTION, UNSPECIFIED TYPE: Primary | ICD-10-CM

## 2024-06-21 PROCEDURE — 1159F MED LIST DOCD IN RCRD: CPT | Performed by: FAMILY MEDICINE

## 2024-06-21 PROCEDURE — 99441 PR PHYS/QHP TELEPHONE EVALUATION 5-10 MIN: CPT | Performed by: FAMILY MEDICINE

## 2024-06-21 PROCEDURE — 1160F RVW MEDS BY RX/DR IN RCRD: CPT | Performed by: FAMILY MEDICINE

## 2024-06-21 PROCEDURE — 1036F TOBACCO NON-USER: CPT | Performed by: FAMILY MEDICINE

## 2024-06-21 ASSESSMENT — ENCOUNTER SYMPTOMS
COUGH: 1
SINUS COMPLAINT: 1

## 2024-06-21 NOTE — PROGRESS NOTES
Subjective   Patient ID: Hugo Weaver is a 89 y.o. male who presents for Sinus Problem.    Virtual or Telephone Consent    A telephone visit (audio only) between the patient (at the originating site) and the provider (at the distant site) was utilized to provide this telehealth service.   Verbal consent was requested and obtained from Hugo Weaver on this date, 06/21/24 for a telehealth visit.     Sinus Problem  This is a recurrent problem. The current episode started 1 to 4 weeks ago. Associated symptoms include congestion and coughing.        Here today for follow-up on sinus infection  His symptoms started approximately 12 days ago  At onset he had a sore throat.  He was seen at onset and treated with Augmentin  The sore throat got better, however he continued to have symptoms including postnasal drainage, nasal drainage and cough.  He was given a prescription for doxycycline 3 days ago  He states that his symptoms have been improving since starting the doxycycline.  He still has a productive cough and nasal drainage but states that it is getting better.  No nasal congestion or significant sinus pressure.  No fever, or shortness of breath or any other new symptoms  He also mentions that his weight has decreased from 61-1 54.  He has been taking Ensure daily.  No blood in stool or abdominal pain          Review of Systems   HENT:  Positive for congestion.    Respiratory:  Positive for cough.        Objective   There were no vitals taken for this visit.    Physical Exam not done due to today's visit being done over the telephone    Assessment/Plan   Problem List Items Addressed This Visit    None  Visit Diagnoses       Upper respiratory tract infection, unspecified type    -  Primary          He states that his symptoms have started to improve since starting doxycycline 3 days ago.  Recommended that he continue to take doxycycline and call us or follow-up in the office next week if symptoms do not resolve  once he has completed the antibiotic  Continue to take Ensure and continue to monitor weight.  If he continues to have any weight loss I recommended scheduling a follow-up to discuss  Total time spent today telephone was 5 to 10 minutes

## 2024-07-23 ENCOUNTER — APPOINTMENT (OUTPATIENT)
Dept: CARDIOLOGY | Facility: HOSPITAL | Age: 89
End: 2024-07-23
Payer: MEDICARE

## 2024-07-23 ENCOUNTER — APPOINTMENT (OUTPATIENT)
Dept: CARDIOLOGY | Facility: CLINIC | Age: 89
End: 2024-07-23
Payer: MEDICARE

## 2024-07-23 ENCOUNTER — TELEPHONE (OUTPATIENT)
Dept: PRIMARY CARE | Facility: CLINIC | Age: 89
End: 2024-07-23
Payer: MEDICARE

## 2024-07-23 DIAGNOSIS — I10 HYPERTENSION, UNSPECIFIED TYPE: ICD-10-CM

## 2024-07-23 DIAGNOSIS — R11.2 NAUSEA AND VOMITING, UNSPECIFIED VOMITING TYPE: Primary | ICD-10-CM

## 2024-07-23 RX ORDER — ENALAPRIL MALEATE 5 MG/1
5 TABLET ORAL NIGHTLY
Qty: 90 TABLET | Refills: 3 | Status: SHIPPED | OUTPATIENT
Start: 2024-07-23 | End: 2025-07-23

## 2024-07-23 RX ORDER — ONDANSETRON 4 MG/1
4 TABLET, ORALLY DISINTEGRATING ORAL EVERY 8 HOURS PRN
Qty: 15 TABLET | Refills: 0 | Status: SHIPPED | OUTPATIENT
Start: 2024-07-23

## 2024-07-23 NOTE — TELEPHONE ENCOUNTER
I spoke with him.  Earlier today he developed 2 episodes of vomiting.  He is feeling better now.  He is not having any nausea currently.  No abdominal pain currently.  Denies any chest pain.  He does have a lot of phlegm and is not sure if this may have triggered the vomiting.  He is requesting that I give him a medication to help with nausea/vomiting.  We will start Zofran as needed.  I did recommend that he let us know if he continues to have any recurrent symptoms.  If he does have any chest pain, abdominal pain, concerns for dehydration or any dizziness, or worsening symptoms, he should go to the ER immediately

## 2024-07-23 NOTE — TELEPHONE ENCOUNTER
Patient has vomited x2 this morning. He states he was supposed to go for his pacemaker follow up this am but he had to cancel this appointment.  He has no idea why he has vomited x2 today and is wondering if you can give him something for this or what he should do    [phone]

## 2024-07-29 ENCOUNTER — APPOINTMENT (OUTPATIENT)
Dept: OTOLARYNGOLOGY | Facility: CLINIC | Age: 89
End: 2024-07-29
Payer: MEDICARE

## 2024-07-29 ENCOUNTER — CLINICAL SUPPORT (OUTPATIENT)
Dept: AUDIOLOGY | Facility: CLINIC | Age: 89
End: 2024-07-29
Payer: MEDICARE

## 2024-07-29 DIAGNOSIS — H61.23 BILATERAL IMPACTED CERUMEN: ICD-10-CM

## 2024-07-29 DIAGNOSIS — H90.3 BILATERAL SENSORINEURAL HEARING LOSS: Primary | ICD-10-CM

## 2024-07-29 PROCEDURE — 1160F RVW MEDS BY RX/DR IN RCRD: CPT | Performed by: OTOLARYNGOLOGY

## 2024-07-29 PROCEDURE — 92557 COMPREHENSIVE HEARING TEST: CPT | Performed by: AUDIOLOGIST

## 2024-07-29 PROCEDURE — 92567 TYMPANOMETRY: CPT | Performed by: AUDIOLOGIST

## 2024-07-29 PROCEDURE — 99213 OFFICE O/P EST LOW 20 MIN: CPT | Performed by: OTOLARYNGOLOGY

## 2024-07-29 PROCEDURE — 1159F MED LIST DOCD IN RCRD: CPT | Performed by: OTOLARYNGOLOGY

## 2024-07-29 NOTE — PROGRESS NOTES
The patient returns.  We are seeing him back today for cerumen impaction as well as decline in hearing.  He does not yet have hearing aids but he is starting to believe he could benefit from them.  He denies any other worrisome ENT symptoms or signs.  Remaining head neck inquiry is clear.    Exam:  No acute distress.  Bilateral cerumen was removed under the microscope with used to curette, alligator forceps, and hydrogen peroxide with suction as necessary. Following the removal, the ear structures appear to be otherwise grossly normal.  Nasal exam is clear anteriorly. The septum is relatively straight and the nasal mucosa is grossly unremarkable without evidence of any worrisome infection or polyp or mass. The oral cavity and oropharynx are unremarkable. There is no evidence of any gross lesion or mucosal irregularity throughout the structures. The neck is negative for mass or lymphadenopathy. The trachea and parotid region are clear free of obvious mass or tumor. Facial structures are grossly otherwise unremarkable as well.    Audiogram: Bilateral sensorineural hearing loss starting at 40 dB sloping downward in the higher frequencies to 90 dB with good word discrimination 76 right 72 left    Assessment and plan:  Bilateral sensorineural hearing loss with bilateral cerumen impactions now clean.  Certainly could benefit from hearing aids and may pursue at his leisure.  He is in otherwise very good health for 89 and so I asked him to seriously give that some consideration and he will update me as needed.  All questions were answered in this regard accordingly.

## 2024-07-29 NOTE — PROGRESS NOTES
Mr. Weaver, age 89, was seen today for a hearing evaluation during his ENT visit with Dr. Bartlett and following bilateral cerumen impaction removal by Dr. Bartlett.  He has a history of bilateral hearing loss and is unsure if this has worsened since his last visit.  He did report subjective improvement in hearing following today's cleaning.    Results:  Otoscopy revealed clear ear canals and tympanic membranes were visualized bilaterally.  Tympanometry revealed normal, Type A tympanograms, indicating normal ear canal volume, peak pressure and compliance bilaterally.    Audiometric thresholds revealed a mild sloping to profound sensorineural hearing loss bilaterally.  Word recognition scores were fair bilaterally.  Hearing levels have mildly declined overall as compared to his last evaluation in 2021.    Recommendations:  Follow-up with PCP, Dr. Barton, as medically directed.  Follow-up with ENT, Dr. Bartlett, as medically directed.  Consider binaural amplification.  Retest hearing annually to monitor.

## 2024-07-31 ENCOUNTER — OFFICE VISIT (OUTPATIENT)
Dept: CARDIOLOGY | Facility: CLINIC | Age: 89
End: 2024-07-31
Payer: MEDICARE

## 2024-07-31 ENCOUNTER — HOSPITAL ENCOUNTER (OUTPATIENT)
Dept: CARDIOLOGY | Facility: HOSPITAL | Age: 89
Discharge: HOME | End: 2024-07-31
Payer: MEDICARE

## 2024-07-31 VITALS
DIASTOLIC BLOOD PRESSURE: 62 MMHG | WEIGHT: 152 LBS | HEIGHT: 73 IN | TEMPERATURE: 96.8 F | SYSTOLIC BLOOD PRESSURE: 104 MMHG | BODY MASS INDEX: 20.15 KG/M2 | OXYGEN SATURATION: 95 % | HEART RATE: 87 BPM

## 2024-07-31 DIAGNOSIS — R00.1 SYMPTOMATIC BRADYCARDIA: ICD-10-CM

## 2024-07-31 DIAGNOSIS — I49.3 PVC (PREMATURE VENTRICULAR CONTRACTION): Primary | ICD-10-CM

## 2024-07-31 PROCEDURE — 93010 ELECTROCARDIOGRAM REPORT: CPT | Performed by: INTERNAL MEDICINE

## 2024-07-31 PROCEDURE — 99214 OFFICE O/P EST MOD 30 MIN: CPT | Performed by: INTERNAL MEDICINE

## 2024-07-31 PROCEDURE — 3078F DIAST BP <80 MM HG: CPT | Performed by: INTERNAL MEDICINE

## 2024-07-31 PROCEDURE — 1159F MED LIST DOCD IN RCRD: CPT | Performed by: INTERNAL MEDICINE

## 2024-07-31 PROCEDURE — 3074F SYST BP LT 130 MM HG: CPT | Performed by: INTERNAL MEDICINE

## 2024-07-31 PROCEDURE — 93281 PM DEVICE PROGR EVAL MULTI: CPT

## 2024-07-31 PROCEDURE — G2211 COMPLEX E/M VISIT ADD ON: HCPCS | Performed by: INTERNAL MEDICINE

## 2024-07-31 PROCEDURE — 1036F TOBACCO NON-USER: CPT | Performed by: INTERNAL MEDICINE

## 2024-07-31 PROCEDURE — 93005 ELECTROCARDIOGRAM TRACING: CPT | Performed by: INTERNAL MEDICINE

## 2024-07-31 RX ORDER — METOPROLOL SUCCINATE 50 MG/1
50 TABLET, EXTENDED RELEASE ORAL DAILY
Qty: 30 TABLET | Refills: 11 | Status: SHIPPED | OUTPATIENT
Start: 2024-07-31 | End: 2025-07-31

## 2024-07-31 ASSESSMENT — PATIENT HEALTH QUESTIONNAIRE - PHQ9
2. FEELING DOWN, DEPRESSED OR HOPELESS: NOT AT ALL
SUM OF ALL RESPONSES TO PHQ9 QUESTIONS 1 AND 2: 0
1. LITTLE INTEREST OR PLEASURE IN DOING THINGS: NOT AT ALL

## 2024-08-08 ENCOUNTER — APPOINTMENT (OUTPATIENT)
Dept: CARDIOLOGY | Facility: CLINIC | Age: 89
End: 2024-08-08
Payer: MEDICARE

## 2024-08-08 VITALS
BODY MASS INDEX: 20.15 KG/M2 | SYSTOLIC BLOOD PRESSURE: 86 MMHG | HEIGHT: 73 IN | WEIGHT: 152 LBS | DIASTOLIC BLOOD PRESSURE: 66 MMHG

## 2024-08-08 DIAGNOSIS — I48.21 PERMANENT ATRIAL FIBRILLATION (MULTI): ICD-10-CM

## 2024-08-08 DIAGNOSIS — E78.5 DYSLIPIDEMIA: ICD-10-CM

## 2024-08-08 DIAGNOSIS — I44.7 LEFT BUNDLE-BRANCH BLOCK: ICD-10-CM

## 2024-08-08 DIAGNOSIS — I25.709 ATHEROSCLEROSIS OF CORONARY ARTERY BYPASS GRAFT OF NATIVE HEART WITH ANGINA PECTORIS (CMS-HCC): Primary | ICD-10-CM

## 2024-08-08 DIAGNOSIS — I10 BENIGN ESSENTIAL HYPERTENSION: ICD-10-CM

## 2024-08-08 DIAGNOSIS — I48.91 ATRIAL FIBRILLATION, UNSPECIFIED TYPE (MULTI): ICD-10-CM

## 2024-08-08 PROCEDURE — 3074F SYST BP LT 130 MM HG: CPT | Performed by: INTERNAL MEDICINE

## 2024-08-08 PROCEDURE — 1126F AMNT PAIN NOTED NONE PRSNT: CPT | Performed by: INTERNAL MEDICINE

## 2024-08-08 PROCEDURE — 1036F TOBACCO NON-USER: CPT | Performed by: INTERNAL MEDICINE

## 2024-08-08 PROCEDURE — 99213 OFFICE O/P EST LOW 20 MIN: CPT | Performed by: INTERNAL MEDICINE

## 2024-08-08 PROCEDURE — 3078F DIAST BP <80 MM HG: CPT | Performed by: INTERNAL MEDICINE

## 2024-08-08 PROCEDURE — 1159F MED LIST DOCD IN RCRD: CPT | Performed by: INTERNAL MEDICINE

## 2024-08-08 ASSESSMENT — PAIN SCALES - GENERAL: PAINLEVEL: 0-NO PAIN

## 2024-08-08 NOTE — PROGRESS NOTES
Chief Complaint:   No chief complaint on file.     History Of Present Illness:    Hugo Weaver is a 89 y.o. male with a history of LBBB, CAD s/p CABG (2003 LIMA to LAD, VG to OM, and VG to right PDA), peripheral neuropathy, atrial fibrillation, chronic systolic heart failure, abdominal aortic aneurysm s/p repair, diabetes and chronic kidney disease here for follow-up.    Overall is doing well.  Had lost about 10 pounds after leaving the rehab.  He was eating 3 regular meals a day at rehab but when he got back home he was not eating quite as much.  He has made some changes and is having some meals delivered.  His family has also been delivering some meals to him as well.    He was having lightheadedness with getting up out of a chair.  He spoke to Dr. Gibbons who recommended that he get up slower.  This has helped.    He denies any palpitations or chest pain.  He has had no leg swelling.    I reviewed the patient's note by Dr. Gibbons 7/31/2024.  I also reviewed the discharge summary from 4/17/2024 and from 1/23/2024.    BiV pacemaker implant 4/15/24: [employer] device placed by Dr. Gibbons    Echocardiogram 11/2/2023: Mildly reduced LV function.  Mildly enlarged RV with mildly reduced RV function.  Severe left atrial enlargement and mild to moderate right atrial enlargement.  Moderate AR, mild MR, and mild to moderate TR.  RVSP 26 mmHg.     Echocardiogram 3/1/2023: EF 40 to 45%. Abnormal septal motion consistent with LBBB. Mildly reduced right ventricular systolic function. Severe left atrial enlargement and mild right atrial enlargement. Moderate AR.     Lexiscan nuclear stress test 12/6/2021: No evidence of ischemia. Possible scar in the distal anterior and distal septal wall extending to the apex. EF 53%.     24-hour Holter February 2021: Atrial fibrillation 100% of the time. Average heart rate 57 bpm ( bpm). Pause of 3 seconds at 4 AM on day 2.     Echocardiogram 6/12/2015: EF 30 to 35%. Mild to moderate  "left atrial enlargement and right atrial enlargement. Mild AR and TR.     Catheterization 12/31/2007: Mid circumflex 95-99% and right PDA 80%.  VG to PDA occluded.  VG to OM patent. LIMA was atretic and nonfunctional with note of anterograde flow through the native LAD system.        Allergies:  Atorvastatin and Simvastatin    Outpatient Medications:  Current Outpatient Medications   Medication Instructions    acetaminophen (TYLENOL) 650 mg, oral, Every 4 hours PRN    ascorbic acid (Vitamin C) 1,000 mg tablet 1 tablet, oral, Daily    azaTHIOprine (Imuran) 50 mg tablet Take 2.5 tablets daily.    bumetanide (BUMEX) 2 mg, oral, Daily PRN    enalapril (VASOTEC) 5 mg, oral, Nightly    metoprolol succinate XL (TOPROL-XL) 50 mg, oral, Daily, Do not crush or chew.    omega 3-dha-epa-fish oil (Fish OiL) 1,000 mg (120 mg-180 mg) capsule 1 capsule, oral, Daily    ondansetron ODT (ZOFRAN-ODT) 4 mg, oral, Every 8 hours PRN    pravastatin (PRAVACHOL) 20 mg, oral, Nightly    rivaroxaban (XARELTO) 15 mg, oral, Daily with evening meal, Hold for 2 days and resume on 4/19/24    spironolactone (ALDACTONE) 25 mg, oral, Daily    tamsulosin (FLOMAX) 0.4 mg, oral, Nightly    vitamin E 400 Units, oral, Daily       Last Recorded Vitals:  Visit Vitals  BP 86/66 Comment: Vitals taken by patient   Ht 1.854 m (6' 1\")   Wt 68.9 kg (152 lb)   BMI 20.05 kg/m²   Smoking Status Former   BSA 1.88 m²        Physical Exam:   In general: alert and in no acute distress.   HEENT: Mucous membranes moist, carotid upstrokes normal with no bruits. JVP is normal. No cervical lymphadenopathy. Cranial nerves II-XII grossly intact.  Pulmonary: Clear to auscultation bilaterally.  Cardiovascular: S1,S2, regular. No appreciable murmurs, rubs or gallops.   Lower extremities: Warm. 2+ distal pulses. Trivial edema.  Skin: warm and dry. No obvious rashes visualized.  Psychiatric: Oriented to person, place and time. No obvious agitation.     Last Labs:  CBC -  Lab Results "   Component Value Date    WBC 5.9 05/09/2024    HGB 12.8 (L) 05/09/2024    HCT 40.7 (L) 05/09/2024    MCV 98 05/09/2024     05/09/2024       CMP -  Lab Results   Component Value Date    CALCIUM 9.4 05/09/2024    PHOS 3.2 03/08/2023    PROT 6.5 05/09/2024    ALBUMIN 4.1 05/09/2024    AST 14 05/09/2024    ALT 10 05/09/2024    ALKPHOS 39 05/09/2024    BILITOT 0.9 05/09/2024       LIPID PANEL -   Lab Results   Component Value Date    CHOL 160 12/06/2023    TRIG 126 12/06/2023    HDL 49.1 12/06/2023    CHHDL 3.3 12/06/2023    LDLF 82 01/04/2023    VLDL 25 12/06/2023    NHDL 111 12/06/2023       RENAL FUNCTION PANEL -   Lab Results   Component Value Date    GLUCOSE 98 05/09/2024     05/09/2024    K 4.5 05/09/2024     05/09/2024    CO2 21 05/09/2024    ANIONGAP 18 05/09/2024    BUN 39 (H) 05/09/2024    CREATININE 1.47 (H) 05/09/2024    GFRMALE 47 (A) 07/11/2023    CALCIUM 9.4 05/09/2024    PHOS 3.2 03/08/2023    ALBUMIN 4.1 05/09/2024        Lab Results   Component Value Date     (H) 01/22/2024    HGBA1C 6.5 (H) 12/06/2023           Assessment/Plan    1) CAD: History of CABG with LIMA to LAD, VG to OM and VG to PDA.  Catheterization 2007 demonstrating an atretic LIMA with antegrade flow through the native LAD.  VG to PDA was also occluded.  VG to OM was patent.     No symptoms of exertional chest pain or shortness of breath. Based on the ISCHEMIA Trial no need for routine stress testing.     2) atrial fibrillation: Continue Xarelto 15 mg daily.  There was a mixup with his Xarelto being canceled.  I have reordered this.     3) left bundle branch block: Now s/p CRT-P.  Says that he is slowly feeling better.  Was explained that this might take some time.  Continue to follow with Dr Gibbons.      4) chronic systolic heart failure: Now on beta-blocker s/p CRT-P and seems to be tolerating them.  Continue enalapril.  I have asked him to decrease spironolactone in half to see if his orthostasis improves.  Did lose about 10 pounds after leaving rehab which may have contributed to the spironolactone having more of an effect than it previously had.    Asked him to call us in a week to let us know how he is doing with this change     5) dyslipidemia: LDL 82 most recently in January.  Continue with pravastatin     6) follow-up: 6 or sooner if needed      Jef Jara MD

## 2024-08-16 ENCOUNTER — TELEPHONE (OUTPATIENT)
Dept: CARDIOLOGY | Facility: CLINIC | Age: 89
End: 2024-08-16
Payer: MEDICARE

## 2024-09-12 ENCOUNTER — APPOINTMENT (OUTPATIENT)
Dept: AUDIOLOGY | Facility: CLINIC | Age: 89
End: 2024-09-12
Payer: MEDICARE

## 2024-09-12 DIAGNOSIS — H90.3 BILATERAL SENSORINEURAL HEARING LOSS: Primary | ICD-10-CM

## 2024-09-12 NOTE — PROGRESS NOTES
Mr. Weaver, age 89, returned today with his son for a hearing aid consultation.  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  He reported difficulty hearing at times on the phone, on the TV and in multiple talker listening environments such as restaurants.  He returned today to discuss his options for amplification.    Procedure:  Hearing evaluation results were reviewed.  Hearing aid styles, benefits of amplification and binaural hearing, realistic expectations, differences across levels of technology, rechargeable hearing aids and direct wireless compatibly options were discussed at this appointment.  By the end of the appointment, Mr. Weaver decided to order a right Phonak Audeo P90-R hearing aid in silver gray.  He will be contacted when his order arrives to schedule his hearing aid fitting appointment.

## 2024-09-17 ENCOUNTER — TELEPHONE (OUTPATIENT)
Dept: PRIMARY CARE | Facility: CLINIC | Age: 89
End: 2024-09-17

## 2024-09-17 ENCOUNTER — TELEMEDICINE (OUTPATIENT)
Dept: PRIMARY CARE | Facility: CLINIC | Age: 89
End: 2024-09-17
Payer: MEDICARE

## 2024-09-17 DIAGNOSIS — J01.90 ACUTE SINUSITIS, RECURRENCE NOT SPECIFIED, UNSPECIFIED LOCATION: Primary | ICD-10-CM

## 2024-09-17 PROCEDURE — 99442 PR PHYS/QHP TELEPHONE EVALUATION 11-20 MIN: CPT | Performed by: FAMILY MEDICINE

## 2024-09-17 PROCEDURE — 1159F MED LIST DOCD IN RCRD: CPT | Performed by: FAMILY MEDICINE

## 2024-09-17 PROCEDURE — 1160F RVW MEDS BY RX/DR IN RCRD: CPT | Performed by: FAMILY MEDICINE

## 2024-09-17 PROCEDURE — 1036F TOBACCO NON-USER: CPT | Performed by: FAMILY MEDICINE

## 2024-09-17 RX ORDER — DOXYCYCLINE 100 MG/1
100 CAPSULE ORAL 2 TIMES DAILY
Qty: 14 CAPSULE | Refills: 0 | Status: SHIPPED | OUTPATIENT
Start: 2024-09-17 | End: 2024-09-24

## 2024-09-17 ASSESSMENT — ENCOUNTER SYMPTOMS
SORE THROAT: 1
VOMITING: 1
DIARRHEA: 1
ABDOMINAL PAIN: 0
COUGH: 1
FEVER: 0
NAUSEA: 1

## 2024-09-17 NOTE — PROGRESS NOTES
Subjective   Patient ID: Hugo Weaver is a 89 y.o. male who presents for URI.    Virtual or Telephone Consent      Virtual or Telephone Consent    A telephone visit (audio only) between the patient (at the originating site) and the provider (at the distant site) was utilized to provide this telehealth service.   Verbal consent was requested and obtained from Hugo Weaver on this date, 09/17/24 for a telehealth visit.     URI   This is a new problem. Episode onset: 9/15/2024. Associated symptoms include coughing, diarrhea, nausea, a sore throat and vomiting. Pertinent negatives include no abdominal pain. Associated symptoms comments: fatigue.        Here today to discuss URI symptoms.  Symptoms started 2 days ago.  He was out to lunch 1 day prior to symptom onset  He has had fatigue, vomiting, raspy voice, productive cough, nausea, sore throat, and sinus pressure with drainage.  Cough has been productive.  No abdominal pain.  No fever.  No headache or myalgias  He feels that his current symptoms are affecting his head and sinus area more than chest  He did have similar symptoms in June due to acute sinusitis.  The symptoms did not fully resolve with Augmentin, but did resolve with doxycycline            Review of Systems   Constitutional:  Negative for fever.   HENT:  Positive for sore throat.    Respiratory:  Positive for cough.    Gastrointestinal:  Positive for diarrhea, nausea and vomiting. Negative for abdominal pain.       Objective   There were no vitals taken for this visit.    Physical Exam not done due to today's visit being done over the telephone    Assessment/Plan   Assessment & Plan  Acute sinusitis, recurrence not specified, unspecified location    Orders:    doxycycline (Vibramycin) 100 mg capsule; Take 1 capsule (100 mg) by mouth 2 times a day for 7 days. Take with at least 8 ounces (large glass) of water, do not lie down for 30 minutes after    His symptoms have only been present for 2 days,  however he is at higher risk of bacterial infection due to his age, so we will start treatment with antibiotics.  Start doxycycline twice daily x 7 days.  I did offer to have him stop by the office for COVID testing, he declines and would prefer to start with the antibiotic first.  Recommended that he notify me in the next 5 to 7 days if symptoms not improving with antibiotic, and to contact us earlier if any new or worsening symptoms  Total time spent today was 11 to 12 minutes

## 2024-09-17 NOTE — TELEPHONE ENCOUNTER
Patient has a sore throat, raspy voice, vomiting and loose stools and extremely tired  He is wondering if you could call him    [phone]

## 2024-09-18 ENCOUNTER — APPOINTMENT (OUTPATIENT)
Dept: CARDIOLOGY | Facility: CLINIC | Age: 89
End: 2024-09-18
Payer: MEDICARE

## 2024-09-18 ENCOUNTER — TELEPHONE (OUTPATIENT)
Dept: PRIMARY CARE | Facility: CLINIC | Age: 89
End: 2024-09-18
Payer: MEDICARE

## 2024-09-18 DIAGNOSIS — U07.1 COVID-19 VIRUS INFECTION: Primary | ICD-10-CM

## 2024-09-18 DIAGNOSIS — J01.90 ACUTE SINUSITIS, RECURRENCE NOT SPECIFIED, UNSPECIFIED LOCATION: Primary | ICD-10-CM

## 2024-09-18 RX ORDER — AMOXICILLIN AND CLAVULANATE POTASSIUM 875; 125 MG/1; MG/1
1 TABLET, FILM COATED ORAL 2 TIMES DAILY
Qty: 14 TABLET | Refills: 0 | Status: SHIPPED | OUTPATIENT
Start: 2024-09-18 | End: 2024-09-25

## 2024-09-18 NOTE — TELEPHONE ENCOUNTER
I spoke with him.  He took a home COVID test which did come back positive.  He is currently on day 3-4 of symptoms.  He is feeling a little bit better compared to yesterday and his most bothersome symptom has been the congestion and sinus pressure.  Fortunately he has been vaccinated against COVID and did receive his most recent COVID-vaccine in January of this year  We discussed antiviral treatment in detail.  He is within the treatment window and does have several risk factors for more severe COVID infection.  I considered starting treatment with Paxlovid, however this is contraindicated with the Xarelto that he is taking.  We did discuss molnupiravir.  We discussed that this medication does not have the same interactions as Paxlovid, but is less effective.  He would like to proceed with treatment.  Treat with molnupiravir twice daily x 5 days.  I did discuss the importance of going to the ER immediately if any chest pain, shortness of breath, dizziness or high fever

## 2024-09-18 NOTE — TELEPHONE ENCOUNTER
Patient states that he has taken 2 of the antibiotics yesterday and 1 today that was prescribed by Dr. Barton and he is still vomiting and having diarrhea.  Please advise.    Phone # [phone]

## 2024-09-18 NOTE — TELEPHONE ENCOUNTER
I spoke with him over the phone.  We saw him yesterday for virtual visit.  At that time he was having a 2-day history of sinusitis symptoms including sore throat, sinus pressure with drainage, productive cough, vomiting and diarrhea  We started him on doxycycline  He has taken 2 doses of doxycycline so far.  He is still having vomiting and diarrhea.  No abdominal pain.  He has also continued to have symptoms including nasal congestion, sore throat, postnasal drainage and a productive cough  No chest pain.  No leg edema.  He states that he had felt short of breath earlier today but does not feel short of breath currently  We discussed that it can sometimes take 2 to 3 days for an antibiotic to start working.  Discussed continuing doxycycline for an additional 1 to 2 days versus switching and he would prefer to switch antibiotics.  We will stop doxycycline and start Augmentin twice daily for a total of 7 days  He has been taking Zofran for vomiting I did recommend that he continue this.  He is denying any abdominal pain  I offered to have him stop by the office for a COVID test.  He states that he is going to be performing a home test.  Recommended that he contact me right away if the test is positive  He does not feel that any of his symptoms are severe enough for the ED currently.  I emphasized that if he develops any further episodes of shortness of breath, he needs to go to the ER immediately  He will let us know in the next 3 to 5 days if not improving

## 2024-09-25 ENCOUNTER — APPOINTMENT (OUTPATIENT)
Dept: CARDIOLOGY | Facility: CLINIC | Age: 89
End: 2024-09-25
Payer: MEDICARE

## 2024-09-26 ENCOUNTER — TELEPHONE (OUTPATIENT)
Dept: PRIMARY CARE | Facility: CLINIC | Age: 89
End: 2024-09-26
Payer: MEDICARE

## 2024-09-26 NOTE — TELEPHONE ENCOUNTER
Impression: Exdtve age-rel mclr degn, right eye, with actv chrdl neovas: [id number]. Right.
s/p Avastin injection x 2/17/21 OCT:
OD: atrophy, drusen OS: drusen Plan: The diagnosis, natural history, and prognosis of wet AMD, as well as the risks and benefits of various treatment options including laser, PDT, Avastin, Lucentis and Eylea; along with the alternatives of observation or participation in a clinical trial, were discussed at length. The patient understands that treatment may not improve vision, but should reduce the risk of further visual loss. The patient understands that smoking is the most significant modifiable risk factor for the development of advanced AMD, and also understands that if they do smoke (or have history of smoking in the past 5 years), they cannot take vitamin A/beta-carotene, since it may increase their risk for lung cancer. Given stability of vision and exam, pt elects to proceed with observation with PRN tx. 

RTC 1 month DFE OU OCT OU Re-eval Avastin Patient states his took his last dose paxlovid on Monday. He states he is still coughing and congestion.  He has an antibiotic that Dr Barton prescribed. He would like to know if he should start taking that or if there is anything else he should do    Please Advise    [phone]

## 2024-10-01 ENCOUNTER — OFFICE VISIT (OUTPATIENT)
Dept: CARDIOLOGY | Facility: CLINIC | Age: 89
End: 2024-10-01
Payer: MEDICARE

## 2024-10-01 VITALS
SYSTOLIC BLOOD PRESSURE: 101 MMHG | RESPIRATION RATE: 16 BRPM | HEIGHT: 73 IN | BODY MASS INDEX: 20.67 KG/M2 | HEART RATE: 109 BPM | TEMPERATURE: 96.4 F | WEIGHT: 156 LBS | OXYGEN SATURATION: 99 % | DIASTOLIC BLOOD PRESSURE: 63 MMHG

## 2024-10-01 DIAGNOSIS — I48.21 PERMANENT ATRIAL FIBRILLATION (MULTI): Primary | ICD-10-CM

## 2024-10-01 PROCEDURE — 1159F MED LIST DOCD IN RCRD: CPT | Performed by: INTERNAL MEDICINE

## 2024-10-01 PROCEDURE — 3078F DIAST BP <80 MM HG: CPT | Performed by: INTERNAL MEDICINE

## 2024-10-01 PROCEDURE — 3074F SYST BP LT 130 MM HG: CPT | Performed by: INTERNAL MEDICINE

## 2024-10-01 PROCEDURE — G2211 COMPLEX E/M VISIT ADD ON: HCPCS | Performed by: INTERNAL MEDICINE

## 2024-10-01 PROCEDURE — 93005 ELECTROCARDIOGRAM TRACING: CPT | Performed by: INTERNAL MEDICINE

## 2024-10-01 PROCEDURE — 93010 ELECTROCARDIOGRAM REPORT: CPT | Performed by: INTERNAL MEDICINE

## 2024-10-01 PROCEDURE — 1036F TOBACCO NON-USER: CPT | Performed by: INTERNAL MEDICINE

## 2024-10-01 PROCEDURE — 99214 OFFICE O/P EST MOD 30 MIN: CPT | Performed by: INTERNAL MEDICINE

## 2024-10-01 NOTE — PROGRESS NOTES
Referring Provider: Jef Jara MD  Reason for Consult: Bradycardia    History of Present Illness:      Hugo Weaver is a 89 y.o. year old male patient with a history significant for hypertension, CAD s/p CABG in 2003, permanent atrial fibrillation, ischemic cardiomyopathy mildly reduced ejection fraction 45-50%, AAA s/p repair, diabetes, and CKD  who was referred by Dr. Jara for consideration of pacemaker for symptomatic bradycardia. He is here for follow-up today.    He agreed to move forward with CRT-P implantation.  We successfully implanted a CRT-P on 4/16/2024.  The RV lead was placed in the left bundle area, and the LV lead was placed out a basal lateral coronary sinus branch.  He has done well since pacemaker implantation.  At his last visit, we noted some frequent PVCs and we added on metoprolol.  We also added more aggressive rate response as he was having some dizziness with standing.    He has done well with these changes.  His PVC burden is much reduced on metoprolol, and he has more effective biventricular pacing.  He is not getting up more slowly and he has less dizziness with standing.      Focused Cardiovascular Problem List:  Permanent Atrial Fibrillation: [id number] = 6. Anticoagulated on Xarelto with lower dose of 15mg due to kidney function. Rate-controlled/bradycardic off any AV adrian agents.  Status post dual-chamber Medtronic pacemaker  Status post Medtronic CRT-P for tachybradycardia syndrome  CAD s/p CABG  Ischemic cardiomyopathy with  mildly reduced ejection fraction (45-50% most recent, previously as low as 35%): Unable to tolerate beta-blockers. On ACE-I and MRA.   Type 2 DM: Currently diet controlled  Hypertension  CKD  PVCs: On metoprolol      Past Medical and Surgical History:  Mr. Weaver  has a past medical history of Atrial fibrillation (Multi), Cataract, Chronic kidney disease, Coronary artery disease, Diabetes mellitus (Multi), Dyspnea, unspecified, Heart disease, HL  (hearing loss), Hyperlipidemia, Hypertension, Ischemic cardiomyopathy, Other specified postprocedural states, Personal history of other diseases of the circulatory system, Personal history of other diseases of the circulatory system, Personal history of other diseases of the circulatory system, Personal history of other diseases of the digestive system (2018), Personal history of other endocrine, nutritional and metabolic disease, Personal history of other endocrine, nutritional and metabolic disease, Personal history of other endocrine, nutritional and metabolic disease, Personal history of other medical treatment, Personal history of other specified conditions (2015), and Valvular heart disease.    has a past surgical history that includes Other surgical history (2014); Other surgical history (2018); Other surgical history (2019); Other surgical history (2019); CT angio abdomen pelvis w and or wo IV IV contrast (2020); Coronary artery bypass graft (10/2005); Cholecystectomy (10/2018); and Cardiac electrophysiology procedure (N/A, 2024).    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  Other: Wife passed away from Parkinson's in     Relevant Family History:   Family History   Problem Relation Name Age of Onset    Diabetes Mother Clemencia     Other (Cardiac disorder) Mother Clemencia     Heart disease Mother Clemencia     Hypertension Mother Clemencia     Arthritis Mother Clemencia     Diabetes type II Mother Clemencia     No Known Problems Father       Family History of Sudden Death: YesFather  of AAA rupture       Allergies:  Allergies   Allergen Reactions    Atorvastatin Unknown    Simvastatin Unknown     "    Medications:  Current Outpatient Medications   Medication Instructions    acetaminophen (TYLENOL) 650 mg, oral, Every 4 hours PRN    ascorbic acid (Vitamin C) 1,000 mg tablet 1 tablet, oral, Daily    azaTHIOprine (Imuran) 50 mg tablet Take 2.5 tablets daily.    bumetanide (BUMEX) 2 mg, oral, Daily PRN    enalapril (VASOTEC) 5 mg, oral, Nightly    metoprolol succinate XL (TOPROL-XL) 50 mg, oral, Daily, Do not crush or chew.    omega 3-dha-epa-fish oil (Fish OiL) 1,000 mg (120 mg-180 mg) capsule 1 capsule, oral, Daily    ondansetron ODT (ZOFRAN-ODT) 4 mg, oral, Every 8 hours PRN    pravastatin (PRAVACHOL) 20 mg, oral, Nightly    rivaroxaban (XARELTO) 15 mg, oral, Daily with evening meal    spironolactone (ALDACTONE) 25 mg, oral, Daily    tamsulosin (FLOMAX) 0.4 mg, oral, Nightly    vitamin E 400 Units, oral, Daily         Objective   Physical Exam:  Last Recorded Vitals:      4/26/2024     3:43 PM 5/1/2024    10:30 AM 5/8/2024     2:01 PM 5/15/2024    12:13 PM 7/31/2024    10:12 AM 8/8/2024     9:38 AM 10/1/2024    10:38 AM   Vitals   Systolic 118 134 122 142 104 86 101   Diastolic 66 70 60 78 62 66 63   Heart Rate 68 79 74 87 87  109   Temp 36.4 °C (97.6 °F) 36.3 °C (97.3 °F) 36.8 °C (98.2 °F) 36.4 °C (97.5 °F) 36 °C (96.8 °F)  35.8 °C (96.4 °F)   Resp 20 20 20    16   Height (in)     1.854 m (6' 1\") 1.854 m (6' 1\") 1.854 m (6' 1\")   Weight (lb)     152 152 156   BMI     20.05 kg/m2 20.05 kg/m2 20.58 kg/m2   BSA (m2)     1.88 m2 1.88 m2 1.91 m2   Visit Report     Report  Report    Visit Vitals  /63   Pulse 109   Temp 35.8 °C (96.4 °F)   Resp 16   Ht 1.854 m (6' 1\")   Wt 70.8 kg (156 lb)   SpO2 99%   BMI 20.58 kg/m²   Smoking Status Former   BSA 1.91 m²      Gen: NAD, sitting comfortably  HEENT: NC/AT  Chest: Device in situ, no overlying erythema, tenderness, or irritation  Card: RRR, 2/6 HSM, 1/4 diastolic murmur  Pulm: Clear to auscultation bilaterally  Ext: No LE edema  Neuro: No focal " deficits    Diagnostic Results      My Interpretation of Reviewed Study(s):  Prior ECGs (reviewed and my interpretation):  10/1/2024: Underlying atrial fibrillation, biventricularly paced rhythm, single PVC  7/31/2024(After programming changes with LV preexcitation by -40 ms): Ventricular paced rhythm, initial Q wave in lead I, initial R wave in lead V1 consistent with left ventricular activation, premature ventricular contractions, heart rate 94 bpm  7/31/2024 (prior to programming changes): Ventricular paced rhythm, frequent PVCs in a pattern of bigeminy, heart rate 70 bpm  1/18/2024: Atrial fibrillation, HR 70bpm, frequent PVCs  11/3/2023: Atrial Fibrillation, HR 65bpm, LBBB QRSd 162ms    Echocardiography:  11/3/2023  Transthoracic Echo (TTE) Complete    Result Date: 11/3/2023            [facility] [place] [id number] [facility] [id number]    Tel [phone] Fax [phone] TRANSTHORACIC ECHOCARDIOGRAM REPORT  Patient Name:      VIPUL CHRISTIAN    Reading Physician:   [id number] Jarvis Freeman MD Study Date:        11/2/2023          Ordering Provider:   [id number] ESTER CARLSON MRN/PID:           [id number]           Fellow: Accession#:        [id number]       Nurse: Date of Birth/Age: 9/7/1931 / 89      Sonographer:         Bettie Hopkins DCS                    years Gender:            M                  Additional Staff: Height:            185.42 cm          Admit Date: Weight:            79.38 kg           Admission Status:    Inpatient - Routine BSA:               2.03 m2            Department Location: [facility] Echo Lab Blood Pressure: 132 /60 mmHg Study Type:    TRANSTHORACIC ECHO (TTE) COMPLETE Diagnosis/ICD: Other chest pain-R07.89 Indication:    chest pain  Study Detail: The following Echo studies were performed: 2D, M-Mode, Doppler and               color flow.  PHYSICIAN INTERPRETATION: Left Ventricle: Left ventricular systolic function is  mildly decreased. There is global hypokinesis of the left ventricle with minor regional variations. The left ventricular cavity size is mildly dilated. Spectral Doppler shows an impaired relaxation pattern of left ventricular diastolic filling. Left Atrium: The left atrium is severely dilated. Right Ventricle: The right ventricle is mildly enlarged. There is reduced right ventricular systolic function. Right Atrium: The right atrium is mild to moderately dilated. Aortic Valve: The aortic valve appears structurally normal. There is moderate aortic valve regurgitation. The peak instantaneous gradient of the aortic valve is 10.0 mmHg. The mean gradient of the aortic valve is 6.0 mmHg. Mitral Valve: The mitral valve is normal in structure. There is mild mitral valve regurgitation. Tricuspid Valve: The tricuspid valve is structurally normal. There is mild to moderate tricuspid regurgitation. Pulmonic Valve: The pulmonic valve was not assessed. The pulmonic valve regurgitation was not assessed. Pericardium: There is no pericardial effusion noted. Aorta: The aortic root is abnormal. There is mild dilatation of the aortic root.  CONCLUSIONS:  1. Left ventricular systolic function is mildly decreased.  2. Spectral Doppler shows an impaired relaxation pattern of left ventricular diastolic filling.  3. There is reduced right ventricular systolic function.  4. The left atrium is severely dilated.  5. The right atrium is mild to moderately dilated.  6. Mild to moderate tricuspid regurgitation.  7. Moderate aortic valve regurgitation.  8. Dilated left ventricle, EF 45-50% with global hypokinesis.  9. Moderate aortic valve regurgitation no significant stenosis. 10. Severely dilated left atrium with mild mitral regurgitation. 11. Right ventricle function is mildly reduced. 12. Patient wire noted in the right heart. 13. There is global hypokinesis of the left ventricle with minor regional variations. QUANTITATIVE DATA SUMMARY: 2D  MEASUREMENTS:                           Normal Ranges: LAs:           4.80 cm    (2.7-4.0cm) IVSd:          1.13 cm    (0.6-1.1cm) LVPWd:         1.22 cm    (0.6-1.1cm) LVIDd:         5.99 cm    (3.9-5.9cm) LVIDs:         4.19 cm LV Mass Index: 149.7 g/m2 LV % FS        30.1 % LA VOLUME:                             Normal Ranges: LA Area A4C:     18.6 cm2 LA Area A2C:     26.9 cm2 LA Volume Index: 37.0 ml/m2 LA Vol A4C:      52.0 ml LA Vol A2C:      97.0 ml LA Vol BP:       75.0 ml M-MODE MEASUREMENTS:                  Normal Ranges: Ao Root: 4.20 cm (2.0-3.7cm) AoV Exc: 2.60 cm (1.5-2.5cm) AORTA MEASUREMENTS:                    Normal Ranges: AoV Exc:   2.60 cm (1.5-2.5cm) Asc Ao, d: 4.20 cm (2.1-3.4cm) LV SYSTOLIC FUNCTION BY 2D PLANIMETRY (MOD):                     Normal Ranges: EF-A4C View: 54.2 % (>=55%) EF-A2C View: 42.2 % EF-Biplane:  49.0 % LV DIASTOLIC FUNCTION:                        Normal Ranges: MV Peak E:    0.58 m/s (0.7-1.2 m/s) MV Peak A:    0.40 m/s (0.42-0.7 m/s) E/A Ratio:    1.46     (1.0-2.2) MV e'         0.09 m/s (>8.0) MV lateral e' 0.11 m/s MV medial e'  0.08 m/s E/e' Ratio:   6.67     (<8.0) MITRAL VALVE:                 Normal Ranges: MV DT: 311 msec (150-240msec) MITRAL INSUFFICIENCY:                      Normal Ranges: MR Vmax: 442.00 cm/s AORTIC VALVE:                                    Normal Ranges: AoV Vmax:                1.58 m/s  (<=1.7m/s) AoV Peak PG:             10.0 mmHg (<20mmHg) AoV Mean P.0 mmHg  (1.7-11.5mmHg) LVOT Max Dre:            0.80 m/s  (<=1.1m/s) AoV VTI:                 44.30 cm  (18-25cm) LVOT VTI:                22.40 cm LVOT Diameter:           2.30 cm   (1.8-2.4cm) AoV Area, VTI:           2.10 cm2  (2.5-5.5cm2) AoV Area,Vmax:           2.12 cm2  (2.5-4.5cm2) AoV Dimensionless Index: 0.51 AORTIC INSUFFICIENCY: AI Vmax:       3.06 m/s AI Half-time:  1078 msec AI Decel Rate: 88.33 cm/s2  RIGHT VENTRICLE: RV Basal 3.10 cm RV Mid   1.80 cm RV  Major 7.5 cm TAPSE:   17.0 mm TRICUSPID VALVE/RVSP:                             Normal Ranges: Peak TR Velocity: 2.38 m/s RV Syst Pressure: 25.7 mmHg (< 30mmHg)  [id number] Jarvis Freeman MD Electronically signed on 11/3/2023 at 11:40:47 AM  ** Final **       Stress Test:   12/3/2021  IMPRESSION:  1. No definite evidence of ischemia.  2. Suspicion of an infarct along the distal anterior/distal septal  wall extending into the apex.  3. Mild left ventricular dilatation is noted.  4. Left ventricular ejection fraction estimated at 53%.    Relevant Labs:  Lab Results   Component Value Date    CREATININE 1.47 (H) 05/09/2024    CREATININE 1.39 (H) 04/23/2024    CREATININE 1.26 04/18/2024    K 4.5 05/09/2024    K 4.5 04/23/2024    K 3.8 04/18/2024    HGBA1C 6.5 (H) 12/06/2023    HGBA1C 7.0 (A) 05/10/2023    HGBA1C 7.4 (A) 01/04/2023    HGB 12.8 (L) 05/09/2024    HGB 12.8 (L) 04/23/2024    HGB 13.2 (L) 04/18/2024    INR 1.2 (H) 04/16/2024    INR 1.9 (H) 04/11/2024    INR 2.5 (H) 12/27/2022    AST 14 05/09/2024    AST 11 01/23/2024    AST 13 01/22/2024    ALT 10 05/09/2024    ALT 8 (L) 01/23/2024    ALT 9 (L) 01/22/2024   Device interrogation: His device was interrogated today by me.  No parameter changes were made.  His leads were stable and continuing at their normal trends.  He has over 10 years of battery life left.  He is getting greater than 90% effective CRT.      Assessment/Plan   Assessment and Plan:  Hugo Weaver is a 89 y.o. year old male patient who was referred for management and evaluation of symptomatic bradycardia. He is doing well after CRT-P implantation (no atrial lead implanted due to permanent atrial fibrillation).  I personally interrogated his device today and tested all the leads.  At our last visit, I added on metoprolol to suppress PVCs, and this has worked nicely.  He is now receiving greater than 90% CRT pacing.  He otherwise feels very well.    He can continue following at a yearly basis now for his  pacemaker, and continuing with his routine pacemaker checks at the pacemaker clinic.    Return to Clinic:  In 1 year    Thank you very much for allowing me to participate in the care of this patient. Please do not hesitate to contact me with any further questions or concerns.    Andrez Gibbons MD  Clinical Cardiac Electrophysiologist  Director of Atrial Fibrillation Ablation, [facility]  Director of Ventricular Arrhythmias Research, [facility]  Office Phone Number: [phone]

## 2024-10-03 ENCOUNTER — TELEPHONE (OUTPATIENT)
Dept: PRIMARY CARE | Facility: CLINIC | Age: 89
End: 2024-10-03

## 2024-10-03 ENCOUNTER — APPOINTMENT (OUTPATIENT)
Dept: AUDIOLOGY | Facility: CLINIC | Age: 89
End: 2024-10-03
Payer: MEDICARE

## 2024-10-03 DIAGNOSIS — H90.3 BILATERAL SENSORINEURAL HEARING LOSS: Primary | ICD-10-CM

## 2024-10-03 NOTE — TELEPHONE ENCOUNTER
Patient states he took his last dose of antibiotic yesterday and he is feeling better.  He thinks he has turned the corner.  He would like to thank you for all of your help

## 2024-10-03 NOTE — PROGRESS NOTES
Mr. Weaver returned today with his son for the fitting of his right Phonak Audeo P90-R hearing aid in silver gray coupled to size 3 M  coupled to small open dome.    RIGHT Serial #[id number]  Service repair & loss/damage warranty expiration: 10/13/2027    Procedure:  Feedback manager was completed and no feedback was noted.  Hearing aid was turned on 90% target gain and he reported an echo quality in his own voice.  Target gain was reduced to 85% and occlusion manager was turned on weak.  He continued to report an echo in his voice but stated it had improved.    Charging information was discussed. Powering on/off the hearing aid was demonstrated. Low battery warning was demonstrated and tune up leonor was turned off.  Some cleaning/maintenance tips were reviewed.  Warranty information was reviewed.     Mr. Weaver demonstrated understanding of all topics discussed at today's fitting appointment.  Payment was accepted today in full.  He is scheduled to return for a hearing aid check, October 17 at 2:30 pm.

## 2024-10-17 ENCOUNTER — APPOINTMENT (OUTPATIENT)
Dept: AUDIOLOGY | Facility: CLINIC | Age: 89
End: 2024-10-17
Payer: MEDICARE

## 2024-10-17 DIAGNOSIS — H90.3 BILATERAL SENSORINEURAL HEARING LOSS: Primary | ICD-10-CM

## 2024-10-17 NOTE — PROGRESS NOTES
Mr. Weaver returned today with his son for the two week check on his right Phonak Audeo P90-R hearing aid.  He reported overall adjusting well and has noticed that he isn't asking as often for repetition and that his TV volume is lower.  He is also getting more comfortable with inserting it in his ear appropriately.  He did state he expected he would be hearing a bit better and wonders if it can be adjusted.    Procedure:  Target gain was increased from 85% to 95% and he reported improved sound with good clarity.  He is scheduled to return to the office in January for his next ear cleaning with Dr. Bartlett and will otherwise contact the office to return should concern arise.

## 2024-10-18 ENCOUNTER — TELEMEDICINE (OUTPATIENT)
Dept: PRIMARY CARE | Facility: CLINIC | Age: 89
End: 2024-10-18
Payer: MEDICARE

## 2024-10-18 DIAGNOSIS — L03.119 CELLULITIS OF LOWER EXTREMITY, UNSPECIFIED LATERALITY: Primary | ICD-10-CM

## 2024-10-18 PROCEDURE — 1159F MED LIST DOCD IN RCRD: CPT | Performed by: FAMILY MEDICINE

## 2024-10-18 PROCEDURE — 1160F RVW MEDS BY RX/DR IN RCRD: CPT | Performed by: FAMILY MEDICINE

## 2024-10-18 PROCEDURE — 1036F TOBACCO NON-USER: CPT | Performed by: FAMILY MEDICINE

## 2024-10-18 PROCEDURE — 99442 PR PHYS/QHP TELEPHONE EVALUATION 11-20 MIN: CPT | Performed by: FAMILY MEDICINE

## 2024-10-18 RX ORDER — AMOXICILLIN AND CLAVULANATE POTASSIUM 875; 125 MG/1; MG/1
1 TABLET, FILM COATED ORAL 2 TIMES DAILY
Qty: 20 TABLET | Refills: 0 | Status: SHIPPED | OUTPATIENT
Start: 2024-10-18 | End: 2024-10-28

## 2024-10-18 ASSESSMENT — ENCOUNTER SYMPTOMS
SHORTNESS OF BREATH: 0
FEVER: 0

## 2024-10-18 NOTE — PROGRESS NOTES
Subjective   Patient ID: Hugo Weaver is a 89 y.o. male who presents for No chief complaint on file..    Rash  This is a new problem. Pertinent negatives include no fever or shortness of breath.        Virtual or Telephone Consent    A telephone visit (audio only) between the patient (at the originating site) and the provider (at the distant site) was utilized to provide this telehealth service.   Verbal consent was requested and obtained from Hugo Weaver on this date, 10/18/24 for a telehealth visit.     He is concerned about left leg cellulitis  He developed redness involving his left leg approximately 5 days ago.  Yesterday the leg started to swell.  Redness is located in his left lower leg just above the ankle area.  He states that the area of redness is approximately 6 inches in diameter  Otherwise denies any fever.  No chest pain or shortness of breath    He was previously hospitalized in January of this year with cellulitis involving his left leg.  During that stay he had a left leg ultrasound negative for DVT.  He was treated with Unasyn and discharged with a full 14-day course of Augmentin    He states that his current leg symptoms are similar to symptoms he has had with cellulitis in the past    Review of Systems   Constitutional:  Negative for fever.   Respiratory:  Negative for shortness of breath.    Cardiovascular:  Positive for leg swelling. Negative for chest pain.   Skin:  Positive for rash.       Objective   There were no vitals taken for this visit.    Physical Exam not done due to visit being done over the phone    Assessment/Plan   Assessment & Plan  Cellulitis of lower extremity, unspecified laterality         He presents today with a 5-day history of redness and swelling involving his left lower leg.  He has a history of cellulitis involving his left leg in the past with similar symptoms  He is already taking Xarelto, so I do not feel that he needs a leg ultrasound at this time  We will  start treatment with Augmentin, which has been effective for this in the past.  Recommended monitoring very closely.  If any worsening redness, increased swelling, fever, or any chest pain or shortness of breath, recommended going to the ED immediately.  Recommended follow-up or calling in the next 5 to 7 days if this does not resolve  Total time spent today was 14 minutes

## 2024-10-24 ENCOUNTER — TELEPHONE (OUTPATIENT)
Dept: CARDIOLOGY | Facility: CLINIC | Age: 89
End: 2024-10-24
Payer: MEDICARE

## 2024-10-24 DIAGNOSIS — I42.0 DILATED CARDIOMYOPATHY (MULTI): Primary | ICD-10-CM

## 2024-10-24 NOTE — TELEPHONE ENCOUNTER
Pt requesting refill for bumetanide (Bumex) 2 mg tablet and also potassium to [facility]

## 2024-10-25 RX ORDER — BUMETANIDE 2 MG/1
2 TABLET ORAL DAILY PRN
Qty: 30 TABLET | Refills: 11 | Status: SHIPPED | OUTPATIENT
Start: 2024-10-25 | End: 2025-10-25

## 2024-10-28 NOTE — TELEPHONE ENCOUNTER
Notified patient via  that he does not need to be on potassium per Dr. Jara. Office number left on vm.

## 2024-10-31 ENCOUNTER — TELEPHONE (OUTPATIENT)
Dept: PRIMARY CARE | Facility: CLINIC | Age: 89
End: 2024-10-31
Payer: MEDICARE

## 2024-10-31 ENCOUNTER — HOSPITAL ENCOUNTER (OUTPATIENT)
Dept: CARDIOLOGY | Facility: HOSPITAL | Age: 89
Discharge: HOME | End: 2024-10-31
Payer: MEDICARE

## 2024-10-31 DIAGNOSIS — I42.0 PRIMARY IDIOPATHIC DILATED CARDIOMYOPATHY (MULTI): ICD-10-CM

## 2024-10-31 DIAGNOSIS — I48.0 PAROXYSMAL ATRIAL FIBRILLATION (MULTI): ICD-10-CM

## 2024-10-31 DIAGNOSIS — Z95.0 CARDIAC PACEMAKER IN SITU: ICD-10-CM

## 2024-10-31 PROCEDURE — 93296 REM INTERROG EVL PM/IDS: CPT

## 2024-11-04 ENCOUNTER — APPOINTMENT (OUTPATIENT)
Dept: PRIMARY CARE | Facility: CLINIC | Age: 89
End: 2024-11-04
Payer: MEDICARE

## 2024-11-04 VITALS
HEIGHT: 72 IN | WEIGHT: 158 LBS | BODY MASS INDEX: 21.4 KG/M2 | OXYGEN SATURATION: 98 % | DIASTOLIC BLOOD PRESSURE: 68 MMHG | RESPIRATION RATE: 20 BRPM | SYSTOLIC BLOOD PRESSURE: 114 MMHG | HEART RATE: 86 BPM | TEMPERATURE: 97.3 F

## 2024-11-04 DIAGNOSIS — Z00.01 ENCOUNTER FOR WELL ADULT EXAM WITH ABNORMAL FINDINGS: Primary | ICD-10-CM

## 2024-11-04 DIAGNOSIS — E55.9 VITAMIN D DEFICIENCY: ICD-10-CM

## 2024-11-04 DIAGNOSIS — L03.119 CELLULITIS OF LOWER EXTREMITY, UNSPECIFIED LATERALITY: ICD-10-CM

## 2024-11-04 DIAGNOSIS — E11.22 TYPE 2 DIABETES MELLITUS WITH CHRONIC KIDNEY DISEASE, WITHOUT LONG-TERM CURRENT USE OF INSULIN, UNSPECIFIED CKD STAGE (MULTI): ICD-10-CM

## 2024-11-04 PROCEDURE — G0439 PPPS, SUBSEQ VISIT: HCPCS | Performed by: FAMILY MEDICINE

## 2024-11-04 PROCEDURE — 1123F ACP DISCUSS/DSCN MKR DOCD: CPT | Performed by: FAMILY MEDICINE

## 2024-11-04 PROCEDURE — G0008 ADMIN INFLUENZA VIRUS VAC: HCPCS | Performed by: FAMILY MEDICINE

## 2024-11-04 PROCEDURE — 99213 OFFICE O/P EST LOW 20 MIN: CPT | Performed by: FAMILY MEDICINE

## 2024-11-04 PROCEDURE — 90662 IIV NO PRSV INCREASED AG IM: CPT | Performed by: FAMILY MEDICINE

## 2024-11-04 PROCEDURE — 1170F FXNL STATUS ASSESSED: CPT | Performed by: FAMILY MEDICINE

## 2024-11-04 PROCEDURE — 1036F TOBACCO NON-USER: CPT | Performed by: FAMILY MEDICINE

## 2024-11-04 PROCEDURE — 1159F MED LIST DOCD IN RCRD: CPT | Performed by: FAMILY MEDICINE

## 2024-11-04 PROCEDURE — 1160F RVW MEDS BY RX/DR IN RCRD: CPT | Performed by: FAMILY MEDICINE

## 2024-11-04 PROCEDURE — 1158F ADVNC CARE PLAN TLK DOCD: CPT | Performed by: FAMILY MEDICINE

## 2024-11-04 PROCEDURE — 3078F DIAST BP <80 MM HG: CPT | Performed by: FAMILY MEDICINE

## 2024-11-04 PROCEDURE — 3074F SYST BP LT 130 MM HG: CPT | Performed by: FAMILY MEDICINE

## 2024-11-04 RX ORDER — AMOXICILLIN AND CLAVULANATE POTASSIUM 875; 125 MG/1; MG/1
1 TABLET, FILM COATED ORAL 2 TIMES DAILY
Qty: 10 TABLET | Refills: 0 | Status: SHIPPED | OUTPATIENT
Start: 2024-11-04 | End: 2024-11-09

## 2024-11-04 ASSESSMENT — ENCOUNTER SYMPTOMS
COUGH: 0
FEVER: 0
DIZZINESS: 0
ABDOMINAL PAIN: 0

## 2024-11-04 ASSESSMENT — ACTIVITIES OF DAILY LIVING (ADL)
DRESSING: INDEPENDENT
BATHING: INDEPENDENT

## 2024-11-04 NOTE — ASSESSMENT & PLAN NOTE
Orders:    CBC and Auto Differential; Future    Comprehensive Metabolic Panel; Future    Lipid Panel; Future    TSH with reflex to Free T4 if abnormal; Future    Hemoglobin A1C; Future    Vitamin D 25-Hydroxy,Total (for eval of Vitamin D levels); Future    Vitamin B12; Future

## 2024-11-04 NOTE — PROGRESS NOTES
Subjective   Patient ID: Hugo Weaver is a 89 y.o. male who presents for Leg Swelling (Left leg edema - redness /Pt also has foot edema) and Annual Exam (Pt has active advanced directives. ).    HPI     He is here today for follow-up on left leg cellulitis.  He is also due for his annual wellness visit and this was performed today    Left leg cellulitis: I saw him for a virtual visit on 10/18/2024.  At that time he had redness involving his left leg which had been present for approximately 5 days, and left leg swelling which had been present for 1 day.  Past medical history is significant for history of hospitalization in January of this year with left leg cellulitis.  During that stay he had an ultrasound of his left leg which was negative for DVT.  He was treated with Unasyn and discharged with Augmentin x 14 days  At his most recent visit, we treated him with Augmentin twice daily x 10 days  He completed the course of Augmentin last week.  He states that the swelling has been improving, and the leg has been less red.  I spoke with him on the phone on Thursday of last week and at that time he was having leg swelling which had been improving but was still present.  He states that since then, his leg swelling has continued to improve, but is not yet back to baseline.  No fever.    He is otherwise feeling well denies any other symptoms including fever or shortness of breath    He follows with cardiology for chronic atrial fibrillation, CAD with history of CABG, and chronic systolic heart failure.  He is currently taking enalapril, metoprolol, spironolactone, and Xarelto.  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  His most recent A1c checked in  December was 6.5%  He follows with optometry annually for eye exams  He has a history of chronic neuropathy in both legs  Takes pravastatin for hyperlipidemia.  Last LDL checked 12/2023 was 86  Vaccinations: Received Tdap vaccine in 2020.  Prevnar 20 vaccine in 2023.  He is up-to-date on the shingles vaccine  He has a history of vitamin D deficiency.  Currently takes vitamin D 1000 IU daily.  Last vitamin D level checked in December of last year was 30      Review of Systems   Constitutional:  Negative for fever.   Respiratory:  Negative for cough.    Cardiovascular:  Positive for leg swelling. Negative for chest pain.   Gastrointestinal:  Negative for abdominal pain.   Skin:  Positive for rash.   Neurological:  Negative for dizziness.       Objective   /68   Pulse 86   Temp 36.3 °C (97.3 °F) (Temporal)   Resp 20   Ht 1.829 m (6')   Wt 71.7 kg (158 lb)   SpO2 98%   BMI 21.43 kg/m²     Physical Exam  Vitals reviewed.   Constitutional:       General: He is not in acute distress.     Appearance: Normal appearance. He is well-developed.   HENT:      Head: Normocephalic.      Right Ear: Tympanic membrane, ear canal and external ear normal.      Left Ear: Tympanic membrane, ear canal and external ear normal.      Nose: Nose normal.      Mouth/Throat:      Mouth: Mucous membranes are moist.   Eyes:      Conjunctiva/sclera: Conjunctivae normal.   Neck:      Thyroid: No thyromegaly.      Vascular: No JVD.   Cardiovascular:      Rate and Rhythm: Normal rate and regular rhythm.      Heart sounds: Normal heart sounds.   Pulmonary:      Effort: Pulmonary effort is normal.      Breath sounds: Normal breath sounds.   Abdominal:      Palpations: Abdomen is soft.      Tenderness: There is no abdominal tenderness.   Musculoskeletal:         General: Normal range of motion.      Right lower leg: Edema present.      Left lower leg: Edema present.   Lymphadenopathy:      Cervical: No cervical adenopathy.   Skin:      Findings: Rash present.      Comments: Right leg: Trace to 1+ leg edema  Left leg: There is 1+ pitting leg edema with mild dull erythema involving the lower half of his left anterior shin.  There is no warmth   Neurological:      Mental Status: He is alert and oriented to person, place, and time.   Psychiatric:         Mood and Affect: Mood normal.         Behavior: Behavior normal.         Assessment/Plan   Assessment & Plan  Encounter for well adult exam with abnormal findings         Cellulitis of lower extremity, unspecified laterality    Orders:    CBC and Auto Differential; Future    Comprehensive Metabolic Panel; Future    Lipid Panel; Future    TSH with reflex to Free T4 if abnormal; Future    Hemoglobin A1C; Future    Vitamin D 25-Hydroxy,Total (for eval of Vitamin D levels); Future    Vitamin B12; Future    amoxicillin-pot clavulanate (Augmentin) 875-125 mg tablet; Take 1 tablet by mouth 2 times a day for 5 days.    Type 2 diabetes mellitus with chronic kidney disease, without long-term current use of insulin, unspecified CKD stage (Multi)    Orders:    CBC and Auto Differential; Future    Comprehensive Metabolic Panel; Future    Lipid Panel; Future    TSH with reflex to Free T4 if abnormal; Future    Hemoglobin A1C; Future    Vitamin D 25-Hydroxy,Total (for eval of Vitamin D levels); Future    Vitamin B12; Future    Vitamin D deficiency    Orders:    CBC and Auto Differential; Future    Comprehensive Metabolic Panel; Future    Lipid Panel; Future    TSH with reflex to Free T4 if abnormal; Future    Hemoglobin A1C; Future    Vitamin D 25-Hydroxy,Total (for eval of Vitamin D levels); Future    Vitamin B12; Future    Preventative health:  Up-to-date on tetanus, pneumococcal and shingles vaccines.  Given influenza vaccine today.  Up-to-date on RSV vaccine.  His last COVID vaccine was done in January and he is going to be due for this soon.  He had a COVID infection in September, so I recommended that he wait  another 1 to 2 months before getting the COVID vaccine  Left leg cellulitis: Leg swelling and redness have been slowly improving.  We discussed keeping the leg elevated, warm compresses, and continue bumetanide as needed.  There is still mild dull redness present so we will treat with an additional 5 days of Augmentin.  We did discuss possibly getting a left leg ultrasound.  Given that he is already taking Xarelto and the leg redness and swelling have been improving, I do not have a high suspicion for DVT.  After discussing this, he feels comfortable monitoring and continuing current care including diuretic and antibiotic.  I did recommend that he contact me in the next 1 to 2 weeks if symptoms do not continue to improve or resolve  Diabetes mellitus: Last A1c was 6.5%.  We will recheck this with next labs  If otherwise doing well, follow-up in 1 year for next AWV

## 2024-11-13 ENCOUNTER — APPOINTMENT (OUTPATIENT)
Dept: PRIMARY CARE | Facility: CLINIC | Age: 89
End: 2024-11-13
Payer: MEDICARE

## 2024-11-14 ENCOUNTER — TELEPHONE (OUTPATIENT)
Dept: PRIMARY CARE | Facility: CLINIC | Age: 89
End: 2024-11-14
Payer: MEDICARE

## 2024-11-14 NOTE — TELEPHONE ENCOUNTER
"Patient called wanting to give an update about his leg.  He states that his swelling and redness of his leg has decreased significantly (only slight redness/swelling now) but it's a lot better and feels that \"he's finally turning the corner\" with this.   "

## 2024-11-25 ENCOUNTER — TELEMEDICINE (OUTPATIENT)
Dept: PRIMARY CARE | Facility: CLINIC | Age: 89
End: 2024-11-25
Payer: MEDICARE

## 2024-11-25 ENCOUNTER — TELEPHONE (OUTPATIENT)
Dept: PRIMARY CARE | Facility: CLINIC | Age: 89
End: 2024-11-25
Payer: MEDICARE

## 2024-11-25 DIAGNOSIS — B00.1 HERPES LABIALIS: Primary | ICD-10-CM

## 2024-11-25 PROCEDURE — 1160F RVW MEDS BY RX/DR IN RCRD: CPT | Performed by: FAMILY MEDICINE

## 2024-11-25 PROCEDURE — 1036F TOBACCO NON-USER: CPT | Performed by: FAMILY MEDICINE

## 2024-11-25 PROCEDURE — 99442 PR PHYS/QHP TELEPHONE EVALUATION 11-20 MIN: CPT | Performed by: FAMILY MEDICINE

## 2024-11-25 PROCEDURE — 1159F MED LIST DOCD IN RCRD: CPT | Performed by: FAMILY MEDICINE

## 2024-11-25 RX ORDER — VALACYCLOVIR HYDROCHLORIDE 1 G/1
1000 TABLET, FILM COATED ORAL 2 TIMES DAILY PRN
Qty: 2 TABLET | Refills: 3 | Status: SHIPPED | OUTPATIENT
Start: 2024-11-25 | End: 2024-11-26

## 2024-11-25 ASSESSMENT — ENCOUNTER SYMPTOMS: FEVER: 0

## 2024-11-25 NOTE — TELEPHONE ENCOUNTER
Patient has a cold sore on his lip. He is just getting over a sickness.   He bought Abreva, and used it and now his lip is swollen really bad.       Patient would like to know how to proceed? Please advise, Thanks!

## 2024-11-25 NOTE — PROGRESS NOTES
Subjective   Patient ID: Hugo Weaver is a 89 y.o. male who presents for No chief complaint on file..    Rash  This is a new problem. The current episode started yesterday. Pertinent negatives include no fever.        Virtual or Telephone Consent    A telephone visit (audio only) between the patient (at the originating site) and the provider (at the distant site) was utilized to provide this telehealth service.   Verbal consent was requested and obtained from Hugo Weaver on this date, 11/25/24 for a telehealth visit.     He developed a cold sore on his left lower lip 2 days ago.  He treated with Abreva.  Yesterday the area became more swollen.  It is still swollen today (overall no change)  No fever.  The area seems uncomfortable  He does have a history of recurrent cold sores in the past.  These typically occur a few times per year.  He states that the cold sore he developed 2 days ago is similar to cold sores he has had in the past      Review of Systems   Constitutional:  Negative for fever.   Skin:  Positive for rash.       Objective   There were no vitals taken for this visit.    Physical Exam    Assessment/Plan   Assessment & Plan  Herpes labialis    Orders:    valACYclovir (Valtrex) 1 gram tablet; Take 1 tablet (1,000 mg) by mouth 2 times a day as needed (outbreak) for up to 1 day.    Today's visit was done over the telephone, so I was not able to directly visualize the area, however he does report that he has a history of cold sores in the past, and the cold sore he had developed 2 days ago was similar to cold sores he has had in the past.  We will start treatment with valacyclovir as needed and monitor very closely.  His last GFR was 44, so we will renally dose valacyclovir to 1000 mg every 12 hours x 1 day to treat his current outbreak.  I gave him refills to take as needed for any future outbreaks  There is an interaction between valacyclovir and bumetanide.  He takes this medication as needed for  edema, and has not taken in the past week.  I did recommend that he not take bumetanide over the next few days after taking valacyclovir  Recommended that he call or follow-up if this does not completely resolve in the next 5 to 7 days  Total time spent today was approximately 15 minutes

## 2024-12-18 DIAGNOSIS — L12.0 BULLOUS PEMPHIGOID (MULTI): ICD-10-CM

## 2024-12-19 ENCOUNTER — LAB (OUTPATIENT)
Dept: LAB | Facility: LAB | Age: 89
End: 2024-12-19
Payer: MEDICARE

## 2024-12-19 DIAGNOSIS — E55.9 VITAMIN D DEFICIENCY: ICD-10-CM

## 2024-12-19 DIAGNOSIS — N40.1 BENIGN PROSTATIC HYPERPLASIA WITH LOWER URINARY TRACT SYMPTOMS: Primary | ICD-10-CM

## 2024-12-19 DIAGNOSIS — L03.119 CELLULITIS OF LOWER EXTREMITY, UNSPECIFIED LATERALITY: ICD-10-CM

## 2024-12-19 DIAGNOSIS — E11.22 TYPE 2 DIABETES MELLITUS WITH CHRONIC KIDNEY DISEASE, WITHOUT LONG-TERM CURRENT USE OF INSULIN, UNSPECIFIED CKD STAGE (MULTI): ICD-10-CM

## 2024-12-19 LAB
25(OH)D3 SERPL-MCNC: 38 NG/ML (ref 30–100)
ALBUMIN SERPL BCP-MCNC: 4.2 G/DL (ref 3.4–5)
ALP SERPL-CCNC: 37 U/L (ref 33–136)
ALT SERPL W P-5'-P-CCNC: 7 U/L (ref 10–52)
ANION GAP SERPL CALC-SCNC: 17 MMOL/L (ref 10–20)
AST SERPL W P-5'-P-CCNC: 15 U/L (ref 9–39)
BASOPHILS # BLD AUTO: 0.07 X10*3/UL (ref 0–0.1)
BASOPHILS NFR BLD AUTO: 1.1 %
BILIRUB SERPL-MCNC: 0.8 MG/DL (ref 0–1.2)
BUN SERPL-MCNC: 35 MG/DL (ref 6–23)
CALCIUM SERPL-MCNC: 9.4 MG/DL (ref 8.6–10.6)
CHLORIDE SERPL-SCNC: 107 MMOL/L (ref 98–107)
CHOLEST SERPL-MCNC: 128 MG/DL (ref 0–199)
CHOLESTEROL/HDL RATIO: 2.5
CO2 SERPL-SCNC: 23 MMOL/L (ref 21–32)
CREAT SERPL-MCNC: 1.41 MG/DL (ref 0.5–1.3)
EGFRCR SERPLBLD CKD-EPI 2021: 46 ML/MIN/1.73M*2
EOSINOPHIL # BLD AUTO: 0.54 X10*3/UL (ref 0–0.4)
EOSINOPHIL NFR BLD AUTO: 8.8 %
ERYTHROCYTE [DISTWIDTH] IN BLOOD BY AUTOMATED COUNT: 14.5 % (ref 11.5–14.5)
EST. AVERAGE GLUCOSE BLD GHB EST-MCNC: 120 MG/DL
GLUCOSE SERPL-MCNC: 116 MG/DL (ref 74–99)
HBA1C MFR BLD: 5.8 %
HCT VFR BLD AUTO: 39.7 % (ref 41–52)
HDLC SERPL-MCNC: 50.7 MG/DL
HGB BLD-MCNC: 12.8 G/DL (ref 13.5–17.5)
IMM GRANULOCYTES # BLD AUTO: 0.12 X10*3/UL (ref 0–0.5)
IMM GRANULOCYTES NFR BLD AUTO: 2 % (ref 0–0.9)
LDLC SERPL CALC-MCNC: 61 MG/DL
LYMPHOCYTES # BLD AUTO: 0.83 X10*3/UL (ref 0.8–3)
LYMPHOCYTES NFR BLD AUTO: 13.6 %
MCH RBC QN AUTO: 32.2 PG (ref 26–34)
MCHC RBC AUTO-ENTMCNC: 32.2 G/DL (ref 32–36)
MCV RBC AUTO: 100 FL (ref 80–100)
MONOCYTES # BLD AUTO: 0.54 X10*3/UL (ref 0.05–0.8)
MONOCYTES NFR BLD AUTO: 8.8 %
NEUTROPHILS # BLD AUTO: 4.01 X10*3/UL (ref 1.6–5.5)
NEUTROPHILS NFR BLD AUTO: 65.7 %
NON HDL CHOLESTEROL: 77 MG/DL (ref 0–149)
NRBC BLD-RTO: 0 /100 WBCS (ref 0–0)
PLATELET # BLD AUTO: 187 X10*3/UL (ref 150–450)
POTASSIUM SERPL-SCNC: 4.5 MMOL/L (ref 3.5–5.3)
PROT SERPL-MCNC: 6.5 G/DL (ref 6.4–8.2)
PSA SERPL-MCNC: 1.62 NG/ML
RBC # BLD AUTO: 3.98 X10*6/UL (ref 4.5–5.9)
SODIUM SERPL-SCNC: 142 MMOL/L (ref 136–145)
TRIGL SERPL-MCNC: 81 MG/DL (ref 0–149)
TSH SERPL-ACNC: 3.93 MIU/L (ref 0.44–3.98)
VIT B12 SERPL-MCNC: 397 PG/ML (ref 211–911)
VLDL: 16 MG/DL (ref 0–40)
WBC # BLD AUTO: 6.1 X10*3/UL (ref 4.4–11.3)

## 2024-12-19 PROCEDURE — 80053 COMPREHEN METABOLIC PANEL: CPT

## 2024-12-19 PROCEDURE — 83036 HEMOGLOBIN GLYCOSYLATED A1C: CPT

## 2024-12-19 PROCEDURE — 85025 COMPLETE CBC W/AUTO DIFF WBC: CPT

## 2024-12-19 PROCEDURE — 82306 VITAMIN D 25 HYDROXY: CPT

## 2024-12-19 PROCEDURE — 36415 COLL VENOUS BLD VENIPUNCTURE: CPT

## 2024-12-19 PROCEDURE — 84153 ASSAY OF PSA TOTAL: CPT

## 2024-12-19 PROCEDURE — 84443 ASSAY THYROID STIM HORMONE: CPT

## 2024-12-19 PROCEDURE — 82607 VITAMIN B-12: CPT

## 2024-12-19 PROCEDURE — 80061 LIPID PANEL: CPT

## 2024-12-20 RX ORDER — AZATHIOPRINE 50 MG/1
TABLET ORAL
Qty: 135 TABLET | Refills: 3 | Status: SHIPPED | OUTPATIENT
Start: 2024-12-20

## 2024-12-20 RX ORDER — CLOBETASOL PROPIONATE 0.5 MG/G
CREAM TOPICAL 2 TIMES DAILY
Qty: 30 G | Refills: 3 | Status: SHIPPED | OUTPATIENT
Start: 2024-12-20 | End: 2025-01-19

## 2025-01-07 DIAGNOSIS — E78.5 DYSLIPIDEMIA: ICD-10-CM

## 2025-01-07 RX ORDER — PRAVASTATIN SODIUM 20 MG/1
20 TABLET ORAL NIGHTLY
Qty: 90 TABLET | Refills: 3 | Status: SHIPPED | OUTPATIENT
Start: 2025-01-07

## 2025-01-17 DIAGNOSIS — I50.22 CHRONIC SYSTOLIC HEART FAILURE: ICD-10-CM

## 2025-01-20 RX ORDER — SPIRONOLACTONE 25 MG/1
25 TABLET ORAL DAILY
Qty: 90 TABLET | Refills: 3 | Status: SHIPPED | OUTPATIENT
Start: 2025-01-20 | End: 2026-01-20

## 2025-01-27 ENCOUNTER — TELEPHONE (OUTPATIENT)
Dept: DERMATOLOGY | Facility: CLINIC | Age: OVER 89
End: 2025-01-27
Payer: MEDICARE

## 2025-01-27 NOTE — TELEPHONE ENCOUNTER
Called patient to schedule him to see Pearl Parra. Patient did not answer so message was left for patient to call us back.

## 2025-01-29 ENCOUNTER — APPOINTMENT (OUTPATIENT)
Dept: OTOLARYNGOLOGY | Facility: CLINIC | Age: OVER 89
End: 2025-01-29
Payer: MEDICARE

## 2025-01-29 DIAGNOSIS — H61.23 BILATERAL IMPACTED CERUMEN: Primary | ICD-10-CM

## 2025-01-29 PROCEDURE — 69210 REMOVE IMPACTED EAR WAX UNI: CPT | Performed by: OTOLARYNGOLOGY

## 2025-01-29 NOTE — PROGRESS NOTES
Patient returns today for evaluation and infection management.  No other worrisome ENT issues.  He will meet up with audiology afterwards and ensure appointment for surveillance of his hearing aids.  No change in past medical surgical history.      Exam/procedure:  Under the operating microscope bowel cerumen is removed right greater than left.  Following removal, ears are completely normal.  Patient tolerated well.    Assessment and plan: Bilateral cerumen faction explained.  Follow as needed.

## 2025-02-05 ENCOUNTER — HOSPITAL ENCOUNTER (OUTPATIENT)
Dept: CARDIOLOGY | Facility: HOSPITAL | Age: OVER 89
Discharge: HOME | End: 2025-02-05
Payer: MEDICARE

## 2025-02-05 DIAGNOSIS — I48.0 PAROXYSMAL ATRIAL FIBRILLATION (MULTI): ICD-10-CM

## 2025-02-05 DIAGNOSIS — Z95.0 CARDIAC PACEMAKER IN SITU: ICD-10-CM

## 2025-02-05 DIAGNOSIS — I42.0 PRIMARY IDIOPATHIC DILATED CARDIOMYOPATHY (MULTI): ICD-10-CM

## 2025-02-05 PROCEDURE — 93296 REM INTERROG EVL PM/IDS: CPT

## 2025-02-13 ENCOUNTER — APPOINTMENT (OUTPATIENT)
Dept: CARDIOLOGY | Facility: CLINIC | Age: OVER 89
End: 2025-02-13
Payer: MEDICARE

## 2025-02-24 NOTE — PROGRESS NOTES
Chief Complaint:   No chief complaint on file.     History Of Present Illness:    Hugo Weaver is a 89 y.o. male with a history of LBBB, CAD s/p CABG (2003 LIMA to LAD, VG to OM, and VG to right PDA), peripheral neuropathy, atrial fibrillation, chronic systolic heart failure, abdominal aortic aneurysm s/p repair, diabetes and chronic kidney disease here for follow-up.    Doing well. Leg swelling has improved.  He has a platform device that uses vibration that he puts his feet on 2 every morning.  This has helped with the leg swelling.  He also uses his compression stockings.    He denies any palpitations, chest pain or shortness of breath.  Does use a walker to ambulate.    Patient saw Dr. Gibbons on 10/1/2024.  I reviewed that note.    BiV pacemaker implant 4/15/24: [employer] device placed by Dr. Gibbons    Echocardiogram 11/2/2023: Mildly reduced LV function.  Mildly enlarged RV with mildly reduced RV function.  Severe left atrial enlargement and mild to moderate right atrial enlargement.  Moderate AR, mild MR, and mild to moderate TR.  RVSP 26 mmHg.     Echocardiogram 3/1/2023: EF 40 to 45%. Abnormal septal motion consistent with LBBB. Mildly reduced right ventricular systolic function. Severe left atrial enlargement and mild right atrial enlargement. Moderate AR.     Lexiscan nuclear stress test 12/6/2021: No evidence of ischemia. Possible scar in the distal anterior and distal septal wall extending to the apex. EF 53%.     24-hour Holter February 2021: Atrial fibrillation 100% of the time. Average heart rate 57 bpm ( bpm). Pause of 3 seconds at 4 AM on day 2.     Echocardiogram 6/12/2015: EF 30 to 35%. Mild to moderate left atrial enlargement and right atrial enlargement. Mild AR and TR.     Catheterization 12/31/2007: Mid circumflex 95-99% and right PDA 80%.  VG to PDA occluded.  VG to OM patent. LIMA was atretic and nonfunctional with note of anterograde flow through the native LAD system.         Allergies:  Atorvastatin and Simvastatin    Outpatient Medications:  Current Outpatient Medications   Medication Instructions    acetaminophen (TYLENOL) 650 mg, oral, Every 4 hours PRN    ascorbic acid (Vitamin C) 1,000 mg tablet 1 tablet, Daily    azaTHIOprine (Imuran) 50 mg tablet Take 2.5 tablets daily.    bumetanide (BUMEX) 2 mg, oral, Daily PRN    enalapril (VASOTEC) 5 mg, oral, Nightly    metoprolol succinate XL (TOPROL-XL) 50 mg, oral, Daily, Do not crush or chew.    ondansetron ODT (ZOFRAN-ODT) 4 mg, oral, Every 8 hours PRN    pravastatin (PRAVACHOL) 20 mg, oral, Nightly    rivaroxaban (XARELTO) 15 mg, oral, Daily with evening meal    spironolactone (ALDACTONE) 25 mg, oral, Daily    tamsulosin (FLOMAX) 0.4 mg, Nightly    vitamin E 400 Units, Daily       Last Recorded Vitals:  Visit Vitals  /70 (BP Location: Left arm, Patient Position: Sitting, BP Cuff Size: Adult)   Pulse 91   Ht 1.829 m (6')   Wt 75.3 kg (166 lb)   SpO2 98%   BMI 22.51 kg/m²   Smoking Status Former   BSA 1.96 m²        Physical Exam:  In general: alert and in no acute distress.   HEENT: Carotid upstrokes normal with no bruits. JVP is normal.   Pulmonary: Clear to auscultation bilaterally.  Cardiovascular: S1, S2, regular. No appreciable murmurs, rubs or gallops.   Lower extremities: Warm. 2+ distal pulses.  Trace bilateral lower extremity edema.     Last Labs:  CBC -  Lab Results   Component Value Date    WBC 6.1 12/19/2024    HGB 12.8 (L) 12/19/2024    HCT 39.7 (L) 12/19/2024     12/19/2024     12/19/2024       CMP -  Lab Results   Component Value Date    CALCIUM 9.4 12/19/2024    PHOS 3.2 03/08/2023    PROT 6.5 12/19/2024    ALBUMIN 4.2 12/19/2024    AST 15 12/19/2024    ALT 7 (L) 12/19/2024    ALKPHOS 37 12/19/2024    BILITOT 0.8 12/19/2024       LIPID PANEL -   Lab Results   Component Value Date    CHOL 128 12/19/2024    TRIG 81 12/19/2024    HDL 50.7 12/19/2024    CHHDL 2.5 12/19/2024    LDLF 82 01/04/2023     VLDL 16 12/19/2024    NHDL 77 12/19/2024       RENAL FUNCTION PANEL -   Lab Results   Component Value Date    GLUCOSE 116 (H) 12/19/2024     12/19/2024    K 4.5 12/19/2024     12/19/2024    CO2 23 12/19/2024    ANIONGAP 17 12/19/2024    BUN 35 (H) 12/19/2024    CREATININE 1.41 (H) 12/19/2024    GFRMALE 47 (A) 07/11/2023    CALCIUM 9.4 12/19/2024    PHOS 3.2 03/08/2023    ALBUMIN 4.2 12/19/2024        Lab Results   Component Value Date     (H) 01/22/2024    HGBA1C 5.8 (H) 12/19/2024           Assessment/Plan    1) CAD: History of CABG with LIMA to LAD, VG to OM and VG to PDA.  Catheterization 2007 demonstrating an atretic LIMA with antegrade flow through the native LAD.  VG to PDA was also occluded.  VG to OM was patent.     No signs or symptoms to suggest progression of underlying coronary disease.     2) atrial fibrillation: Continue Xarelto 15 mg daily for stroke reduction.     3) left bundle branch block: Now s/p CRT-P.       4) chronic systolic heart failure: Volume status has improved.  Is tolerant of his spironolactone, metoprolol and enalapril.  Kidney function has been stable.  For now we will continue to observe.  He is not interested in repeat echocardiography and I told him that I do not believe that repeat echocardiography would change our treatment strategy.    5) dyslipidemia: LDL 82  on most recent lipid panel.  Continue with pravastatin 20 mg daily.     6) follow-up: Will ask him to see me 6 months after he sees Dr. Gibbons.    Jef Jara MD

## 2025-02-25 ENCOUNTER — OFFICE VISIT (OUTPATIENT)
Dept: CARDIOLOGY | Facility: CLINIC | Age: OVER 89
End: 2025-02-25
Payer: MEDICARE

## 2025-02-25 VITALS
OXYGEN SATURATION: 98 % | DIASTOLIC BLOOD PRESSURE: 70 MMHG | HEART RATE: 91 BPM | HEIGHT: 72 IN | WEIGHT: 166 LBS | BODY MASS INDEX: 22.48 KG/M2 | SYSTOLIC BLOOD PRESSURE: 118 MMHG

## 2025-02-25 DIAGNOSIS — I48.21 PERMANENT ATRIAL FIBRILLATION (MULTI): ICD-10-CM

## 2025-02-25 DIAGNOSIS — E78.5 DYSLIPIDEMIA: ICD-10-CM

## 2025-02-25 DIAGNOSIS — I48.91 ATRIAL FIBRILLATION, UNSPECIFIED TYPE (MULTI): ICD-10-CM

## 2025-02-25 DIAGNOSIS — I50.22 CHRONIC SYSTOLIC HEART FAILURE: ICD-10-CM

## 2025-02-25 DIAGNOSIS — I44.7 LEFT BUNDLE-BRANCH BLOCK: Primary | ICD-10-CM

## 2025-02-25 DIAGNOSIS — I71.40 ABDOMINAL AORTIC ANEURYSM (AAA) WITHOUT RUPTURE, UNSPECIFIED PART (CMS-HCC): ICD-10-CM

## 2025-02-25 DIAGNOSIS — I25.709 ATHEROSCLEROSIS OF CORONARY ARTERY BYPASS GRAFT OF NATIVE HEART WITH ANGINA PECTORIS: ICD-10-CM

## 2025-02-25 DIAGNOSIS — I10 BENIGN ESSENTIAL HYPERTENSION: ICD-10-CM

## 2025-02-25 PROCEDURE — 1036F TOBACCO NON-USER: CPT | Performed by: INTERNAL MEDICINE

## 2025-02-25 PROCEDURE — 3074F SYST BP LT 130 MM HG: CPT | Performed by: INTERNAL MEDICINE

## 2025-02-25 PROCEDURE — 3078F DIAST BP <80 MM HG: CPT | Performed by: INTERNAL MEDICINE

## 2025-02-25 PROCEDURE — 1159F MED LIST DOCD IN RCRD: CPT | Performed by: INTERNAL MEDICINE

## 2025-02-25 PROCEDURE — 99214 OFFICE O/P EST MOD 30 MIN: CPT | Performed by: INTERNAL MEDICINE

## 2025-02-25 PROCEDURE — 1160F RVW MEDS BY RX/DR IN RCRD: CPT | Performed by: INTERNAL MEDICINE

## 2025-02-25 PROCEDURE — 1126F AMNT PAIN NOTED NONE PRSNT: CPT | Performed by: INTERNAL MEDICINE

## 2025-02-25 ASSESSMENT — PAIN SCALES - GENERAL: PAINLEVEL_OUTOF10: 0-NO PAIN

## 2025-02-26 NOTE — PROGRESS NOTES
Call regarding appt. with PCP on 11/13/2023 after hospitalization.  At time of outreach call the patient feels as if their condition has improved since last visit.  Reviewed the PCP appointment with the pt and addressed any questions or concerns.   
Negative

## 2025-03-28 ENCOUNTER — OFFICE VISIT (OUTPATIENT)
Dept: URGENT CARE | Age: OVER 89
End: 2025-03-28
Payer: MEDICARE

## 2025-03-28 VITALS
OXYGEN SATURATION: 99 % | BODY MASS INDEX: 22.48 KG/M2 | HEART RATE: 90 BPM | SYSTOLIC BLOOD PRESSURE: 112 MMHG | HEIGHT: 72 IN | WEIGHT: 166 LBS | DIASTOLIC BLOOD PRESSURE: 65 MMHG | TEMPERATURE: 97.3 F | RESPIRATION RATE: 16 BRPM

## 2025-03-28 DIAGNOSIS — S91.109A AVULSION OF SKIN OF TOE, INITIAL ENCOUNTER: Primary | ICD-10-CM

## 2025-03-28 RX ORDER — VALACYCLOVIR HYDROCHLORIDE 1 G/1
TABLET, FILM COATED ORAL
COMMUNITY
Start: 2025-02-05

## 2025-03-28 NOTE — PROGRESS NOTES
Subjective   Patient ID: Hugo Weaver is a 89 y.o. male. They present today with a chief complaint of Toe Problem (He cut his skin while cutting his toenail. LT big toe).    History of Present Illness  Pt was clipping his toenails and nipped the out edge of the tip of his left great toe. He is on blood thinners. Family brought him in because it would not stop bleeding. No other associated symptoms or concerns to address. No other injury noted.           Past Medical History  Allergies as of 03/28/2025 - Reviewed 03/28/2025   Allergen Reaction Noted    Atorvastatin Unknown 11/27/2010    Simvastatin Unknown 11/27/2010       (Not in a hospital admission)       Past Medical History:   Diagnosis Date    Atrial fibrillation (Multi)     Cataract     Chronic kidney disease     Coronary artery disease     Diabetes mellitus (Multi)     Dyspnea, unspecified     Dyspnea    Heart disease     HL (hearing loss)     Hyperlipidemia     Hypertension     Ischemic cardiomyopathy     Other specified postprocedural states     History of endoscopy    Personal history of other diseases of the circulatory system     History of aortic valve insufficiency    Personal history of other diseases of the circulatory system     History of mitral valve insufficiency    Personal history of other diseases of the circulatory system     Personal history of coronary atherosclerosis    Personal history of other diseases of the digestive system 09/27/2018    History of biliary colic    Personal history of other endocrine, nutritional and metabolic disease     History of hyperlipidemia    Personal history of other endocrine, nutritional and metabolic disease     History of obesity    Personal history of other endocrine, nutritional and metabolic disease     History of type 1 diabetes mellitus    Personal history of other medical treatment     History of echocardiogram    Personal history of other specified conditions 12/30/2015    History of edema     Valvular heart disease        Past Surgical History:   Procedure Laterality Date    CARDIAC ELECTROPHYSIOLOGY PROCEDURE N/A 4/16/2024    Procedure: PPM BIV Implant;  Surgeon: Andrez Gibbons MD;  Location: [facility];  Service: Electrophysiology;  Laterality: N/A;    CHOLECYSTECTOMY  10/2018    CORONARY ARTERY BYPASS GRAFT  10/2005    CT ABDOMEN PELVIS ANGIOGRAM W AND/OR WO IV CONTRAST  03/18/2020    CT ABDOMEN PELVIS ANGIOGRAM W AND/OR WO IV CONTRAST 3/18/2020 [facility]    OTHER SURGICAL HISTORY  11/17/2014    Cardiac Catheter His Ablation    OTHER SURGICAL HISTORY  11/05/2018    Cholecystectomy laparoscopic    OTHER SURGICAL HISTORY  12/03/2019    Coronary artery bypass graft    OTHER SURGICAL HISTORY  07/25/2019    Gallbladder surgery        reports that he has quit smoking. His smoking use included cigarettes. He has never used smokeless tobacco. He reports that he does not currently use alcohol. He reports that he does not use drugs.    Review of Systems  Review of Systems  10 point ROS completed and all are negative other than what is stated in the current HPI                               Objective    Vitals:    03/28/25 1437   BP: 112/65   Pulse: 90   Resp: 16   Temp: 36.3 °C (97.3 °F)   SpO2: 99%   Weight: 75.3 kg (166 lb)   Height: 1.829 m (6')     No LMP for male patient.    Physical Exam  Vitals and nursing note reviewed.   Constitutional:       Appearance: Normal appearance.   HENT:      Head: Normocephalic and atraumatic.   Cardiovascular:      Rate and Rhythm: Normal rate and regular rhythm.   Pulmonary:      Effort: Pulmonary effort is normal.      Breath sounds: Normal breath sounds.   Musculoskeletal:         General: Signs of injury present.      Right lower leg: No edema.      Left lower leg: No edema.      Comments: 0.5 cm skin avulsion to the tip of the left great tow; bleeding now controlled   Skin:     General: Skin is warm and dry.      Capillary Refill: Capillary refill  takes less than 2 seconds.   Neurological:      Mental Status: He is alert and oriented to person, place, and time.   Psychiatric:         Behavior: Behavior normal.         Laceration Repair    Date/Time: 3/28/2025 2:52 PM    Performed by: DANIELLE Knox  Authorized by: DANIELLE Knox    Consent:     Consent obtained:  Verbal    Consent given by:  Patient    Risks, benefits, and alternatives were discussed: yes      Risks discussed:  Infection, pain, poor wound healing, vascular damage, nerve damage and poor cosmetic result    Alternatives discussed:  No treatment  Universal protocol:     Patient identity confirmed:  Verbally with patient  Anesthesia:     Anesthesia method:  None  Laceration details:     Location:  Toe    Toe location:  L big toe    Length (cm):  0.5  Treatment:     Area cleansed with:  Saline and chlorhexidine    Amount of cleaning:  Standard    Debridement:  None  Skin repair:     Repair method: avulsion, unable to close; Surgifoam applied.  Post-procedure details:     Dressing:  Adhesive bandage and bulky dressing    Procedure completion:  Tolerated      Point of Care Test & Imaging Results from this visit  No results found for this visit on 03/28/25.   Imaging  No results found.    Cardiology, Vascular, and Other Imaging  No other imaging results found for the past 2 days      Diagnostic study results (if any) were reviewed by DANIELLE Knox.    Assessment/Plan   Allergies, medications, history, and pertinent labs/EKGs/Imaging reviewed by DANIELLE Knox.     Medical Decision Making  Skin avulsion to the tip of the left great toe  - Wound cleaned  - Follow-up on Monday for wound check  - Advised patient and family and s/s to seek emergent care for  - TD updated  - Do not get bandage wet at this time  - Surgifoam applied and dressing  - Keep foot elevated today    Orders and Diagnoses  Diagnoses and all orders for this visit:  Avulsion  of skin of toe, initial encounter  Other orders  -     Td vaccine, age 7 years and older (TENIVAC)      Medical Admin Record      Patient disposition: Home    Electronically signed by DANIELLE Knox  2:50 PM

## 2025-03-28 NOTE — PATIENT INSTRUCTIONS
Skin avulsion to the tip of the left great toe  - Wound cleaned  - Follow-up on Monday for wound check  - Advised patient and family and s/s to seek emergent care for  - TD updated  - Do not get bandage wet at this time  - Surgifoam applied and dressing  - Keep foot elevated today

## 2025-03-29 ENCOUNTER — TELEPHONE (OUTPATIENT)
Dept: PRIMARY CARE | Facility: CLINIC | Age: OVER 89
End: 2025-03-29
Payer: MEDICARE

## 2025-03-29 DIAGNOSIS — L03.119 CELLULITIS OF LOWER EXTREMITY, UNSPECIFIED LATERALITY: Primary | ICD-10-CM

## 2025-03-29 RX ORDER — AMOXICILLIN AND CLAVULANATE POTASSIUM 875; 125 MG/1; MG/1
875 TABLET, FILM COATED ORAL 2 TIMES DAILY
Qty: 20 TABLET | Refills: 0 | Status: SHIPPED | OUTPATIENT
Start: 2025-03-29 | End: 2025-04-08

## 2025-03-31 ENCOUNTER — TELEPHONE (OUTPATIENT)
Dept: PRIMARY CARE | Facility: CLINIC | Age: OVER 89
End: 2025-03-31
Payer: MEDICARE

## 2025-03-31 ENCOUNTER — OFFICE VISIT (OUTPATIENT)
Dept: URGENT CARE | Age: OVER 89
End: 2025-03-31
Payer: MEDICARE

## 2025-03-31 VITALS
TEMPERATURE: 97.8 F | OXYGEN SATURATION: 97 % | RESPIRATION RATE: 16 BRPM | DIASTOLIC BLOOD PRESSURE: 75 MMHG | SYSTOLIC BLOOD PRESSURE: 123 MMHG | HEART RATE: 61 BPM

## 2025-03-31 DIAGNOSIS — S90.412D ABRASION OF LEFT GREAT TOE, SUBSEQUENT ENCOUNTER: Primary | ICD-10-CM

## 2025-03-31 PROCEDURE — 1159F MED LIST DOCD IN RCRD: CPT | Performed by: NURSE PRACTITIONER

## 2025-03-31 PROCEDURE — 99213 OFFICE O/P EST LOW 20 MIN: CPT | Performed by: NURSE PRACTITIONER

## 2025-03-31 PROCEDURE — 3074F SYST BP LT 130 MM HG: CPT | Performed by: NURSE PRACTITIONER

## 2025-03-31 PROCEDURE — 3078F DIAST BP <80 MM HG: CPT | Performed by: NURSE PRACTITIONER

## 2025-03-31 NOTE — PROGRESS NOTES
Subjective   Patient ID: Hugo Weaver is a 89 y.o. male. They present today with a chief complaint of Other (Follow up from bandaid. Cellulitis on left elg. On antibioitic. Keft leg. ).    History of Present Illness  Patient presents for a wound check. He was here on Friday for an avulsion on the tip of his toe that would not stop bleeding. Gel foam was applied and he was told to leave the bandage in place until today. Was removed and bleeding is controlled. We will soak his toe today and apply a new bandage which he will remove in 24 hours.     Past Medical History  Allergies as of 03/31/2025 - Reviewed 03/31/2025   Allergen Reaction Noted    Atorvastatin Unknown 11/27/2010    Simvastatin Unknown 11/27/2010       (Not in a hospital admission)       Past Medical History:   Diagnosis Date    Atrial fibrillation (Multi)     Cataract     Chronic kidney disease     Coronary artery disease     Diabetes mellitus (Multi)     Dyspnea, unspecified     Dyspnea    Heart disease     HL (hearing loss)     Hyperlipidemia     Hypertension     Ischemic cardiomyopathy     Other specified postprocedural states     History of endoscopy    Personal history of other diseases of the circulatory system     History of aortic valve insufficiency    Personal history of other diseases of the circulatory system     History of mitral valve insufficiency    Personal history of other diseases of the circulatory system     Personal history of coronary atherosclerosis    Personal history of other diseases of the digestive system 09/27/2018    History of biliary colic    Personal history of other endocrine, nutritional and metabolic disease     History of hyperlipidemia    Personal history of other endocrine, nutritional and metabolic disease     History of obesity    Personal history of other endocrine, nutritional and metabolic disease     History of type 1 diabetes mellitus    Personal history of other medical treatment     History of  echocardiogram    Personal history of other specified conditions 12/30/2015    History of edema    Valvular heart disease        Past Surgical History:   Procedure Laterality Date    CARDIAC ELECTROPHYSIOLOGY PROCEDURE N/A 4/16/2024    Procedure: PPM BIV Implant;  Surgeon: Andrez Gibbons MD;  Location: [facility];  Service: Electrophysiology;  Laterality: N/A;    CHOLECYSTECTOMY  10/2018    CORONARY ARTERY BYPASS GRAFT  10/2005    CT ABDOMEN PELVIS ANGIOGRAM W AND/OR WO IV CONTRAST  03/18/2020    CT ABDOMEN PELVIS ANGIOGRAM W AND/OR WO IV CONTRAST 3/18/2020 [facility]    OTHER SURGICAL HISTORY  11/17/2014    Cardiac Catheter His Ablation    OTHER SURGICAL HISTORY  11/05/2018    Cholecystectomy laparoscopic    OTHER SURGICAL HISTORY  12/03/2019    Coronary artery bypass graft    OTHER SURGICAL HISTORY  07/25/2019    Gallbladder surgery        reports that he has quit smoking. His smoking use included cigarettes. He has never used smokeless tobacco. He reports that he does not currently use alcohol. He reports that he does not use drugs.    Review of Systems  Review of Systems     See HPI                          Objective    Vitals:    03/31/25 1520   BP: 123/75   Pulse: 61   Resp: 16   Temp: 36.6 °C (97.8 °F)   SpO2: 97%     No LMP for male patient.    Physical Exam  Constitutional:       Appearance: Normal appearance.   HENT:      Head: Normocephalic and atraumatic.   Cardiovascular:      Rate and Rhythm: Normal rate and regular rhythm.      Pulses: Normal pulses.      Heart sounds: Normal heart sounds.   Pulmonary:      Effort: Pulmonary effort is normal.      Breath sounds: Normal breath sounds.     Bandage and Gel Foam removed.  Bleeding controlled.  Soaked in water and Hibiclens.  Bandage reapplied and patient will remove in 24 hours and do regular wound care.      Procedures    Point of Care Test & Imaging Results from this visit  No results found for this visit on 03/31/25.   Imaging  No  results found.    Cardiology, Vascular, and Other Imaging  No other imaging results found for the past 2 days      Diagnostic study results (if any) were reviewed by DANIELLE Mckeon.    Assessment/Plan   Allergies, medications, history, and pertinent labs/EKGs/Imaging reviewed by DANIELLE Mckeon.     Medical Decision Making  At time of discharge patient was clinically well-appearing and HDS for outpatient management. The patient and/or family was educated regarding diagnosis, supportive care, OTC and Rx medications. The patient and/or family was given the opportunity to ask questions prior to discharge.  They verbalized understanding of my discussion of the plans for treatment, expected course, indications to return to  or seek further evaluation in ED, and the need for timely follow up as directed.   They were provided with a work/school excuse if requested.      Orders and Diagnoses  There are no diagnoses linked to this encounter.    Medical Admin Record      Patient disposition: Home    Electronically signed by DANIELLE Mckeon  3:37 PM

## 2025-03-31 NOTE — TELEPHONE ENCOUNTER
I received a message from the on-call physician this weekend that they had called in Augmentin for possible cellulitis.  Please call and check on how he is doing and if he needs a follow-up this week to recheck the area

## 2025-04-02 ENCOUNTER — TELEPHONE (OUTPATIENT)
Dept: PRIMARY CARE | Facility: CLINIC | Age: OVER 89
End: 2025-04-02
Payer: MEDICARE

## 2025-04-02 DIAGNOSIS — Z76.89 ENCOUNTER FOR NAIL CARE: Primary | ICD-10-CM

## 2025-04-02 NOTE — TELEPHONE ENCOUNTER
Pt called in asking for a referral to a podiatrist, his podiatrist retired and he is having a hard time cutting his nails on his own.     Pt would like a call back

## 2025-04-07 ENCOUNTER — OFFICE VISIT (OUTPATIENT)
Dept: PRIMARY CARE | Facility: CLINIC | Age: OVER 89
End: 2025-04-07
Payer: MEDICARE

## 2025-04-07 VITALS
SYSTOLIC BLOOD PRESSURE: 106 MMHG | OXYGEN SATURATION: 97 % | HEART RATE: 100 BPM | TEMPERATURE: 97.6 F | DIASTOLIC BLOOD PRESSURE: 64 MMHG

## 2025-04-07 DIAGNOSIS — Z86.79 HISTORY OF ABDOMINAL AORTIC ANEURYSM (AAA): Primary | ICD-10-CM

## 2025-04-07 DIAGNOSIS — E11.42 TYPE 2 DIABETES MELLITUS WITH DIABETIC POLYNEUROPATHY, WITHOUT LONG-TERM CURRENT USE OF INSULIN: ICD-10-CM

## 2025-04-07 DIAGNOSIS — S90.415D ABRASION OF TOE OF LEFT FOOT, SUBSEQUENT ENCOUNTER: ICD-10-CM

## 2025-04-07 DIAGNOSIS — N18.32 STAGE 3B CHRONIC KIDNEY DISEASE (MULTI): ICD-10-CM

## 2025-04-07 PROCEDURE — 1036F TOBACCO NON-USER: CPT | Performed by: FAMILY MEDICINE

## 2025-04-07 PROCEDURE — G2211 COMPLEX E/M VISIT ADD ON: HCPCS | Performed by: FAMILY MEDICINE

## 2025-04-07 PROCEDURE — 1159F MED LIST DOCD IN RCRD: CPT | Performed by: FAMILY MEDICINE

## 2025-04-07 PROCEDURE — 99213 OFFICE O/P EST LOW 20 MIN: CPT | Performed by: FAMILY MEDICINE

## 2025-04-07 PROCEDURE — 1160F RVW MEDS BY RX/DR IN RCRD: CPT | Performed by: FAMILY MEDICINE

## 2025-04-07 PROCEDURE — 3074F SYST BP LT 130 MM HG: CPT | Performed by: FAMILY MEDICINE

## 2025-04-07 PROCEDURE — 3078F DIAST BP <80 MM HG: CPT | Performed by: FAMILY MEDICINE

## 2025-04-07 ASSESSMENT — ENCOUNTER SYMPTOMS
COUGH: 0
FEVER: 0

## 2025-04-07 NOTE — PROGRESS NOTES
Subjective   Patient ID: Hugo Weaver is a 89 y.o. male who presents for Follow-up (Foot and leg circulation concerns. /Attempted to clip nails due to podiatrist retiring, and took a chunk of skin with it. Went to urgent care was advised to follow up with PCP due to leg concerns. ).    HPI       He is here today to follow-up on left first toe abrasion  Approximately 8 to 9 days ago, he was cutting his left first toe.  He cut too far and accidentally cut the skin of his toe.  He has a history of chronic atrial fibrillation and is currently taking Xarelto  He went to an urgent care and was given a tetanus vaccine and had the area bandaged.  He was concerned, because he had developed redness involving his left lower leg, and has a history of leg cellulitis in the past, and they called in a prescription for Augmentin  The left leg redness has been improving.  No fever  He does have a history of a AAA repair in approximately 2016.  He had been seeing vascular surgery and they were following him with annual ultrasounds, however he has not had an ultrasound in the past year.  No abdominal pain  He does have chronic bilateral leg swelling, left greater than right.  He is on bumetanide as needed      Patient Health Questionnaire-2 Score: 0 (4/7/2025  3:45 PM)      Review of Systems   Constitutional:  Negative for fever.   Respiratory:  Negative for cough.    Cardiovascular:  Positive for leg swelling.   Skin:  Positive for rash.       Objective   /64   Pulse 100   Temp 36.4 °C (97.6 °F) (Temporal)   SpO2 97%     Physical Exam  Vitals reviewed.   Constitutional:       General: He is not in acute distress.     Appearance: Normal appearance. He is well-developed.   HENT:      Head: Normocephalic.   Eyes:      Conjunctiva/sclera: Conjunctivae normal.   Cardiovascular:      Rate and Rhythm: Normal rate and regular rhythm.      Heart sounds: Normal heart sounds.   Pulmonary:      Effort: Pulmonary effort is normal.       Breath sounds: Normal breath sounds.   Musculoskeletal:      Right lower leg: Edema present.      Left lower leg: Edema present.      Comments: Mild bilateral leg edema, left greater than right   Skin:     Findings: Rash present.      Comments: There are 2 crusted abrasions involving the distal aspect of the left first toe near toenail.  There is no active bleeding.  Left foot is warm to touch and pulses are easily palpable.  There is very mild, dusky red dull erythema involving the lower half of his left shin.  No warmth.  No calf tenderness   Neurological:      Mental Status: He is alert.      Sensory: Sensory deficit (Left foot decreased sensation to light touch.  This is chronic due to neuropathy) present.   Psychiatric:         Mood and Affect: Mood normal.         Behavior: Behavior normal.         Assessment/Plan   Assessment & Plan  History of abdominal aortic aneurysm (AAA)    Orders:    Vascular US Abdominal Aorta Aneurysm AAA Screening; Future    Type 2 diabetes mellitus with diabetic polyneuropathy, without long-term current use of insulin  Last A1c checked in December was at goal at 5.8%.  Continue to monitor this         Stage 3b chronic kidney disease (Multi)  This is stable and his last GFR was 46 when checked in December.  Continue to monitor 1-2 times per year         Abrasion of toe of left foot, subsequent encounter         The abrasion on his left distal toe appears to be healing appropriately and I do not see any signs of active infection.  Continue to keep covered and he is going to be establishing with a podiatrist for nail care  The redness on his left lower shin is most likely either due to resolving cellulitis or possible stasis dermatitis.  He is going to complete the course of Augmentin and follow-up if symptoms do not completely resolve  I do not suspect a DVT given that he is already taking Xarelto  We did discuss his history of AAA which was repaired in 2016.  He had been following  with a vascular surgeon with annual ultrasounds, but has not had an ultrasound in the past year.  I will order this today

## 2025-04-07 NOTE — ASSESSMENT & PLAN NOTE
This is stable and his last GFR was 46 when checked in December.  Continue to monitor 1-2 times per year

## 2025-04-23 ENCOUNTER — HOSPITAL ENCOUNTER (OUTPATIENT)
Dept: RADIOLOGY | Facility: CLINIC | Age: OVER 89
Discharge: HOME | End: 2025-04-23
Payer: MEDICARE

## 2025-04-23 DIAGNOSIS — Z86.79 HISTORY OF ABDOMINAL AORTIC ANEURYSM (AAA): ICD-10-CM

## 2025-04-23 PROCEDURE — 76706 US ABDL AORTA SCREEN AAA: CPT

## 2025-04-28 ENCOUNTER — TELEPHONE (OUTPATIENT)
Dept: PRIMARY CARE | Facility: CLINIC | Age: OVER 89
End: 2025-04-28
Payer: MEDICARE

## 2025-04-28 NOTE — TELEPHONE ENCOUNTER
I discussed his ultrasound results with him over the phone.  He has a history of AAA repair in 2016.  The ultrasound does not show any significant interval change in size of AAA since 2023.  This also appears to be stable compared to his prior CTA in 2020.  He was following with vascular surgery, Dr. Bradley and most recently saw him 2 years ago, and he had recommended annual monitoring.  I will continue to follow this with annual ultrasound

## 2025-05-08 ENCOUNTER — HOSPITAL ENCOUNTER (OUTPATIENT)
Dept: CARDIOLOGY | Facility: HOSPITAL | Age: OVER 89
Discharge: HOME | End: 2025-05-08
Payer: MEDICARE

## 2025-05-08 DIAGNOSIS — Z95.810 PRESENCE OF AUTOMATIC CARDIOVERTER/DEFIBRILLATOR (AICD): ICD-10-CM

## 2025-05-08 DIAGNOSIS — I42.9 CARDIOMYOPATHY, UNSPECIFIED TYPE (MULTI): ICD-10-CM

## 2025-05-08 PROCEDURE — 93296 REM INTERROG EVL PM/IDS: CPT

## 2025-05-08 PROCEDURE — 93294 REM INTERROG EVL PM/LDLS PM: CPT | Performed by: INTERNAL MEDICINE

## 2025-05-29 ENCOUNTER — TELEPHONE (OUTPATIENT)
Dept: PRIMARY CARE | Facility: CLINIC | Age: OVER 89
End: 2025-05-29
Payer: MEDICARE

## 2025-05-29 DIAGNOSIS — M17.9 OSTEOARTHRITIS OF KNEE, UNSPECIFIED LATERALITY, UNSPECIFIED OSTEOARTHRITIS TYPE: Primary | ICD-10-CM

## 2025-07-19 DIAGNOSIS — I10 HYPERTENSION, UNSPECIFIED TYPE: ICD-10-CM

## 2025-07-19 DIAGNOSIS — I48.91 ATRIAL FIBRILLATION, UNSPECIFIED TYPE (MULTI): ICD-10-CM

## 2025-07-21 RX ORDER — ENALAPRIL MALEATE 5 MG/1
5 TABLET ORAL NIGHTLY
Qty: 90 TABLET | Refills: 3 | Status: SHIPPED | OUTPATIENT
Start: 2025-07-21 | End: 2026-07-21

## 2025-07-21 RX ORDER — RIVAROXABAN 15 MG/1
TABLET, FILM COATED ORAL
Qty: 90 TABLET | Refills: 3 | Status: SHIPPED | OUTPATIENT
Start: 2025-07-21

## 2025-07-24 DIAGNOSIS — I49.3 PVC (PREMATURE VENTRICULAR CONTRACTION): ICD-10-CM

## 2025-07-24 RX ORDER — METOPROLOL SUCCINATE 50 MG/1
50 TABLET, EXTENDED RELEASE ORAL DAILY
Qty: 90 TABLET | Refills: 3 | Status: SHIPPED | OUTPATIENT
Start: 2025-07-24 | End: 2026-07-24

## 2025-07-24 NOTE — TELEPHONE ENCOUNTER
Received request for prescription refill for patient.  Patient follows with Andrez Gbibons MD      Request is for metoprolol   Is patient currently on medication- yes    Last OV- 10/1/24  Next OV- 10/15/25    Pended for signing and sent to provider.

## 2025-07-28 ENCOUNTER — APPOINTMENT (OUTPATIENT)
Dept: OTOLARYNGOLOGY | Facility: CLINIC | Age: OVER 89
End: 2025-07-28
Payer: MEDICARE

## 2025-07-28 DIAGNOSIS — H61.23 BILATERAL IMPACTED CERUMEN: Primary | ICD-10-CM

## 2025-07-28 PROCEDURE — 1159F MED LIST DOCD IN RCRD: CPT | Performed by: OTOLARYNGOLOGY

## 2025-07-28 PROCEDURE — 69210 REMOVE IMPACTED EAR WAX UNI: CPT | Performed by: OTOLARYNGOLOGY

## 2025-07-28 PROCEDURE — 1160F RVW MEDS BY RX/DR IN RCRD: CPT | Performed by: OTOLARYNGOLOGY

## 2025-07-28 NOTE — PROGRESS NOTES
Subjective   Patient ID: Hugo Weaver is a 89 y.o. male who presents for Cerumen Impaction.    HPI  The patient returns, being seen for evaluation of ears. History of cerumen impaction, here today for removal of same. He denies otalgia, otorrhea, tinnitus, decreased hearing, dizziness, vertigo. Does feel that he is more congested recently, for which he uses Flonase and take Claritin daily. All remaining head neck inquiry otherwise negative.      Physical Exam & Procedure:  Bilateral impacted cerumen removed under otomicroscopic examination using suction, curette, and forceps. Patient tolerated procedure without difficulty. Following removal, TMs are intact with no sign of infection, effusion, retraction, or perforation.     Assessment/Plan   Patient presents for evaluation of cerumen impaction. The ears were cleaned using otomicroscope and instrumentation.  Patient tolerated the procedure well. For nasal congestion, would recommend continuing nasal steroid spray and trying Zyrtec instead of Claritin. All questions were answered to patient's satisfaction. Patient to follow-up as needed.

## 2025-07-29 ENCOUNTER — TELEPHONE (OUTPATIENT)
Dept: PRIMARY CARE | Facility: CLINIC | Age: OVER 89
End: 2025-07-29
Payer: MEDICARE

## 2025-07-29 NOTE — TELEPHONE ENCOUNTER
Pt wanted to know if it is ok if he takes over the counter Zyrtec, he switched over from Claritin and wanted to make Dr. Barton aware , and also make sure it is safe.

## 2025-08-14 ENCOUNTER — HOSPITAL ENCOUNTER (OUTPATIENT)
Dept: CARDIOLOGY | Facility: HOSPITAL | Age: OVER 89
Discharge: HOME | End: 2025-08-14
Payer: MEDICARE

## 2025-08-14 DIAGNOSIS — I42.9 CARDIOMYOPATHY, UNSPECIFIED TYPE (MULTI): ICD-10-CM

## 2025-08-14 DIAGNOSIS — Z95.810 PRESENCE OF AUTOMATIC CARDIOVERTER/DEFIBRILLATOR (AICD): ICD-10-CM

## 2025-08-14 PROCEDURE — 93296 REM INTERROG EVL PM/IDS: CPT

## 2025-08-19 ENCOUNTER — TELEPHONE (OUTPATIENT)
Dept: CARDIOLOGY | Facility: CLINIC | Age: OVER 89
End: 2025-08-19
Payer: MEDICARE

## 2025-08-19 DIAGNOSIS — I42.0 PRIMARY IDIOPATHIC DILATED CARDIOMYOPATHY (MULTI): ICD-10-CM

## 2025-08-19 DIAGNOSIS — Z95.0 CARDIAC PACEMAKER IN SITU: ICD-10-CM

## 2025-10-01 ENCOUNTER — APPOINTMENT (OUTPATIENT)
Dept: CARDIOLOGY | Facility: HOSPITAL | Age: OVER 89
End: 2025-10-01
Payer: MEDICARE

## 2025-10-15 ENCOUNTER — APPOINTMENT (OUTPATIENT)
Dept: CARDIOLOGY | Facility: HOSPITAL | Age: OVER 89
End: 2025-10-15
Payer: MEDICARE

## 2025-10-15 ENCOUNTER — APPOINTMENT (OUTPATIENT)
Dept: CARDIOLOGY | Facility: CLINIC | Age: OVER 89
End: 2025-10-15
Payer: MEDICARE

## 2025-11-04 ENCOUNTER — APPOINTMENT (OUTPATIENT)
Dept: PRIMARY CARE | Facility: CLINIC | Age: OVER 89
End: 2025-11-04
Payer: MEDICARE

## 2025-11-11 ENCOUNTER — APPOINTMENT (OUTPATIENT)
Dept: CARDIOLOGY | Facility: CLINIC | Age: OVER 89
End: 2025-11-11
Payer: MEDICARE

## 2026-01-28 ENCOUNTER — APPOINTMENT (OUTPATIENT)
Dept: OTOLARYNGOLOGY | Facility: CLINIC | Age: OVER 89
End: 2026-01-28
Payer: MEDICARE

## (undated) DEVICE — INTRODUCER SYSTEM, PRELUDE SNAP, SPLITTABLE, HEMOSTATIC, 7FR

## (undated) DEVICE — ENVELOPE, ANTIBACTERIAL, AIGIS RX TYRX, ABSORBABLE, MED

## (undated) DEVICE — CATHETER, ACUMEN C315, FIXED, HIS SHAPE

## (undated) DEVICE — GUIDEWIRE, HI-TORQUE WHISPER MS, .014IN/3CM STRAIGHT TIP/190CM

## (undated) DEVICE — DRESSING, MEPILEX BORDER, POST-OP AG, 4 X 6 IN

## (undated) DEVICE — ACCESS KIT, MINI MAK, 5FR X 10CM, 0.018 X 40CM, SS/SS, STANDARD NEEDLE

## (undated) DEVICE — PLUG, PIN, CONNECTOR PORT

## (undated) DEVICE — CATHETER, WEDGE PRESSURE, BALLOON, DOUBLE LUMEN, 6 FR, 110 CM

## (undated) DEVICE — SHIELD, SCOOP FENESTRATION, SCATPAD, ABSORBENT, DUAL, LEFT SUB/ C

## (undated) DEVICE — CATHETER, DIAGNOSTIC, AMPLATZ, 5 FR, AL 2

## (undated) DEVICE — GUIDEWIRE, STRAIGHT TIP,  .035 DIA, 180 CM, 3 CM TIP"

## (undated) DEVICE — CATHETER, ATTAIN COMMAND, 50CM, EXTENDED HOOK, 3MM OD

## (undated) DEVICE — CATHETER, RESPONSE QUAD, 6FR, 5-5-5, CSL

## (undated) DEVICE — INTRODUCER SYSTEM, PRELUDE SNAP, SPLITTABLE, 9 FR X 13 CM